# Patient Record
Sex: MALE | Race: WHITE | NOT HISPANIC OR LATINO | URBAN - METROPOLITAN AREA
[De-identification: names, ages, dates, MRNs, and addresses within clinical notes are randomized per-mention and may not be internally consistent; named-entity substitution may affect disease eponyms.]

---

## 2018-07-27 RX ORDER — GLIPIZIDE 5 MG/1
5 TABLET ORAL EVERY MORNING
COMMUNITY

## 2018-07-27 RX ORDER — ASPIRIN 81 MG/1
81 TABLET ORAL DAILY
COMMUNITY

## 2018-07-27 RX ORDER — TORSEMIDE 20 MG/1
20 TABLET ORAL 2 TIMES DAILY
COMMUNITY

## 2018-07-27 RX ORDER — DOXAZOSIN MESYLATE 1 MG/1
1 TABLET ORAL
COMMUNITY

## 2018-07-27 RX ORDER — EPLERENONE 50 MG/1
50 TABLET, FILM COATED ORAL 2 TIMES DAILY
COMMUNITY

## 2018-07-27 RX ORDER — LOSARTAN POTASSIUM 100 MG/1
50 TABLET ORAL DAILY
COMMUNITY

## 2018-07-27 RX ORDER — METOPROLOL TARTRATE 100 MG/1
50 TABLET ORAL 3 TIMES DAILY
COMMUNITY

## 2018-07-27 NOTE — PRE-PROCEDURE INSTRUCTIONS
My Surgical Experience    The following information was developed to assist you to prepare for your operation  What do I need to do before coming to the hospital?   Arrange for a responsible person to drive you to and from the hospital    Arrange care for your children at home  Children are not allowed in the recovery areas of the hospital   Plan to wear clothing that is easy to put on and take off  If you are having shoulder surgery, wear a shirt that buttons or zippers in the front  Bathing  o Shower the evening before and the morning of your surgery with an antibacterial soap  Please refer to the Pre Op Showering Instructions for Surgery Patients Sheet   o Remove nail polish and all body piercing jewelry  o Do not shave any body part for at least 24 hours before surgery-this includes face, arms, legs and upper body  Food  o Nothing to eat or drink after midnight the night before your surgery  This includes candy and chewing gum  o Exception: If your surgery is after 12:00pm (noon), you may have clear liquids such as 7-Up®, ginger ale, apple or cranberry juice, Jell-O®, water, or clear broth until 8:00 am  o Do not drink milk or juice with pulp on the morning before surgery  o Do not drink alcohol 24 hours before surgery  Medicine  o Follow instructions you received from your surgeon about which medicines you may take on the day of surgery  o If instructed to take medicine on the morning of surgery, take pills with just a small sip of water  Call your prescribing doctor for specific infroamtion on what to do if you take insulin    What should I bring to the hospital?    Bring:  Amy Martinze or a walker, if you have them, for foot or knee surgery   A list of the daily medicines, vitamins, minerals, herbals and nutritional supplements you take   Include the dosages of medicines and the time you take them each day   Glasses, dentures or hearing aids   Minimal clothing; you will be wearing hospital sleepwear   Photo ID; required to verify your identity   If you have a Living Will or Power of , bring a copy of the documents   If you have an ostomy, bring an extra pouch and any supplies you use    Do not bring   Medicines or inhalers   Money, valuables or jewelry    What other information should I know about the day of surgery?  Notify your surgeons if you develop a cold, sore throat, cough, fever, rash or any other illness   Report to the Ambulatory Surgical/Same Day Surgery Unit   You will be instructed to stop at Registration only if you have not been pre-registered   Inform your  fi they do not stay that they will be asked by the staff to leave a phone number where they can be reached   Be available to be reached before surgery  In the event the operating room schedule changes, you may be asked to come in earlier or later than expected    *It is important to tell your doctor and others involved in your health care if you are taking or have been taking any non-prescription drugs, vitamins, minerals, herbals or other nutritional supplements  Any of these may interact with some food or medicines and cause a reaction      Pre-Surgery Instructions:   Medication Instructions    aspirin (ECOTRIN LOW STRENGTH) 81 mg EC tablet Instructed patient per Anesthesia Guidelines   doxazosin (CARDURA) 1 mg tablet Instructed patient per Anesthesia Guidelines   eplerenone (INSPRA) 50 MG tablet Instructed patient per Anesthesia Guidelines   glipiZIDE (GLUCOTROL) 5 mg tablet Instructed patient per Anesthesia Guidelines   insulin lispro protamine-insulin lispro (HumaLOG 75/25) 100 units/mL Instructed patient per Anesthesia Guidelines   losartan (COZAAR) 100 MG tablet Instructed patient per Anesthesia Guidelines   metoprolol tartrate (LOPRESSOR) 100 mg tablet Instructed patient per Anesthesia Guidelines   Potassium Chloride (KCL-20 PO) Instructed patient per Anesthesia Guidelines      torsemide (DEMADEX) 20 mg tablet Instructed patient per Anesthesia Guidelines  To take inspra, losartan metoprolol a m   Of surgery

## 2018-07-30 ENCOUNTER — HOSPITAL ENCOUNTER (OUTPATIENT)
Facility: HOSPITAL | Age: 73
Setting detail: OUTPATIENT SURGERY
Discharge: HOME | End: 2018-07-30
Attending: PODIATRIST | Admitting: PODIATRIST
Payer: COMMERCIAL

## 2018-07-30 VITALS
HEART RATE: 54 BPM | BODY MASS INDEX: 35.79 KG/M2 | HEIGHT: 70 IN | RESPIRATION RATE: 18 BRPM | OXYGEN SATURATION: 96 % | TEMPERATURE: 97.8 F | WEIGHT: 250 LBS | SYSTOLIC BLOOD PRESSURE: 134 MMHG | DIASTOLIC BLOOD PRESSURE: 68 MMHG

## 2018-07-30 DIAGNOSIS — D23.72 OTHER BENIGN NEOPLASM OF SKIN OF LEFT LOWER LIMB, INCLUDING HIP: ICD-10-CM

## 2018-07-30 LAB — GLUCOSE SERPL-MCNC: 195 MG/DL (ref 65–140)

## 2018-07-30 PROCEDURE — 88305 TISSUE EXAM BY PATHOLOGIST: CPT | Performed by: PATHOLOGY

## 2018-07-30 PROCEDURE — 82948 REAGENT STRIP/BLOOD GLUCOSE: CPT

## 2018-07-30 NOTE — OP NOTE
OPERATIVE REPORT  PATIENT NAME: Desiree Flowers    :  1945  MRN: [id number]  Pt Location: [facility] OR ROOM 03    SURGERY DATE: 2018    Surgeon(s) and Role:     * Fabiano Feldman DPM - Primary    Preop Diagnosis:  Other benign neoplasm of skin of left lower limb, including hip [D23 72]    Post-Op Diagnosis Codes:     * Other benign neoplasm of skin of left lower limb, including hip [D23 72]    Procedure(s) (LRB):  EXCISIONAL BIOPSY BENIGN NEOPLASM OF SKIN EXTREMITY (Left)    Specimen(s):  ID Type Source Tests Collected by Time Destination   1 : SKIN BIOPSY, LEFT LEG Tissue Soft Tissue, Other TISSUE EXAM Fabiano Feldman DPM 2018 1416        Estimated Blood Loss:   Minimal    Drains:       Anesthesia Type:   Local    Operative Indications: Other benign neoplasm of skin of left lower limb, including hip [D23 72]      Operative Findings:  Patient was brought to the operating room and placed on the operating table in the normal supine position  Laterality and time-out were performed confirming the proper operative site and patient identity  The limb was then scrubbed prepped and draped in usual aseptic manner  No tourniquet was utilized for this procedure  The area was anesthetized utilizing 5 mL of a one-to-one mixture of 1% lidocaine plain and 0 5% Marcaine plain  An elliptical incision was created encompassing the lesion on the left anterior lower leg  The skin lesion was fully excised and passed from the operative field, placed in formalin and sent for pathologic analysis  Hemostasis was achieved utilizing direct compression  The surgical wound was then flushed with copious amounts of sterile saline  Skin edges were reapproximated utilizing 4 0 nylon  The patient tolerated the procedure and anesthesia well and without complication  Sterile dressing was applied to the surgical incision  Patient was transferred to the PACU alert and stable    Complications:   None    Procedure and Technique:     I was present for the entire procedure    Patient Disposition:  PACU     SIGNATURE: Coco Morris DPM  DATE: July 30, 2018  TIME: 2:43 PM

## 2020-12-21 ENCOUNTER — NURSE TRIAGE (OUTPATIENT)
Dept: OTHER | Facility: OTHER | Age: 75
End: 2020-12-21

## 2021-02-19 ENCOUNTER — IMMUNIZATIONS (OUTPATIENT)
Dept: FAMILY MEDICINE CLINIC | Facility: HOSPITAL | Age: 76
End: 2021-02-19

## 2021-02-19 DIAGNOSIS — Z23 ENCOUNTER FOR IMMUNIZATION: Primary | ICD-10-CM

## 2021-02-19 PROCEDURE — 0011A SARS-COV-2 / COVID-19 MRNA VACCINE (MODERNA) 100 MCG: CPT

## 2021-02-19 PROCEDURE — 91301 SARS-COV-2 / COVID-19 MRNA VACCINE (MODERNA) 100 MCG: CPT

## 2021-03-18 ENCOUNTER — IMMUNIZATIONS (OUTPATIENT)
Dept: FAMILY MEDICINE CLINIC | Facility: HOSPITAL | Age: 76
End: 2021-03-18

## 2021-03-18 DIAGNOSIS — Z23 ENCOUNTER FOR IMMUNIZATION: Primary | ICD-10-CM

## 2021-03-18 PROCEDURE — 91301 SARS-COV-2 / COVID-19 MRNA VACCINE (MODERNA) 100 MCG: CPT

## 2021-03-18 PROCEDURE — 0012A SARS-COV-2 / COVID-19 MRNA VACCINE (MODERNA) 100 MCG: CPT

## 2023-06-20 ENCOUNTER — HOSPITAL ENCOUNTER (EMERGENCY)
Facility: HOSPITAL | Age: 78
Discharge: HOME | End: 2023-06-20
Attending: EMERGENCY MEDICINE
Payer: COMMERCIAL

## 2023-06-20 ENCOUNTER — APPOINTMENT (EMERGENCY)
Dept: RADIOLOGY | Facility: HOSPITAL | Age: 78
End: 2023-06-20
Payer: COMMERCIAL

## 2023-06-20 VITALS
DIASTOLIC BLOOD PRESSURE: 86 MMHG | HEIGHT: 70 IN | TEMPERATURE: 97.4 F | OXYGEN SATURATION: 97 % | HEART RATE: 58 BPM | WEIGHT: 250 LBS | SYSTOLIC BLOOD PRESSURE: 179 MMHG | BODY MASS INDEX: 35.79 KG/M2 | RESPIRATION RATE: 21 BRPM

## 2023-06-20 DIAGNOSIS — M17.11 ARTHRITIS OF RIGHT KNEE: Primary | ICD-10-CM

## 2023-06-20 DIAGNOSIS — E87.6 HYPOKALEMIA: ICD-10-CM

## 2023-06-20 LAB
ALBUMIN SERPL BCP-MCNC: 3.8 G/DL (ref 3.5–5)
ALP SERPL-CCNC: 44 U/L (ref 34–104)
ALT SERPL W P-5'-P-CCNC: 19 U/L (ref 7–52)
ANION GAP SERPL CALCULATED.3IONS-SCNC: 11 MMOL/L
AST SERPL W P-5'-P-CCNC: 20 U/L (ref 13–39)
BASOPHILS # BLD AUTO: 0.05 THOUSANDS/ÂΜL (ref 0–0.1)
BASOPHILS NFR BLD AUTO: 1 % (ref 0–1)
BILIRUB SERPL-MCNC: 0.5 MG/DL (ref 0.2–1)
BILIRUB UR QL STRIP: NEGATIVE
BUN SERPL-MCNC: 19 MG/DL (ref 5–25)
CALCIUM SERPL-MCNC: 9.6 MG/DL (ref 8.4–10.2)
CHLORIDE SERPL-SCNC: 102 MMOL/L (ref 96–108)
CLARITY UR: CLEAR
CO2 SERPL-SCNC: 26 MMOL/L (ref 21–32)
COLOR UR: YELLOW
CREAT SERPL-MCNC: 1.51 MG/DL (ref 0.6–1.3)
EOSINOPHIL # BLD AUTO: 0.22 THOUSAND/ÂΜL (ref 0–0.61)
EOSINOPHIL NFR BLD AUTO: 2 % (ref 0–6)
ERYTHROCYTE [DISTWIDTH] IN BLOOD BY AUTOMATED COUNT: 12.7 % (ref 11.6–15.1)
GFR SERPL CREATININE-BSD FRML MDRD: 44 ML/MIN/1.73SQ M
GLUCOSE SERPL-MCNC: 224 MG/DL (ref 65–140)
GLUCOSE SERPL-MCNC: 246 MG/DL (ref 65–140)
GLUCOSE UR STRIP-MCNC: NEGATIVE MG/DL
HCT VFR BLD AUTO: 38.4 % (ref 36.5–49.3)
HGB BLD-MCNC: 12.9 G/DL (ref 12–17)
HGB UR QL STRIP.AUTO: NEGATIVE
IMM GRANULOCYTES # BLD AUTO: 0.02 THOUSAND/UL (ref 0–0.2)
IMM GRANULOCYTES NFR BLD AUTO: 0 % (ref 0–2)
KETONES UR STRIP-MCNC: NEGATIVE MG/DL
LEUKOCYTE ESTERASE UR QL STRIP: NEGATIVE
LYMPHOCYTES # BLD AUTO: 1.91 THOUSANDS/ÂΜL (ref 0.6–4.47)
LYMPHOCYTES NFR BLD AUTO: 19 % (ref 14–44)
MCH RBC QN AUTO: 31.2 PG (ref 26.8–34.3)
MCHC RBC AUTO-ENTMCNC: 33.6 G/DL (ref 31.4–37.4)
MCV RBC AUTO: 93 FL (ref 82–98)
MONOCYTES # BLD AUTO: 1.06 THOUSAND/ÂΜL (ref 0.17–1.22)
MONOCYTES NFR BLD AUTO: 11 % (ref 4–12)
NEUTROPHILS # BLD AUTO: 6.71 THOUSANDS/ÂΜL (ref 1.85–7.62)
NEUTS SEG NFR BLD AUTO: 67 % (ref 43–75)
NITRITE UR QL STRIP: NEGATIVE
NRBC BLD AUTO-RTO: 0 /100 WBCS
PH UR STRIP.AUTO: 6 [PH]
PLATELET # BLD AUTO: 232 THOUSANDS/UL (ref 149–390)
PMV BLD AUTO: 10.2 FL (ref 8.9–12.7)
POTASSIUM SERPL-SCNC: 3.3 MMOL/L (ref 3.5–5.3)
PROT SERPL-MCNC: 6.8 G/DL (ref 6.4–8.4)
PROT UR STRIP-MCNC: NEGATIVE MG/DL
RBC # BLD AUTO: 4.14 MILLION/UL (ref 3.88–5.62)
SODIUM SERPL-SCNC: 139 MMOL/L (ref 135–147)
SP GR UR STRIP.AUTO: 1.01 (ref 1–1.03)
UROBILINOGEN UR QL STRIP.AUTO: 0.2 E.U./DL
WBC # BLD AUTO: 9.97 THOUSAND/UL (ref 4.31–10.16)

## 2023-06-20 PROCEDURE — 81003 URINALYSIS AUTO W/O SCOPE: CPT | Performed by: PHYSICIAN ASSISTANT

## 2023-06-20 PROCEDURE — 93005 ELECTROCARDIOGRAM TRACING: CPT

## 2023-06-20 PROCEDURE — 36415 COLL VENOUS BLD VENIPUNCTURE: CPT | Performed by: PHYSICIAN ASSISTANT

## 2023-06-20 PROCEDURE — 82948 REAGENT STRIP/BLOOD GLUCOSE: CPT

## 2023-06-20 PROCEDURE — 80053 COMPREHEN METABOLIC PANEL: CPT | Performed by: PHYSICIAN ASSISTANT

## 2023-06-20 PROCEDURE — 97167 OT EVAL HIGH COMPLEX 60 MIN: CPT

## 2023-06-20 PROCEDURE — 85025 COMPLETE CBC W/AUTO DIFF WBC: CPT | Performed by: PHYSICIAN ASSISTANT

## 2023-06-20 PROCEDURE — 73564 X-RAY EXAM KNEE 4 OR MORE: CPT

## 2023-06-20 PROCEDURE — 97163 PT EVAL HIGH COMPLEX 45 MIN: CPT

## 2023-06-20 RX ORDER — ACETAMINOPHEN 325 MG/1
975 TABLET ORAL ONCE
Status: COMPLETED | OUTPATIENT
Start: 2023-06-20 | End: 2023-06-20

## 2023-06-20 RX ORDER — POTASSIUM CHLORIDE 20 MEQ/1
20 TABLET, EXTENDED RELEASE ORAL ONCE
Status: COMPLETED | OUTPATIENT
Start: 2023-06-20 | End: 2023-06-20

## 2023-06-20 RX ADMIN — SODIUM CHLORIDE 500 ML: 0.9 INJECTION, SOLUTION INTRAVENOUS at 10:39

## 2023-06-20 RX ADMIN — POTASSIUM CHLORIDE 20 MEQ: 1500 TABLET, EXTENDED RELEASE ORAL at 13:40

## 2023-06-20 RX ADMIN — ACETAMINOPHEN 975 MG: 325 TABLET ORAL at 10:43

## 2023-06-20 NOTE — PHYSICAL THERAPY NOTE
PT EVALUATION  t   06/20/23 1430   PT Last Visit   PT Visit Date 06/20/23   Note Type   Note type Evaluation   Pain Assessment   Pain Assessment Tool 0-10   Pain Score 8   Pain Location/Orientation Orientation: Right;Location: Knee   Hospital Pain Intervention(s) Repositioned; Ambulation/increased activity   Restrictions/Precautions   Weight Bearing Precautions Per Order No   Other Precautions Chair Alarm; Bed Alarm; Fall Risk;Pain;Hard of hearing   Home Living   Type of [address] level;Stairs to enter with rails  (3 ARTURO)   Bathroom Shower/Tub Tub/shower unit   Bathroom Equipment Tub transfer bench   Home Equipment Walker;Cane   Additional Comments Pt was ambulating without AD prior to this week  Has been using a RW for 1 week due to pain in right knee   Prior Function   Level of [place] Independent with ADLs; Independent with functional mobility; Independent with IADLS   Lives With Spouse   Receives Help From Family   IADLs Independent with driving; Independent with meal prep; Independent with medication management   Vocational Retired   General   Additional Pertinent History Pt is a 69year old male seen in ED with wife at bedside    Pt having difficulty going from sit <> Stand due to knee pain   Family/Caregiver Present Yes  (wiife)   Cognition   Overall Cognitive Status WFL   Arousal/Participation Cooperative   Orientation Level Oriented X4   Following Commands Follows all commands and directions without difficulty   Subjective   Subjective Left knee hurts from leaning on it more since right knee hurts so much   RLE Assessment   RLE Assessment WFL  (strength WFLs)   LLE Assessment   LLE Assessment WFL  (strength WFLs,)   Bed Mobility   Supine to Sit 4  Minimal assistance   Additional items Assist x 1   Sit to Supine 5  Supervision   Additional items Verbal cues   Transfers   Sit to Stand 4  Minimal assistance   Additional items Assist x 1;Verbal cues  (from a high stretcher)   Stand to Sit 5 Supervision   Additional items Verbal cues; Bed elevated   Stand pivot 5  Supervision   Ambulation/Elevation   Gait pattern Short stride; Step to   Gait Assistance 5  Supervision   Additional items Assist x 1;Verbal cues   Assistive Device Rolling walker   Distance 15 feet with changes in direction   Stair Management Assistance Not tested   Balance   Static Sitting Good   Dynamic Sitting Good   Static Standing Fair +  (with RW)   Dynamic Standing Fair  (with RW)   Ambulatory Fair  (with RW)   Activity Tolerance   Activity Tolerance Patient limited by pain   Trisha Dickson yes: Krissy   Assessment   Prognosis Good   Problem List Decreased endurance; Impaired balance;Decreased mobility; Impaired hearing;Pain;Obesity   Assessment Patient seen for Physical Therapy evaluation  Patient admitted with <principal problem not specified>  Comorbidities affecting patient's physical performance include:arthritis, DM, CKD HTN  Personal factors affecting patient at time of initial evaluation include: lives in single story house, ambulating with assistive device, stairs to enter home, inability to navigate community distances, hearing impairments and inability to perform IADLS   Prior to admission, patient was independent with functional mobility with rolling walker, requiring assist for ADLS, requiring assist for IADLS, living with wife in a single level home with 3 steps to enter, ambulating household distance, retired and home with family assist   Please find objective findings from Physical Therapy assessment regarding body systems outlined above with impairments and limitations including impaired balance, decreased endurance, gait deviations, pain, decreased activity tolerance and decreased functional mobility tolerance    The Barthel Index was used as a functional outcome tool presenting with a score of Barthel Index Score: 60 today indicating moderate limitations of functional mobility and ADLS  Patient's clinical presentation is currently unstable/unpredictable as seen in patient's presentation of changing level of pain, increased fall risk, new onset of impairment of functional mobility and decreased endurance  Pt would benefit from continued Physical Therapy treatment to address deficits as defined above and maximize level of functional mobility  As demonstrated by objective findings, the assigned level of complexity for this evaluation is high  The patient's AM-PAC Basic Mobility Inpatient Short Form Raw Score is 19  A Raw score of greater than 16 suggests the patient may benefit from discharge to home  Please also refer to the recommendation of the Physical Therapist for safe discharge planning  Goals   Patient Goals to have less pain and walk better   STG Expiration Date 06/27/23   Short Term Goal #1 Indep tranfsers sit <> stand from high surfaces with use of RW   Short Term Goal #2 Indep amb  with RW for functional household distances with fair + balance   LTG Expiration Date 07/04/23   Long Term Goal #1 Indep transfers sit <> stand from all surface heights   Long Term Goal #2 Indep amb  with RW for community distances with no pain in right knee  Indep with HEP   Plan   Treatment/Interventions Functional transfer training; Therapeutic exercise; Endurance training;Patient/family training;Equipment eval/education; Bed mobility;Gait training;Spoke to MD;Spoke to nursing;OT;Family   PT Frequency Other (Comment)  (5/wk)   Recommendation   PT Discharge Recommendation Home with outpatient rehabilitation   Additional Comments   Pt is having difficulty rising from sitting , especially from lower surfaces  Pt was Indep with this until 1 week ago when pain in right knee became too much  Wife is assisting with standing at home and pt is using a RW  Pt does well once on his feet, but is in pain    Pt does not require inpatient rehab , but would benefit from orthopedic consult to address acute pain  Pt would then benefit from OP PT to improve strength and transfers  Pt is able to return home with wife's assistance as needed   Discussed recommendation with DR Kaur who was in agreement  AM-PAC Basic Mobility Inpatient   Turning in Flat Bed Without Bedrails 4   Lying on Back to Sitting on Edge of Flat Bed Without Bedrails 3   Moving Bed to Chair 4   Standing Up From Chair Using Arms 2   Walk in Room 4   Climb 3-5 Stairs With Railing 2   Basic Mobility Inpatient Raw Score 19   Basic Mobility Standardized Score 42 48   Highest Level Of Mobility   -HLM Goal 6: Walk 10 steps or more   -HLM Achieved 6: Walk 10 steps or more   Barthel Index   Feeding 10   Bathing 0   Grooming Score 5   Dressing Score 5   Bladder Score 10   Bowels Score 10   Toilet Use Score 5   Transfers (Bed/Chair) Score 10   Mobility (Level Surface) Score 0   Stairs Score 5   Barthel Index Score 60   End of Consult   Patient Position at End of Consult Supine; All needs within reach   End of Consult Comments wife at [address] [facility] License Number  Timoteo Munoz   [id number]

## 2023-06-20 NOTE — OCCUPATIONAL THERAPY NOTE
"OT EVALUATION       06/20/23 1515   Note Type   Note type Evaluation   Pain Assessment   Pain Assessment Tool 0-10   Pain Score 8   Pain Location/Orientation Orientation: Right;Location: Knee  (with activity)   Restrictions/Precautions   Other Precautions Fall Risk;Pain;Hard of hearing   Home Living   Type of [address] One level  (3 ARTURO)   Bathroom Shower/Tub Tub/shower unit   [employer] Raised   Bathroom Equipment Tub transfer bench;Grab bars in shower; Toilet raiser;Commode   Home Equipment Cane;Walker   Additional Comments Was independent without assistive device prior to 1 week ago  Has been using the walker and assist to stand from spouse for last week   Prior Function   Level of [place] Independent with ADLs; Independent with functional mobility; Needs assistance with IADLS   Lives With Spouse   Receives Help From Family   IADLs Independent with driving   Vocational Retired   General   Family/Caregiver Present Yes  (wife)   Subjective   Subjective \"I have 2 bad shoulders\"   ADL   Eating Assistance 7  Independent   Grooming Assistance 7  Independent   UB Bathing Assistance 5  Supervision/Setup   LB [address] 4  Minimal Assistance   [address] 5  Supervision/Setup    [address] 4  Minimal Assistance   LB Dressing Deficit   (supervision to doff/malik L sock and slip on shoe seated on edge of bed)   Toileting Assistance  4  Minimal Assistance   Bed Mobility   Sit to Supine 5  Supervision   Transfers   Sit to Stand 4  Minimal assistance   Stand to Sit 4  Minimal assistance   Functional Mobility   Functional Mobility 5  Supervision   Additional Comments RW short household distance in room   Balance   Static Sitting Good   Dynamic Sitting Fair +   Static Standing Fair   Dynamic Standing Fair   Activity Tolerance   Activity Tolerance Patient limited by pain   Medical Staff Made Aware yes   RUE Assessment   RUE Assessment WFL  (pt reports 2 bad shoulders with 1 " needing an injection, ROM WFL)   LUE Assessment   LUE Assessment WFL  (pt reports 2 bad shoulders with 1 needing an injection, ROM WFL)   Cognition   Overall Cognitive Status WFL   Arousal/Participation Cooperative   Attention Within functional limits   Orientation Level Oriented X4   Following Commands Follows all commands and directions without difficulty   Assessment   Limitation Decreased ADL status; Decreased Safe judgement during ADL;Decreased endurance;Decreased UE strength;Decreased high-level ADLs; Decreased self-care trans  (decreased balance and mobility)   Prognosis Good   Assessment Patient evaluated by Occupational Therapy  Patient admitted with R knee pain  The patients occupational profile, medical and therapy history includes a extensive additional review of physical, cognitive, or psychosocial history related to current functional performance  Comorbidities affecting functional mobility and ADLS include: arthritis, CKD, diabetes, gout and TKA  Prior to admission, patient was independent with functional mobility without assistive device, independent with ADLS and requiring assist for IADLS  The evaluation identifies the following performance deficits: weakness, impaired balance, decreased endurance, increased fall risk, new onset of impairment of functional mobility, decreased ADLS, decreased IADLS, pain, decreased activity tolerance, decreased safety awareness and decreased strength, that result in activity limitations and/or participation restrictions  This evaluation requires clinical decision making of high complexity, because the patient presents with comorbidites that affect occupational performance and required significant modification of tasks or assistance with consideration of multiple treatment options  The Barthel Index was used as a functional outcome tool presenting with a score of Barthel Index Score: 60, indicating marked limitations of functional mobility and ADLS    The patient's raw score on the AM-PAC Daily Activity Inpatient Short Form is 21  A raw score of greater than or equal to 19 suggests the patient may benefit from discharge to home  Please refer to the recommendation of the Occupational Therapist for safe discharge planning  Patient will benefit from skilled Occupational Therapy services to address above deficits and facilitate a safe return to prior level of function  Goals   Patient Goals less pain and be more mobile   STG Time Frame   (1-7 days)   Short Term Goal  Goals established to promote Patient Goals: less pain and be more mobile:   Grooming: supervision standing at sink; Bathing: supervision; Lower Body Dressing: supervision; Toileting: supervision; Patient will increase ambulatory standard toilet transfer to supervision with rolling walker to increase performance and safety with ADLS and functional mobility; Patient will increase standing tolerance to 4 minutes during ADL task to decrease assistance level and decrease fall risk; Patient will increase bed mobility to supervision in preparation for ADLS and transfers; Patient will increase functional mobility to and from bathroom with rolling walker with supervision to increase performance with ADLS and to use a toilet; Patient will tolerate 5 minutes of UE ROM/strengthening to increase general activity tolerance and performance in ADLS/IADLS; Patient will improve functional activity tolerance to 10 minutes of sustained functional tasks to increase participation in basic self-care and decrease assistance level;  Patient will increase dynamic standing balance to fair+ to improve postural stability and decrease fall risk during standing ADLS and transfers  LTG Time Frame   (8-14 days)   Long Term Goal Grooming: independent standing at sink; Bathing: independent; Lower Body Dressing: independent;  Toileting: independent; Patient will increase ambulatory standard toilet transfer to independent with rolling walker to increase performance and safety with ADLS and functional mobility; Patient will increase standing tolerance to 8 minutes during ADL task to decrease assistance level and decrease fall risk; Patient will increase bed mobility to independent in preparation for ADLS and transfers; Patient will increase functional mobility to and from bathroom with rolling walker with independent to increase performance with ADLS and to use a toilet; Patient will tolerate 10 minutes of UE ROM/strengthening to increase general activity tolerance and performance in ADLS/IADLS; Patient will improve functional activity tolerance to 20 minutes of sustained functional tasks to increase participation in basic self-care and decrease assistance level;  Patient will increase dynamic standing balance to good to improve postural stability and decrease fall risk during standing ADLS and transfers  Pt will score >/= 24/24 on AM-PAC Daily Activity Inpatient scale to promote safe independence with ADLs and functional mobility; Pt will score >/= 90/100 on Barthel Index in order to decrease caregiver assistance needed and increase ability to perform ADLs and functional mobility  Plan   Treatment Interventions ADL retraining;Functional transfer training;UE strengthening/ROM; Endurance training;Patient/family training;Equipment evaluation/education; Activityengagement; Compensatory technique education   Goal Expiration Date 07/04/23   OT Frequency 3-5x/wk   Recommendation   OT Discharge Recommendation Home with outpatient rehabilitation  (outpatient PT)   AM-[facility] Daily Activity Inpatient   Lower Body Dressing 3   Bathing 3   Toileting 3   Upper Body Dressing 4   Grooming 4   Eating 4   Daily Activity Raw Score 21   Daily Activity Standardized Score (Calc for Raw Score >=11) 44 27   AM-[facility] Applied Cognition Inpatient   Following a Speech/Presentation 4   Understanding Ordinary Conversation 4   Taking Medications 4   Remembering Where Things Are Placed or Put Away 4   Remembering List of 4-5 Errands 4   Taking Care of Complicated Tasks 4   Applied Cognition Raw Score 24   Applied Cognition Standardized Score 62 21   Barthel Index   Feeding 10   Bathing 0   Grooming Score 5   Dressing Score 5   Bladder Score 10   Bowels Score 10   Toilet Use Score 5   Transfers (Bed/Chair) Score 10   Mobility (Level Surface) Score 0   Stairs Score 5   Barthel Index Score 61   Licensure   [place] License Number  [facility] [address] 88 OTR/L [id number]

## 2023-06-20 NOTE — ED PROVIDER NOTES
History  Chief Complaint   Patient presents with   • Weakness - Generalized     Patient comes via EMS due to generalized weakness, especially to left leg, for few days, today feels tired and not himself, BS at home higher than normal     Patient is a 55-year-old black male with history of arthritis, chronic kidney disease, diabetes, hypertension who reports bilateral knee pain (R>L) for the past week  He has a history of a left total knee replacement  He reports no fall or trauma  He also reports he has been sleeping a lot for the past week and generally feels weaker  No fever or shaking chills  No chest pain, shortness of, abdominal pain  No urinary symptoms  States he has been using a walker to help himself get up because of the pain in his knees, right greater than left  Prior to Admission Medications   Prescriptions Last Dose Informant Patient Reported? Taking? Potassium Chloride (KCL-20 PO)   Yes No   Sig: Take 10 mEq by mouth daily at bedtime   aspirin (ECOTRIN LOW STRENGTH) 81 mg EC tablet   Yes No   Sig: Take 81 mg by mouth daily   doxazosin (CARDURA) 1 mg tablet   Yes No   Sig: Take 1 mg by mouth daily at bedtime   eplerenone (INSPRA) 50 MG tablet   Yes No   Sig: Take 50 mg by mouth 2 (two) times a day Takes 2 tabs bid   glipiZIDE (GLUCOTROL) 5 mg tablet   Yes No   Sig: Take 5 mg by mouth every morning   insulin lispro protamine-insulin lispro (HumaLOG 75/25) 100 units/mL   Yes No   Sig: Inject under the skin 2 (two) times a day before meals 44 units a m   And 48 units hs   losartan (COZAAR) 100 MG tablet   Yes No   Sig: Take 50 mg by mouth daily   metoprolol tartrate (LOPRESSOR) 100 mg tablet   Yes No   Sig: Take 50 mg by mouth 3 (three) times a day   torsemide (DEMADEX) 20 mg tablet   Yes No   Sig: Take 20 mg by mouth 2 (two) times a day Takes 2 tabs bid      Facility-Administered Medications: None       Past Medical History:   Diagnosis Date   • Arthritis    • Chronic kidney disease    • Diabetes mellitus ([facility] [address] )    • History of wound infection ? 2013    RIGHT LOWER LEG  WAS + FOR STAPH INFECTION AT THAT TIME   • Hypertension    • Shoulder abrasion     right side after a fall in Feb 2018       Past Surgical History:   Procedure Laterality Date   • CHOLECYSTECTOMY     • COLONOSCOPY     • JOINT REPLACEMENT     • KNEE ARTHROPLASTY Left 2014   • TN EXC B9 LESION MRGN XCP SK TG S/N/H/F/G 1 1-2 0CM Left 7/30/2018    Procedure: EXCISIONAL BIOPSY BENIGN NEOPLASM OF SKIN EXTREMITY;  Surgeon: Karel Degroot DPM;  Location: [address] [place] [address];  Service: Podiatry   • STEROID INJECTION SHOULDER Right     8 CERIVAL SPINE INJECTIONS   • TONSILLECTOMY         History reviewed  No pertinent family history  I have reviewed and agree with the history as documented  E-Cigarette/Vaping     E-Cigarette/Vaping Substances     Social History     Tobacco Use   • Smoking status: Every Day     Types: Cigars   • Smokeless tobacco: Never   • Tobacco comments:     3-5 cigars a day   Substance Use Topics   • Alcohol use: Yes     Alcohol/week: 5 0 standard drinks of alcohol     Types: 5 Cans of beer per week     Comment: DAY,SOMETIMES MORE PER PATIENT   • Drug use: No       Review of Systems   Constitutional: Positive for fatigue  Negative for chills and fever  HENT: Negative for ear pain and sore throat  Respiratory: Negative for cough and shortness of breath  Cardiovascular: Negative for chest pain and palpitations  Gastrointestinal: Negative for abdominal pain and vomiting  Genitourinary: Negative for dysuria and hematuria  Musculoskeletal: Positive for arthralgias, back pain and joint swelling  Negative for gait problem and neck pain  Skin: Negative for color change and rash  Neurological: Negative for syncope  All other systems reviewed and are negative  Physical Exam  Physical Exam  Vitals and nursing note reviewed  Constitutional:       General: He is not in acute distress       Appearance: Normal appearance  He is not ill-appearing, toxic-appearing or diaphoretic  HENT:      Head: Normocephalic and atraumatic  Right Ear: Tympanic membrane normal       Left Ear: Tympanic membrane normal       Nose: Nose normal       Mouth/Throat:      Mouth: Mucous membranes are dry  Pharynx: Oropharynx is clear  Eyes:      Extraocular Movements: Extraocular movements intact  Conjunctiva/sclera: Conjunctivae normal       Pupils: Pupils are equal, round, and reactive to light  Cardiovascular:      Rate and Rhythm: Normal rate and regular rhythm  Pulses: Normal pulses  Heart sounds: Normal heart sounds  Pulmonary:      Effort: Pulmonary effort is normal       Breath sounds: Normal breath sounds  Abdominal:      General: Abdomen is flat  Bowel sounds are normal       Palpations: Abdomen is soft  Musculoskeletal:         General: No deformity or signs of injury  Cervical back: Normal range of motion and neck supple  Comments: Left knee surgical scar  Pain in both knees with range of motion  Neither knee is erythematous or warm   Skin:     General: Skin is warm and dry  Capillary Refill: Capillary refill takes less than 2 seconds  Neurological:      General: No focal deficit present  Mental Status: He is alert and oriented to person, place, and time  Mental status is at baseline           Vital Signs  ED Triage Vitals   Temperature Pulse Respirations Blood Pressure SpO2   06/20/23 1011 06/20/23 1011 06/20/23 1011 06/20/23 1011 06/20/23 1011   (!) 97 4 °F (36 3 °C) 63 20 (!) 183/82 96 %      Temp Source Heart Rate Source Patient Position - Orthostatic VS BP Location FiO2 (%)   06/20/23 1011 06/20/23 1011 06/20/23 1011 06/20/23 1011 --   Tympanic Monitor Lying Left arm       Pain Score       06/20/23 1043       6           Vitals:    06/20/23 1237 06/20/23 1315 06/20/23 1334 06/20/23 1345   BP: (!) 172/77 (S) (!) 187/107 (!) 179/86 (!) 179/86   Pulse: 56 60  58   Patient Position - Orthostatic VS: Lying            Visual Acuity      ED Medications  Medications   sodium chloride 0 9 % bolus 500 mL (0 mL Intravenous Stopped 6/20/23 1236)   acetaminophen (TYLENOL) tablet 975 mg (975 mg Oral Given 6/20/23 1043)   potassium chloride (K-DUR,KLOR-CON) CR tablet 20 mEq (20 mEq Oral Given 6/20/23 1340)       Diagnostic Studies  Results Reviewed     Procedure Component Value Units Date/Time    UA w Reflex to Microscopic w Reflex to Culture [[id number]] Collected: 06/20/23 1236    Lab Status: Final result Specimen: Urine, Clean Catch Updated: 06/20/23 1243     Color, UA Yellow     Clarity, UA Clear     Specific Gravity, UA 1 015     pH, UA 6 0     Leukocytes, UA Negative     Nitrite, UA Negative     Protein, UA Negative mg/dl      Glucose, UA Negative mg/dl      Ketones, UA Negative mg/dl      Urobilinogen, UA 0 2 E U /dl      Bilirubin, UA Negative     Occult Blood, UA Negative    Comprehensive metabolic panel [[id number]]  (Abnormal) Collected: 06/20/23 1039    Lab Status: Final result Specimen: Blood from Arm, Right Updated: 06/20/23 1107     Sodium 139 mmol/L      Potassium 3 3 mmol/L      Chloride 102 mmol/L      CO2 26 mmol/L      ANION GAP 11 mmol/L      BUN 19 mg/dL      Creatinine 1 51 mg/dL      Glucose 224 mg/dL      Calcium 9 6 mg/dL      AST 20 U/L      ALT 19 U/L      Alkaline Phosphatase 44 U/L      Total Protein 6 8 g/dL      Albumin 3 8 g/dL      Total Bilirubin 0 50 mg/dL      eGFR 44 ml/min/1 73sq m     Narrative:      [place] guidelines for Chronic Kidney Disease (CKD):   •  Stage 1 with normal or high GFR (GFR > 90 mL/min/1 73 square meters)  •  Stage 2 Mild CKD (GFR = 60-89 mL/min/1 73 square meters)  •  Stage 3A Moderate CKD (GFR = 45-59 mL/min/1 73 square meters)  •  Stage 3B Moderate CKD (GFR = 30-44 mL/min/1 73 square meters)  •  Stage 4 Severe CKD (GFR = 15-29 mL/min/1 73 square meters)  •  Stage 5 End Stage CKD (GFR <15 mL/min/1 73 square meters)  Note: GFR calculation is accurate only with a steady state creatinine    CBC and differential [[id number]] Collected: 06/20/23 1039    Lab Status: Final result Specimen: Blood from Arm, Right Updated: 06/20/23 1049     WBC 9 97 Thousand/uL      RBC 4 14 Million/uL      Hemoglobin 12 9 g/dL      Hematocrit 38 4 %      MCV 93 fL      MCH 31 2 pg      MCHC 33 6 g/dL      RDW 12 7 %      MPV 10 2 fL      Platelets 982 Thousands/uL      nRBC 0 /100 WBCs      Neutrophils Relative 67 %      Immat GRANS % 0 %      Lymphocytes Relative 19 %      Monocytes Relative 11 %      Eosinophils Relative 2 %      Basophils Relative 1 %      Neutrophils Absolute 6 71 Thousands/µL      Immature Grans Absolute 0 02 Thousand/uL      Lymphocytes Absolute 1 91 Thousands/µL      Monocytes Absolute 1 06 Thousand/µL      Eosinophils Absolute 0 22 Thousand/µL      Basophils Absolute 0 05 Thousands/µL     Fingerstick Glucose (POCT) [[id number]]  (Abnormal) Collected: 06/20/23 1012    Lab Status: Final result Updated: 06/20/23 1017     POC Glucose 246 mg/dl                  XR knee 4+ views left injury   Final Result by Angelo Gayle MD (06/20 1401)      Unremarkable appearance of total knee arthroplasty  Workstation performed: [id number]         XR knee 4+ views Right injury   Final Result by Angelo Gayle MD (06/20 1401)      No acute osseous abnormality  Degenerative changes as described              Workstation performed: [id number]                    Procedures  ECG 12 Lead Documentation Only    Date/Time: 6/20/2023 11:55 AM    Performed by: Taylor Vazquez PA-C  Authorized by: Taylor Vazquez PA-C    ECG reviewed by me, the ED Provider: yes    Patient location:  ED  Rate:     ECG rate:  61    ECG rate assessment: normal    Rhythm:     Rhythm: sinus rhythm    QRS:     QRS axis:  Left  Conduction:     Conduction: abnormal      Abnormal conduction: complete RBBB               ED Course SBIRT 22yo+    Flowsheet Row Most Recent Value   Initial Alcohol Screen: US AUDIT-C     1  How often do you have a drink containing alcohol? 0 Filed at: 06/20/2023 1013   2  How many drinks containing alcohol do you have on a typical day you are drinking? 0 Filed at: 06/20/2023 1013   3a  Male UNDER 65: How often do you have five or more drinks on one occasion? 0 Filed at: 06/20/2023 1013   3b  FEMALE Any Age, or MALE 65+: How often do you have 4 or more drinks on one occassion? 0 Filed at: 06/20/2023 1013   Audit-C Score 0 Filed at: 06/20/2023 1013   MOISES: How many times in the past year have you    Used an illegal drug or used a prescription medication for non-medical reasons? Never Filed at: 06/20/2023 1013                    Medical Decision Making  Labs were reviewed  I ordered x-rays of both knees  Right knee x-ray interpreted as advanced DJD  Left knee prosthetic appears intact  Patient was given Tylenol for pain  Patient was evaluated by PT and OT in the ED  Patient was taught maneuvers to assist in getting up and was able to ambulate with a walker in the exam room  Patient was advised follow-up with orthopedist this week regarding his knee pain  He has walker at home  Amount and/or Complexity of Data Reviewed  Labs: ordered  Radiology: ordered  Risk  OTC drugs  Prescription drug management  Disposition  Final diagnoses:   Arthritis of right knee   Hypokalemia     Time reflects when diagnosis was documented in both MDM as applicable and the Disposition within this note     Time User Action Codes Description Comment    6/20/2023  2:52 PM Ozella Fragmin Add [G51 42] Arthritis of right knee     6/20/2023  2:52 PM Ozella Fragmin Add [E87 6] Hypokalemia       ED Disposition     ED Disposition   Discharge    Condition   Stable    Date/Time   Tue Jun 20, 2023  2:52 PM    [address] discharge to home/self care  Follow-up Information     Follow up With Specialties Details Why Contact Info Additional [address] [facility]   [address] [address] [phone] [address],  [address] [facility], [place], [place], [address] [phone]          Patient's Medications   Discharge Prescriptions    No medications on file       No discharge procedures on file      PDMP Review     None          ED Provider  Electronically Signed by           Valentina Oliva PA-C  06/20/23 [address] [place], [place] [address]LAYLA  06/20/23 6512

## 2023-06-20 NOTE — DISCHARGE INSTRUCTIONS
Intermittent cold packs and elevation    Tylenol may be taken 975 mg every 6 hours as needed for pain     Follow up with your orthopedist out of [place] campus or with [address] [place] [address] for further evaluation   Call for appointment    Use walker to assist ambulation    Return to ED for increased pain, worsening symptoms

## 2023-06-23 LAB
ATRIAL RATE: 61 BPM
P AXIS: 88 DEGREES
PR INTERVAL: 190 MS
QRS AXIS: -53 DEGREES
QRSD INTERVAL: 130 MS
QT INTERVAL: 448 MS
QTC INTERVAL: 450 MS
T WAVE AXIS: 14 DEGREES
VENTRICULAR RATE: 61 BPM

## 2023-06-23 PROCEDURE — 93010 ELECTROCARDIOGRAM REPORT: CPT | Performed by: INTERNAL MEDICINE

## 2023-06-27 ENCOUNTER — OFFICE VISIT (OUTPATIENT)
Dept: OBGYN CLINIC | Facility: CLINIC | Age: 78
End: 2023-06-27
Payer: COMMERCIAL

## 2023-06-27 VITALS
SYSTOLIC BLOOD PRESSURE: 147 MMHG | BODY MASS INDEX: 34.44 KG/M2 | WEIGHT: 240 LBS | HEART RATE: 75 BPM | TEMPERATURE: 98.2 F | DIASTOLIC BLOOD PRESSURE: 75 MMHG

## 2023-06-27 DIAGNOSIS — M17.11 PRIMARY OSTEOARTHRITIS OF RIGHT KNEE: Primary | ICD-10-CM

## 2023-06-27 PROCEDURE — 99203 OFFICE O/P NEW LOW 30 MIN: CPT | Performed by: PHYSICIAN ASSISTANT

## 2023-06-27 NOTE — PROGRESS NOTES
Assessment/Plan:  1  Primary osteoarthritis of right knee  Injection Procedure Prior Authorization        Patient does appear to have significant arthritis of the right knee and appears to have an arthritic flare at this point  He did receive a steroid injection by his primary care doctor last Thursday  I explained that these can take 2 weeks to fully take effect  It is not even been a week since his injection  We did discuss viscosupplementation injections should the steroid injection not help him over the next week and a half  I will order these today, however I do not want to start the series if he has improvement in his knee pain over the next week and a half  I did advise ice and over-the-counter medications as needed for discomfort  He will follow-up 2 Fridays from now if he fails to make progress to start viscosupplementation injections  Subjective:   Sirisha Fernandez is a 69 y o  male who presents today for evaluation of his right knee  Patient notes this started to bother him a couple weeks ago, with no inciting event prior to the onset of his symptoms  Patient does have a history of left total knee arthroplasty back in 2014  He notes he did have some issues with the right knee about 10 years ago  He did have viscosupplementation injections at that time and had responded well  He has not had many issues since  He notes diffuse pain about the knee, which is worse with activity and better with rest   He initially could not ambulate well, but over the last few days has been ambulating better with his walker  He did have an x-ray of the knee on 6/20/2023 which showed significant tricompartmental arthritis  I am able to view these images today  The patient did see his primary care doctor this past Thursday and received a steroid injection  He notes this has helped some  He still notes swelling and pain about the knee though  Review of Systems   Constitutional: Negative    Negative for chills and fever  HENT: Negative  Negative for ear pain and sore throat  Eyes: Negative  Negative for pain and redness  Respiratory: Negative  Negative for shortness of breath and wheezing  Cardiovascular: Negative for chest pain and palpitations  Gastrointestinal: Negative  Negative for abdominal pain and blood in stool  Endocrine: Negative  Negative for polydipsia and polyuria  Genitourinary: Negative  Negative for difficulty urinating and dysuria  Musculoskeletal:        As noted in HPI   Skin: Negative  Negative for pallor and rash  Neurological: Negative  Negative for dizziness and numbness  Hematological: Negative  Negative for adenopathy  Does not bruise/bleed easily  Psychiatric/Behavioral: Negative  Negative for confusion and suicidal ideas  Past Medical History:   Diagnosis Date   • Arthritis    • Chronic kidney disease    • Diabetes mellitus ([facility] [address] )    • History of wound infection ? 2013    RIGHT LOWER LEG  WAS + FOR STAPH INFECTION AT THAT TIME   • Hypertension    • Shoulder abrasion     right side after a fall in Feb 2018       Past Surgical History:   Procedure Laterality Date   • CHOLECYSTECTOMY     • COLONOSCOPY     • JOINT REPLACEMENT     • KNEE ARTHROPLASTY Left 2014   • NE EXC B9 LESION MRGN XCP SK TG S/N/H/F/G 1 1-2 0CM Left 7/30/2018    Procedure: EXCISIONAL BIOPSY BENIGN NEOPLASM OF SKIN EXTREMITY;  Surgeon: Noé Brown DPM;  Location: [address] [place] [address];  Service: Podiatry   • STEROID INJECTION SHOULDER Right     8 CERIVAL SPINE INJECTIONS   • TONSILLECTOMY         History reviewed  No pertinent family history  Social History     Occupational History   • Not on file   Tobacco Use   • Smoking status: Every Day     Types: Cigars   • Smokeless tobacco: Never   • Tobacco comments:     3-5 cigars a day   Substance and Sexual Activity   • Alcohol use:  Yes     Alcohol/week: 5 0 standard drinks of alcohol     Types: 5 Cans of beer per week     Comment: DAY,SOMETIMES MORE PER PATIENT   • Drug use: No   • Sexual activity: Not on file         Current Outpatient Medications:   •  aspirin (ECOTRIN LOW STRENGTH) 81 mg EC tablet, Take 81 mg by mouth daily, Disp: , Rfl:   •  doxazosin (CARDURA) 1 mg tablet, Take 1 mg by mouth daily at bedtime, Disp: , Rfl:   •  eplerenone (INSPRA) 50 MG tablet, Take 50 mg by mouth 2 (two) times a day Takes 2 tabs bid, Disp: , Rfl:   •  glipiZIDE (GLUCOTROL) 5 mg tablet, Take 5 mg by mouth every morning, Disp: , Rfl:   •  insulin lispro protamine-insulin lispro (HumaLOG 75/25) 100 units/mL, Inject under the skin 2 (two) times a day before meals 44 units a m  And 48 units hs, Disp: , Rfl:   •  losartan (COZAAR) 100 MG tablet, Take 50 mg by mouth daily, Disp: , Rfl:   •  metoprolol tartrate (LOPRESSOR) 100 mg tablet, Take 50 mg by mouth 3 (three) times a day, Disp: , Rfl:   •  torsemide (DEMADEX) 20 mg tablet, Take 20 mg by mouth 2 (two) times a day Takes 2 tabs bid, Disp: , Rfl:   •  Potassium Chloride (KCL-20 PO), Take 10 mEq by mouth daily at bedtime (Patient not taking: Reported on 6/27/2023), Disp: , Rfl:     Allergies   Allergen Reactions   • Ibuprofen      To avoid due to kidney problems       Objective:  Vitals:    06/27/23 0951   BP: 147/75   Pulse: 75   Temp: 98 2 °F (36 8 °C)     Pain Score:   9      Right Knee Exam     Tenderness   The patient is experiencing tenderness in the medial joint line and lateral joint line  Range of Motion   Extension: -5   Flexion: 100     Tests   Varus: negative Valgus: negative  Drawer:  Anterior - negative    Posterior - negative    Other   Erythema: absent  Sensation: normal  Pulse: present  Effusion: effusion (1+) present          Observations     Right Knee   Positive for effusion (1+)  Physical Exam  Musculoskeletal:      Right knee: Effusion (1+) present           I have personally reviewed pertinent films in PACS and my interpretation is as follows:  X-rays right knee from 6/20/2023: Significant tricompartmental arthritis  This document was created using speech voice recognition software  Grammatical errors, random word insertions, pronoun errors, and incomplete sentences are an occasional consequence of this system due to software limitations, ambient noise, and hardware issues  Any formal questions or concerns about content, text, or information contained within the body of this dictation should be directly addressed to the provider for clarification

## 2023-07-06 ENCOUNTER — TELEPHONE (OUTPATIENT)
Dept: OBGYN CLINIC | Facility: CLINIC | Age: 78
End: 2023-07-06

## 2023-07-06 NOTE — TELEPHONE ENCOUNTER
Patient is scheduled with you on 07/10/23, had CSI with PCP is looking for visco. Should I order the visco and cancel appointment, he will not be able to get it that day needs authorization. Would you like to document first and then order at appointment.

## 2023-07-10 ENCOUNTER — OFFICE VISIT (OUTPATIENT)
Dept: OBGYN CLINIC | Facility: CLINIC | Age: 78
End: 2023-07-10
Payer: COMMERCIAL

## 2023-07-10 VITALS
HEART RATE: 58 BPM | BODY MASS INDEX: 34.36 KG/M2 | DIASTOLIC BLOOD PRESSURE: 82 MMHG | WEIGHT: 240 LBS | HEIGHT: 70 IN | SYSTOLIC BLOOD PRESSURE: 164 MMHG

## 2023-07-10 DIAGNOSIS — M17.11 PRIMARY OSTEOARTHRITIS OF RIGHT KNEE: Primary | ICD-10-CM

## 2023-07-10 PROCEDURE — 99213 OFFICE O/P EST LOW 20 MIN: CPT | Performed by: PHYSICIAN ASSISTANT

## 2023-07-10 NOTE — PROGRESS NOTES
Assessment/Plan:  1. Primary osteoarthritis of right knee  Injection Procedure Prior Authorization          The patient does have significant arthritis about the right knee. As he has responded well to viscosupplementation injections in the past, I did order these again for him today. I advised ice and over-the-counter medications as needed for discomfort. We will start the series as soon as they are approved by insurance. Subjective:   Kalyn Christie is a 69 y.o. male who presents today for evaluation of his right knee. The patient does have a history of left total knee arthroplasty. He notes the right knee has bothered him for quite some time, but has been getting progressively worse. He did have a steroid injection by his primary care doctor a little over 2 weeks ago, which  did provide him some relief as he is now able to ambulate. He notes prior to this injection he was not able to ambulate well at all. He is using a walker to help with ambulation. He still notes ongoing pain diffusely about the knee though, which is worse with activity and better with rest.  He is here to discuss possible viscosupplementation injections. He notes he had these about 10 years ago and they did help him significantly. Review of Systems   Constitutional: Negative. Negative for chills and fever. HENT: Negative. Negative for ear pain and sore throat. Eyes: Negative. Negative for pain and redness. Respiratory: Negative. Negative for shortness of breath and wheezing. Cardiovascular: Negative for chest pain and palpitations. Gastrointestinal: Negative. Negative for abdominal pain and blood in stool. Endocrine: Negative. Negative for polydipsia and polyuria. Genitourinary: Negative. Negative for difficulty urinating and dysuria. Musculoskeletal:        As noted in HPI   Skin: Negative. Negative for pallor and rash. Neurological: Negative. Negative for dizziness and numbness.    Hematological: Negative. Negative for adenopathy. Does not bruise/bleed easily. Psychiatric/Behavioral: Negative. Negative for confusion and suicidal ideas. Past Medical History:   Diagnosis Date   • Arthritis    • Chronic kidney disease    • Diabetes mellitus ([address])    • History of wound infection ? 2013    RIGHT LOWER LEG. WAS + FOR STAPH INFECTION AT THAT TIME   • Hypertension    • Shoulder abrasion     right side after a fall in Feb 2018       Past Surgical History:   Procedure Laterality Date   • CHOLECYSTECTOMY     • COLONOSCOPY     • JOINT REPLACEMENT     • KNEE ARTHROPLASTY Left 2014   • [place] EXC B9 LESION MRGN XCP SK TG S/N/H/F/G 1.1-2.0CM Left 7/30/2018    Procedure: EXCISIONAL BIOPSY BENIGN NEOPLASM OF SKIN EXTREMITY;  Surgeon: Rajani Gracia DPM;  Location: [facility];  Service: Podiatry   • STEROID INJECTION SHOULDER Right     8 CERIVAL SPINE INJECTIONS   • TONSILLECTOMY         History reviewed. No pertinent family history. Social History     Occupational History   • Not on file   Tobacco Use   • Smoking status: Every Day     Types: Cigars   • Smokeless tobacco: Never   • Tobacco comments:     3-5 cigars a day   Substance and Sexual Activity   • Alcohol use: Yes     Alcohol/week: 5.0 standard drinks of alcohol     Types: 5 Cans of beer per week     Comment: DAY,SOMETIMES MORE PER PATIENT   • Drug use: No   • Sexual activity: Not on file         Current Outpatient Medications:   •  aspirin (ECOTRIN LOW STRENGTH) 81 mg EC tablet, Take 81 mg by mouth daily, Disp: , Rfl:   •  doxazosin (CARDURA) 1 mg tablet, Take 1 mg by mouth daily at bedtime, Disp: , Rfl:   •  eplerenone (INSPRA) 50 MG tablet, Take 50 mg by mouth 2 (two) times a day Takes 2 tabs bid, Disp: , Rfl:   •  glipiZIDE (GLUCOTROL) 5 mg tablet, Take 5 mg by mouth every morning, Disp: , Rfl:   •  insulin lispro protamine-insulin lispro (HumaLOG 75/25) 100 units/mL, Inject under the skin 2 (two) times a day before meals 44 units a.m.  And 48 units hs, Disp: , Rfl:   •  losartan (COZAAR) 100 MG tablet, Take 50 mg by mouth daily, Disp: , Rfl:   •  metoprolol tartrate (LOPRESSOR) 100 mg tablet, Take 50 mg by mouth 3 (three) times a day, Disp: , Rfl:   •  torsemide (DEMADEX) 20 mg tablet, Take 20 mg by mouth 2 (two) times a day Takes 2 tabs bid, Disp: , Rfl:   •  Potassium Chloride (KCL-20 PO), Take 10 mEq by mouth daily at bedtime (Patient not taking: Reported on 6/27/2023), Disp: , Rfl:     Allergies   Allergen Reactions   • Ibuprofen      To avoid due to kidney problems       Objective:  Vitals:    07/10/23 1126   BP: 164/82   Pulse: 58     Pain Score:   6      Right Knee Exam     Tenderness   The patient is experiencing tenderness in the medial joint line. Range of Motion   Extension: -10   Flexion: 90     Tests   Varus: negative Valgus: negative  Drawer:  Anterior - negative    Posterior - negative    Other   Erythema: absent  Sensation: normal  Pulse: present  Swelling: none  Effusion: no effusion present          Observations     Right Knee   Negative for effusion. Physical Exam  Constitutional:       General: He is not in acute distress. Appearance: He is well-developed. HENT:      Head: Normocephalic and atraumatic. Eyes:      General: No scleral icterus. Conjunctiva/sclera: Conjunctivae normal.   Neck:      Vascular: No JVD. Cardiovascular:      Rate and Rhythm: Normal rate. Pulmonary:      Effort: Pulmonary effort is normal. No respiratory distress. Musculoskeletal:      Right knee: No effusion. Skin:     General: Skin is warm. Neurological:      Mental Status: He is alert and oriented to person, place, and time. Coordination: Coordination normal.         I have personally reviewed pertinent films in PACS and my interpretation is as follows:  Xrays right knee from 6/20/23: Tricompartmental arthritis. This document was created using speech voice recognition software.    Grammatical errors, random word insertions, pronoun errors, and incomplete sentences are an occasional consequence of this system due to software limitations, ambient noise, and hardware issues. Any formal questions or concerns about content, text, or information contained within the body of this dictation should be directly addressed to the provider for clarification.

## 2023-07-17 ENCOUNTER — PROCEDURE VISIT (OUTPATIENT)
Dept: OBGYN CLINIC | Facility: CLINIC | Age: 78
End: 2023-07-17
Payer: COMMERCIAL

## 2023-07-17 VITALS
SYSTOLIC BLOOD PRESSURE: 168 MMHG | DIASTOLIC BLOOD PRESSURE: 77 MMHG | HEART RATE: 62 BPM | BODY MASS INDEX: 34.36 KG/M2 | HEIGHT: 70 IN | WEIGHT: 240 LBS

## 2023-07-17 DIAGNOSIS — M17.11 PRIMARY OSTEOARTHRITIS OF RIGHT KNEE: Primary | ICD-10-CM

## 2023-07-17 PROCEDURE — 20610 DRAIN/INJ JOINT/BURSA W/O US: CPT | Performed by: PHYSICIAN ASSISTANT

## 2023-07-17 RX ORDER — HYALURONATE SODIUM 10 MG/ML
20 SYRINGE (ML) INTRAARTICULAR
Status: COMPLETED | OUTPATIENT
Start: 2023-07-17 | End: 2023-07-17

## 2023-07-17 RX ADMIN — Medication 20 MG: at 15:00

## 2023-07-17 NOTE — PROGRESS NOTES
Assessment/Plan:  1. Primary osteoarthritis of right knee          Follow-up 1 week. Subjective:   Damon Szymanski is a 69 y.o. male who presents today for euflexxa #1 right knee. Review of Systems      Past Medical History:   Diagnosis Date   • Arthritis    • Chronic kidney disease    • Diabetes mellitus ([address])    • History of wound infection ? 2013    RIGHT LOWER LEG. WAS + FOR STAPH INFECTION AT THAT TIME   • Hypertension    • Shoulder abrasion     right side after a fall in Feb 2018       Past Surgical History:   Procedure Laterality Date   • CHOLECYSTECTOMY     • COLONOSCOPY     • JOINT REPLACEMENT     • KNEE ARTHROPLASTY Left 2014   • [place] EXC B9 LESION MRGN XCP SK TG S/N/H/F/G 1.1-2.0CM Left 7/30/2018    Procedure: EXCISIONAL BIOPSY BENIGN NEOPLASM OF SKIN EXTREMITY;  Surgeon: Melody Kelley DPM;  Location: [facility] (formerly [facility]);  Service: Podiatry   • STEROID INJECTION SHOULDER Right     8 CERIVAL SPINE INJECTIONS   • TONSILLECTOMY         History reviewed. No pertinent family history. Social History     Occupational History   • Not on file   Tobacco Use   • Smoking status: Every Day     Types: Cigars   • Smokeless tobacco: Never   • Tobacco comments:     3-5 cigars a day   Substance and Sexual Activity   • Alcohol use:  Yes     Alcohol/week: 5.0 standard drinks of alcohol     Types: 5 Cans of beer per week     Comment: DAY,SOMETIMES MORE PER PATIENT   • Drug use: No   • Sexual activity: Not on file         Current Outpatient Medications:   •  aspirin (ECOTRIN LOW STRENGTH) 81 mg EC tablet, Take 81 mg by mouth daily, Disp: , Rfl:   •  doxazosin (CARDURA) 1 mg tablet, Take 1 mg by mouth daily at bedtime, Disp: , Rfl:   •  eplerenone (INSPRA) 50 MG tablet, Take 50 mg by mouth 2 (two) times a day Takes 2 tabs bid, Disp: , Rfl:   •  glipiZIDE (GLUCOTROL) 5 mg tablet, Take 5 mg by mouth every morning, Disp: , Rfl:   •  insulin lispro protamine-insulin lispro (HumaLOG 75/25) 100 units/mL, Inject under the skin 2 (two) times a day before meals 44 units a.m. And 48 units hs, Disp: , Rfl:   •  losartan (COZAAR) 100 MG tablet, Take 50 mg by mouth daily, Disp: , Rfl:   •  metoprolol tartrate (LOPRESSOR) 100 mg tablet, Take 50 mg by mouth 3 (three) times a day, Disp: , Rfl:   •  Potassium Chloride (KCL-20 PO), Take 10 mEq by mouth daily at bedtime (Patient not taking: Reported on 6/27/2023), Disp: , Rfl:   •  torsemide (DEMADEX) 20 mg tablet, Take 20 mg by mouth 2 (two) times a day Takes 2 tabs bid, Disp: , Rfl:     Allergies   Allergen Reactions   • Ibuprofen      To avoid due to kidney problems       Objective:  Vitals:    07/17/23 1442   BP: 168/77   Pulse: 62            Ortho Exam    Physical Exam    Large joint arthrocentesis: R knee  Universal Protocol:  Risks and benefits: risks, benefits and alternatives were discussed  Consent given by: patient  Timeout called at: 7/17/2023 3:03 PM.  Site marked: the operative site was marked  Supporting Documentation  Indications: pain   Procedure Details  Location: knee - R knee  Preparation: Patient was prepped and draped in the usual sterile fashion (Alcohol prep)  Needle size: 22 G  Ultrasound guidance: no  Approach: anterolateral  Medications administered: 20 mg Sodium Hyaluronate (Viscosup) 20 MG/2ML    Patient tolerance: patient tolerated the procedure well with no immediate complications  Dressing:  Sterile dressing applied              This document was created using speech voice recognition software. Grammatical errors, random word insertions, pronoun errors, and incomplete sentences are an occasional consequence of this system due to software limitations, ambient noise, and hardware issues. Any formal questions or concerns about content, text, or information contained within the body of this dictation should be directly addressed to the provider for clarification.

## 2023-07-24 ENCOUNTER — PROCEDURE VISIT (OUTPATIENT)
Dept: OBGYN CLINIC | Facility: CLINIC | Age: 78
End: 2023-07-24
Payer: COMMERCIAL

## 2023-07-24 VITALS — HEART RATE: 59 BPM | DIASTOLIC BLOOD PRESSURE: 66 MMHG | SYSTOLIC BLOOD PRESSURE: 136 MMHG

## 2023-07-24 DIAGNOSIS — M17.11 PRIMARY OSTEOARTHRITIS OF RIGHT KNEE: Primary | ICD-10-CM

## 2023-07-24 PROCEDURE — 20610 DRAIN/INJ JOINT/BURSA W/O US: CPT | Performed by: PHYSICIAN ASSISTANT

## 2023-07-24 NOTE — PROGRESS NOTES
Assessment/Plan:  1. Primary osteoarthritis of right knee          Follow-up 1 week. Subjective:   America Zelaya is a 69 y.o. male who presents today for orthovisc  #2  right knee. Review of Systems      Past Medical History:   Diagnosis Date   • Arthritis    • Chronic kidney disease    • Diabetes mellitus ([address])    • History of wound infection ? 2013    RIGHT LOWER LEG. WAS + FOR STAPH INFECTION AT THAT TIME   • Hypertension    • Shoulder abrasion     right side after a fall in Feb 2018       Past Surgical History:   Procedure Laterality Date   • CHOLECYSTECTOMY     • COLONOSCOPY     • JOINT REPLACEMENT     • KNEE ARTHROPLASTY Left 2014   • [place] EXC B9 LESION MRGN XCP SK TG S/N/H/F/G 1.1-2.0CM Left 7/30/2018    Procedure: EXCISIONAL BIOPSY BENIGN NEOPLASM OF SKIN EXTREMITY;  Surgeon: Dave Valadez DPM;  Location: [facility];  Service: Podiatry   • STEROID INJECTION SHOULDER Right     8 CERIVAL SPINE INJECTIONS   • TONSILLECTOMY         No family history on file. Social History     Occupational History   • Not on file   Tobacco Use   • Smoking status: Every Day     Types: Cigars   • Smokeless tobacco: Never   • Tobacco comments:     3-5 cigars a day   Substance and Sexual Activity   • Alcohol use:  Yes     Alcohol/week: 5.0 standard drinks of alcohol     Types: 5 Cans of beer per week     Comment: DAY,SOMETIMES MORE PER PATIENT   • Drug use: No   • Sexual activity: Not on file         Current Outpatient Medications:   •  aspirin (ECOTRIN LOW STRENGTH) 81 mg EC tablet, Take 81 mg by mouth daily, Disp: , Rfl:   •  doxazosin (CARDURA) 1 mg tablet, Take 1 mg by mouth daily at bedtime, Disp: , Rfl:   •  eplerenone (INSPRA) 50 MG tablet, Take 50 mg by mouth 2 (two) times a day Takes 2 tabs bid, Disp: , Rfl:   •  glipiZIDE (GLUCOTROL) 5 mg tablet, Take 5 mg by mouth every morning, Disp: , Rfl:   •  insulin lispro protamine-insulin lispro (HumaLOG 75/25) 100 units/mL, Inject under the skin 2 (two) times a day before meals 44 units a.m. And 48 units hs, Disp: , Rfl:   •  losartan (COZAAR) 100 MG tablet, Take 50 mg by mouth daily, Disp: , Rfl:   •  metoprolol tartrate (LOPRESSOR) 100 mg tablet, Take 50 mg by mouth 3 (three) times a day, Disp: , Rfl:   •  Potassium Chloride (KCL-20 PO), Take 10 mEq by mouth daily at bedtime (Patient not taking: Reported on 6/27/2023), Disp: , Rfl:   •  torsemide (DEMADEX) 20 mg tablet, Take 20 mg by mouth 2 (two) times a day Takes 2 tabs bid, Disp: , Rfl:     Allergies   Allergen Reactions   • Ibuprofen      To avoid due to kidney problems       Objective:  Vitals:    07/24/23 1042   BP: 136/66   Pulse: 59            Ortho Exam    Physical Exam    Large joint arthrocentesis: R knee  Universal Protocol:  Risks and benefits: risks, benefits and alternatives were discussed  Consent given by: patient  Timeout called at: 7/24/2023 10:47 AM.  Site marked: the operative site was marked  Supporting Documentation  Indications: pain   Procedure Details  Location: knee - R knee  Preparation: Patient was prepped and draped in the usual sterile fashion (Alcohol prep)  Needle size: 22 G  Ultrasound guidance: no  Approach: anterolateral  Medications administered: 30 mg sodium hyaluronate 30 mg/2 mL    Patient tolerance: patient tolerated the procedure well with no immediate complications  Dressing:  Sterile dressing applied            This document was created using speech voice recognition software. Grammatical errors, random word insertions, pronoun errors, and incomplete sentences are an occasional consequence of this system due to software limitations, ambient noise, and hardware issues. Any formal questions or concerns about content, text, or information contained within the body of this dictation should be directly addressed to the provider for clarification.

## 2023-07-31 ENCOUNTER — PROCEDURE VISIT (OUTPATIENT)
Dept: OBGYN CLINIC | Facility: CLINIC | Age: 78
End: 2023-07-31
Payer: COMMERCIAL

## 2023-07-31 VITALS
DIASTOLIC BLOOD PRESSURE: 72 MMHG | WEIGHT: 240 LBS | HEIGHT: 70 IN | SYSTOLIC BLOOD PRESSURE: 110 MMHG | BODY MASS INDEX: 34.36 KG/M2 | HEART RATE: 62 BPM

## 2023-07-31 DIAGNOSIS — M17.11 PRIMARY OSTEOARTHRITIS OF RIGHT KNEE: Primary | ICD-10-CM

## 2023-07-31 PROCEDURE — 20610 DRAIN/INJ JOINT/BURSA W/O US: CPT | Performed by: PHYSICIAN ASSISTANT

## 2023-07-31 RX ORDER — METOPROLOL SUCCINATE 100 MG/1
TABLET, EXTENDED RELEASE ORAL
COMMUNITY
Start: 2023-06-11

## 2023-07-31 RX ORDER — OMEGA-3-ACID ETHYL ESTERS 1 G/1
CAPSULE, LIQUID FILLED ORAL
COMMUNITY
Start: 2023-07-06

## 2023-07-31 RX ORDER — CALCITRIOL 0.25 UG/1
CAPSULE, LIQUID FILLED ORAL
COMMUNITY
Start: 2023-07-14

## 2023-07-31 RX ORDER — POTASSIUM CHLORIDE 1500 MG/1
TABLET, EXTENDED RELEASE ORAL
COMMUNITY
Start: 2023-07-14

## 2023-07-31 RX ORDER — LOSARTAN POTASSIUM 50 MG/1
TABLET ORAL
COMMUNITY
Start: 2023-06-11

## 2023-07-31 RX ORDER — ROSUVASTATIN CALCIUM 5 MG/1
TABLET, COATED ORAL
COMMUNITY
Start: 2023-06-11

## 2023-07-31 NOTE — PROGRESS NOTES
Assessment/Plan:  1. Primary osteoarthritis of right knee          Follow-up prn. Subjective:   Sonja Myrick is a 69 y.o. male who presents today for orthovisc #3 right knee. Review of Systems      Past Medical History:   Diagnosis Date   • Arthritis    • Chronic kidney disease    • Diabetes mellitus ([address])    • History of wound infection ? 2013    RIGHT LOWER LEG. WAS + FOR STAPH INFECTION AT THAT TIME   • Hypertension    • Shoulder abrasion     right side after a fall in Feb 2018       Past Surgical History:   Procedure Laterality Date   • CHOLECYSTECTOMY     • COLONOSCOPY     • JOINT REPLACEMENT     • KNEE ARTHROPLASTY Left 2014   • [place] EXC B9 LESION MRGN XCP SK TG S/N/H/F/G 1.1-2.0CM Left 7/30/2018    Procedure: EXCISIONAL BIOPSY BENIGN NEOPLASM OF SKIN EXTREMITY;  Surgeon: Ezell Kehr., DPM;  Location: [facility] ([facility]];  Service: Podiatry   • STEROID INJECTION SHOULDER Right     8 CERIVAL SPINE INJECTIONS   • TONSILLECTOMY         History reviewed. No pertinent family history. Social History     Occupational History   • Not on file   Tobacco Use   • Smoking status: Every Day     Types: Cigars   • Smokeless tobacco: Never   • Tobacco comments:     3-5 cigars a day   Substance and Sexual Activity   • Alcohol use: Yes     Alcohol/week: 5.0 standard drinks of alcohol     Types: 5 Cans of beer per week     Comment: DAY,SOMETIMES MORE PER PATIENT   • Drug use: No   • Sexual activity: Not on file         Current Outpatient Medications:   •  aspirin (ECOTRIN LOW STRENGTH) 81 mg EC tablet, Take 81 mg by mouth daily, Disp: , Rfl:   •  eplerenone (INSPRA) 50 MG tablet, Take 50 mg by mouth 2 (two) times a day Takes 2 tabs bid, Disp: , Rfl:   •  glipiZIDE (GLUCOTROL) 5 mg tablet, Take 5 mg by mouth every morning, Disp: , Rfl:   •  insulin lispro protamine-insulin lispro (HumaLOG 75/25) 100 units/mL, Inject under the skin 2 (two) times a day before meals 44 units a.m.  And 48 units hs, Disp: , Rfl:   • Klor-Con M20 20 MEQ tablet, , Disp: , Rfl:   •  losartan (COZAAR) 50 mg tablet, , Disp: , Rfl:   •  metoprolol succinate (TOPROL-XL) 100 mg 24 hr tablet, , Disp: , Rfl:   •  omega-3-acid ethyl esters (LOVAZA) 1 g capsule, , Disp: , Rfl:   •  rosuvastatin (CRESTOR) 5 mg tablet, , Disp: , Rfl:   •  torsemide (DEMADEX) 20 mg tablet, Take 20 mg by mouth 2 (two) times a day Takes 2 tabs bid, Disp: , Rfl:   •  calcitriol (ROCALTROL) 0.25 mcg capsule, , Disp: , Rfl:   •  Potassium Chloride (KCL-20 PO), Take 10 mEq by mouth daily at bedtime (Patient not taking: Reported on 6/27/2023), Disp: , Rfl:     Allergies   Allergen Reactions   • Ibuprofen      To avoid due to kidney problems       Objective:  Vitals:    07/31/23 1001   BP: 110/72   Pulse: 62            Ortho Exam    Physical Exam    Large joint arthrocentesis: R knee  Universal Protocol:  Risks and benefits: risks, benefits and alternatives were discussed  Consent given by: patient  Timeout called at: 7/31/2023 10:31 AM.  Site marked: the operative site was marked  Supporting Documentation  Indications: pain   Procedure Details  Location: knee - R knee  Preparation: Patient was prepped and draped in the usual sterile fashion (Alcohol prep)  Needle size: 22 G  Ultrasound guidance: no  Approach: anterolateral  Medications administered: 30 mg sodium hyaluronate 30 mg/2 mL    Patient tolerance: patient tolerated the procedure well with no immediate complications  Dressing:  Sterile dressing applied              This document was created using speech voice recognition software. Grammatical errors, random word insertions, pronoun errors, and incomplete sentences are an occasional consequence of this system due to software limitations, ambient noise, and hardware issues. Any formal questions or concerns about content, text, or information contained within the body of this dictation should be directly addressed to the provider for clarification.

## 2024-09-25 ENCOUNTER — OFFICE VISIT (OUTPATIENT)
Dept: WOUND CARE | Facility: HOSPITAL | Age: 79
End: 2024-09-25
Payer: COMMERCIAL

## 2024-09-25 ENCOUNTER — HOSPITAL ENCOUNTER (OUTPATIENT)
Dept: RADIOLOGY | Facility: HOSPITAL | Age: 79
Discharge: HOME | End: 2024-09-25
Payer: COMMERCIAL

## 2024-09-25 VITALS — TEMPERATURE: 97.2 F | BODY MASS INDEX: 36.37 KG/M2 | RESPIRATION RATE: 15 BRPM | WEIGHT: 240 LBS | HEIGHT: 68 IN

## 2024-09-25 DIAGNOSIS — M14.679 CHARCOT ARTHROPATHY OF MIDFOOT: ICD-10-CM

## 2024-09-25 DIAGNOSIS — L97.529 ULCER OF LEFT FOOT, UNSPECIFIED ULCER STAGE (HCC): ICD-10-CM

## 2024-09-25 DIAGNOSIS — L97.422 DIABETIC ULCER OF LEFT MIDFOOT ASSOCIATED WITH TYPE 2 DIABETES MELLITUS, WITH FAT LAYER EXPOSED (HCC): Primary | ICD-10-CM

## 2024-09-25 DIAGNOSIS — E11.621 DIABETIC ULCER OF LEFT MIDFOOT ASSOCIATED WITH TYPE 2 DIABETES MELLITUS, WITH FAT LAYER EXPOSED (HCC): Primary | ICD-10-CM

## 2024-09-25 DIAGNOSIS — R09.89 WEAK ARTERIAL PULSE: ICD-10-CM

## 2024-09-25 PROCEDURE — 99204 OFFICE O/P NEW MOD 45 MIN: CPT | Performed by: STUDENT IN AN ORGANIZED HEALTH CARE EDUCATION/TRAINING PROGRAM

## 2024-09-25 PROCEDURE — 99213 OFFICE O/P EST LOW 20 MIN: CPT | Performed by: STUDENT IN AN ORGANIZED HEALTH CARE EDUCATION/TRAINING PROGRAM

## 2024-09-25 PROCEDURE — 73630 X-RAY EXAM OF FOOT: CPT

## 2024-09-25 PROCEDURE — 11042 DBRDMT SUBQ TIS 1ST 20SQCM/<: CPT | Performed by: STUDENT IN AN ORGANIZED HEALTH CARE EDUCATION/TRAINING PROGRAM

## 2024-09-25 RX ORDER — LIDOCAINE 40 MG/G
CREAM TOPICAL ONCE
Status: COMPLETED | OUTPATIENT
Start: 2024-09-25 | End: 2024-09-25

## 2024-09-25 RX ORDER — ALLOPURINOL 100 MG/1
100 TABLET ORAL 2 TIMES DAILY
COMMUNITY

## 2024-09-25 RX ORDER — DAPAGLIFLOZIN 5 MG/1
TABLET, FILM COATED ORAL DAILY
COMMUNITY

## 2024-09-25 RX ADMIN — LIDOCAINE: 40 CREAM TOPICAL at 13:02

## 2024-09-25 NOTE — PATIENT INSTRUCTIONS
Orders Placed This Encounter   Procedures    Wound cleansing and dressings Diabetic Ulcer Left Plantar     LEFT FOOT WOUND:    Wash your hands with soap and water.  Remove old dressing, discard into plastic bag and place in trash.  Cleanse the wound with soap and water prior to applying a clean dressing. Do not use tissue or cotton balls. Do not scrub the wound. Pat dry using gauze.  Shower no . We do not want the wound getting wet in the shower.  Apply moisturizer to skin surrounding wound  Apply Dermagran to the foot wound.  Cover with gauze  Secure with rolled gauze and tape.  Change dressing three times per day.    Off-loading Instructions:    Keep weight and pressure off wound at all times.  Wear off-loading device (surgical shoe that has been padded for offloading for you) as directed by your physician. Put on immediately when rising in the morning and remove when going to bed.    Protein: Eat protein with each meal to promote healing.  Examples of protein are fish, meat, chicken, nuts, peanut butter, eggs, lentils, edamame or a protein shake.    Wound infection:  If you have signs of infection please call the wound center.  If the wound center is closed- please go to the Emergency department.  Some signs of infection:  fever, chills, increased redness, red streaks, increase in pain, increased drainage.  Drainage with an odor, Change in drainage color: white/milky/green/tan/yellow,  an increase in swelling, chest pain and/or shortness of breath.    Please schedule vascular studies as discussed at your appointment. (The studies done by [facility] do not include all the pressure measurements we need)  Have xray of left foot done as ordered.     Standing Status:   Future     Standing Expiration Date:   10/2/2024    XR foot 3+ vw left     Weightbearing please     Standing Status:   Future     Standing Expiration Date:   9/25/2028     Scheduling Instructions:      Bring along any outside films relating to this  procedure.

## 2024-09-25 NOTE — PROGRESS NOTES
Wound Procedure Treatment Diabetic Ulcer Left Plantar    Performed by: Nohemy Gonzalez RN  Authorized by: Ollie White DPM    Associated wounds:   Wound 09/25/24 Diabetic Ulcer Plantar Left  Wound cleansed with:  NSS  Applied to periwound:  Moisture lotion  Applied primary dressing:  Dermagran  Applied secondary dressing:  Gauze  Dressing secured with:  Amandeep and Tape  Offloading device appllied:  Surgical shoe

## 2024-09-26 NOTE — PROGRESS NOTES
Patient ID: Stewart Flores is a 79 y.o. male Date of Birth 1945     My role is Foot, Ankle and Wound Specialist    PLAN:    -Educated patient on their condition.   -The patient's wound is not currently infected. We discussed the importance of recognizing systemic and local signs of infection and going directly to the emergency department should any of these occur  -Debridement was performed as below in order to decrease the risk of infection and promote healing:   -Dressings: dermagran, dry sterile dressing, 3x weekly dressing changes  -Discussed proper care of dressings, patient is not to get them wet at all. If the dressings do get wet, they must be removed and redressed.  -return to wound care in 1-week for possible total contact casting  -Patient and wife verbalize understanding of plan    Patient optimization:  -Vascular:   Arterial -follow-up new lower extremity arterial duplex studies   Venous -no current evidence of lower extremity venous disease   Lymphatic -no evidence of lower extremity lymphatic disease    -MSK:   Pressure reduction -padded surgical shoe for now, will consider total contact casting at the patient's next visit   Deformity and possible correction -noted Charcot neuroarthropathy rocker-bottom foot deformity with prominence to the plantar medial aspect.  Will follow-up x-ray for formal classification   Inserts/DME -patient will require custom molded diabetic sneakers once wound healing is achieved    -Neuro:   Neuropathy - We discussed checking feet daily for additional cuts, bruises, or wounds. We also discussed refraining from walking barefoot and wearing white socks in order to notice drainage    -Nutritional:   Dietary supplementation - Recommend high protein diet with Ricardo supplementation until wound is fully healed   Smoking cessation -patient does use nicotine products   Glycemic control -most recent hemoglobin A1c from 9/16/2024 8.4%    -Imaging/diagnositics:   X-ray  "-follow-up left foot x-ray   MRI -we will consider MRI in the future should the patient's wound worsen   Biopsy -no indication for biopsy at this time, showed no healing be achieved within the next 3 to 4 months we will consider biopsy    -Infection management:   Wound culture -unable to obtain meaningful deep wound culture at this time   Antibiotic -no indication for antibiotic therapy at this time    Debridement   Wound 09/25/24 Diabetic Ulcer Plantar Left    Universal Protocol:  procedure performed by consultantConsent: Verbal consent obtained.  Risks and benefits: risks, benefits and alternatives were discussed  Consent given by: patient  Time out: Immediately prior to procedure a \"time out\" was called to verify the correct patient, procedure, equipment, support staff and site/side marked as required.  Patient understanding: patient states understanding of the procedure being performed  Patient identity confirmed: verbally with patient    Debridement Details  Performed by: physician  Debridement type: surgical  Level of debridement: subcutaneous tissue  Pain control: lidocaine 4%      Post-debridement measurements  Length (cm): 1  Width (cm): 1.6  Depth (cm): 0.1  Percent debrided: 90%  Surface Area (cm^2): 1.6  Area Debrided (cm^2): 1.44  Volume (cm^3): 0.16    Tissue and other material debrided: dermis, epidermis and subcutaneous tissue  Devitalized tissue debrided: biofilm, exudate and fibrin  Instrument(s) utilized: blade  Bleeding: small  Hemostasis obtained with: pressure  Procedural pain (0-10): insensate  Post-procedural pain: insensate   Response to treatment: procedure was tolerated well         Wound Instructions    Orders Placed This Encounter   Procedures    Wound cleansing and dressings Diabetic Ulcer Left Plantar     LEFT FOOT WOUND:    Wash your hands with soap and water.  Remove old dressing, discard into plastic bag and place in trash.  Cleanse the wound with soap and water prior to applying a " clean dressing. Do not use tissue or cotton balls. Do not scrub the wound. Pat dry using gauze.  Shower no . We do not want the wound getting wet in the shower.  Apply moisturizer to skin surrounding wound  Apply Dermagran to the foot wound.  Cover with gauze  Secure with rolled gauze and tape.  Change dressing three times per day.    Off-loading Instructions:    Keep weight and pressure off wound at all times.  Wear off-loading device (surgical shoe that has been padded for offloading for you) as directed by your physician. Put on immediately when rising in the morning and remove when going to bed.    Protein: Eat protein with each meal to promote healing.  Examples of protein are fish, meat, chicken, nuts, peanut butter, eggs, lentils, edamame or a protein shake.    Wound infection:  If you have signs of infection please call the wound center.  If the wound center is closed- please go to the Emergency department.  Some signs of infection:  fever, chills, increased redness, red streaks, increase in pain, increased drainage.  Drainage with an odor, Change in drainage color: white/milky/green/tan/yellow,  an increase in swelling, chest pain and/or shortness of breath.    Please schedule vascular studies as discussed at your appointment. (The studies done by [facility] do not include all the pressure measurements we need)  Have xray of left foot done as ordered.     Standing Status:   Future     Standing Expiration Date:   10/2/2024    Wound Procedure Treatment Diabetic Ulcer Left Plantar     This order was created via procedure documentation    Debridement     This order was created via procedure documentation    XR foot 3+ vw left     Weightbearing please     Standing Status:   Future     Number of Occurrences:   1     Standing Expiration Date:   9/25/2028     Scheduling Instructions:      Bring along any outside films relating to this procedure.               SUBJECTIVE:    Chief complaint  Left foot  ulcer  9/25/2024  : Dao is a pleasant 79-year-old male with a past medical history significant for type 2 diabetes, peripheral arterial disease, Charcot neuroarthropathy.  He presents today with a wound to the bottom of his left foot.  He does not recall how this wound started and states that it is not overall painful.  He states that he first noticed it about 1 month ago after a long day of walking.  In regard to his foot deformity, he states that he first noticed his foot becoming misshapen approximately 1 year ago.  He states that at the time the foot was red, hot, and swollen.  He denies any systemic signs of infection over the past week.        The following portions of the patient's history were reviewed and updated as appropriate:   There is no problem list on file for this patient.    Past Medical History:   Diagnosis Date    Arthritis     Chronic kidney disease     Diabetes mellitus (HCC)     History of wound infection ?2013    RIGHT LOWER LEG. WAS + FOR STAPH INFECTION AT THAT TIME    Hypertension     Shoulder abrasion     right side after a fall in Feb 2018     Past Surgical History:   Procedure Laterality Date    CHOLECYSTECTOMY      COLONOSCOPY      JOINT REPLACEMENT      KNEE ARTHROPLASTY Left 2014    MT EXC B9 LESION MRGN XCP SK TG S/N/H/F/G 1.1-2.0CM Left 7/30/2018    Procedure: EXCISIONAL BIOPSY BENIGN NEOPLASM OF SKIN EXTREMITY;  Surgeon: Julio Cesar Trejo Jr., DPM;  Location: [facility] OR;  Service: Podiatry    STEROID INJECTION SHOULDER Right     8 CERIVAL SPINE INJECTIONS    TONSILLECTOMY       Social History     Socioeconomic History    Marital status: /Civil Union     Spouse name: None    Number of children: None    Years of education: None    Highest education level: None   Occupational History    None   Tobacco Use    Smoking status: Every Day     Types: Cigars    Smokeless tobacco: Never    Tobacco comments:     3-5 cigars a day   Substance and Sexual Activity    Alcohol use:  Yes     Alcohol/week: 5.0 standard drinks of alcohol     Types: 5 Cans of beer per week     Comment: DAY,SOMETIMES MORE PER PATIENT    Drug use: No    Sexual activity: None   Other Topics Concern    None   Social History Narrative    None     Social Determinants of Health     Financial Resource Strain: Not on file   Food Insecurity: Not on file   Transportation Needs: Not on file   Physical Activity: Not on file   Stress: Not on file   Social Connections: Not on file   Intimate Partner Violence: Not on file   Housing Stability: Not on file        Current Outpatient Medications:     allopurinol (ZYLOPRIM) 100 mg tablet, Take 100 mg by mouth 2 (two) times a day, Disp: , Rfl:     aspirin (ECOTRIN LOW STRENGTH) 81 mg EC tablet, Take 81 mg by mouth daily, Disp: , Rfl:     dapagliflozin (Farxiga) 5 MG TABS, Take by mouth daily, Disp: , Rfl:     eplerenone (INSPRA) 50 MG tablet, Take 50 mg by mouth 2 (two) times a day Takes 2 tabs bid, Disp: , Rfl:     glipiZIDE (GLUCOTROL) 5 mg tablet, Take 5 mg by mouth every morning, Disp: , Rfl:     insulin lispro protamine-insulin lispro (HumaLOG 75/25) 100 units/mL, Inject under the skin 2 (two) times a day before meals 52units a.m. And 50 units hs, Disp: , Rfl:     Klor-Con M20 20 MEQ tablet, , Disp: , Rfl:     losartan (COZAAR) 50 mg tablet, , Disp: , Rfl:     metoprolol succinate (TOPROL-XL) 100 mg 24 hr tablet, , Disp: , Rfl:     omega-3-acid ethyl esters (LOVAZA) 1 g capsule, , Disp: , Rfl:     rosuvastatin (CRESTOR) 5 mg tablet, , Disp: , Rfl:     torsemide (DEMADEX) 20 mg tablet, Take 20 mg by mouth 2 (two) times a day 1 tablet in AM and 1/2 tablet pm, Disp: , Rfl:     calcitriol (ROCALTROL) 0.25 mcg capsule, , Disp: , Rfl:     Potassium Chloride (KCL-20 PO), Take 10 mEq by mouth daily at bedtime (Patient not taking: Reported on 6/27/2023), Disp: , Rfl:   No current facility-administered medications for this visit.  No family history on file.   Review of Systems  "  Constitutional:  Negative for appetite change, chills, diaphoresis, fatigue and fever.   HENT: Negative.     Gastrointestinal: Negative.    Skin:  Positive for color change and wound.       Allergies  Ibuprofen    OBJECTIVE:  Temp (!) 97.2 °F (36.2 °C)   Resp 15   Ht 5' 8\" (1.727 m)   Wt 109 kg (240 lb)   BMI 36.49 kg/m²     Physical Exam  Constitutional:       Appearance: Normal appearance. He is not ill-appearing or diaphoretic.   HENT:      Head: Normocephalic and atraumatic.   Eyes:      General:         Right eye: No discharge.         Left eye: No discharge.   Pulmonary:      Effort: Pulmonary effort is normal. No respiratory distress.   Musculoskeletal:      Comments: Left foot deformity noted with hallux abductovalgus, pes planus/slight rocker-bottom deformity noted with plantar medial prominence at the level of the navicular tuberosity.   Skin:     Capillary Refill: Capillary refill takes less than 2 seconds.      Comments: See wound assessment below   Neurological:      Mental Status: He is alert.      Sensory: Sensory deficit (7/10 locations felt with monofilament of the left foot) present.   Psychiatric:         Mood and Affect: Mood normal.           Wound 09/25/24 Diabetic Ulcer Plantar Left (Active)   Enter Spence score: Spence Grade 2: Deep ulcer extended to ligament, tendon, joint capsule, bone, or deep fascia without abscess or osteomyelitis (OM) 09/25/24 1258   Wound Image   09/25/24 1258   Wound Description Pink;White 09/25/24 1258   Cindy-wound Assessment Callus;Dry 09/25/24 1258   Wound Length (cm) 1 cm 09/25/24 1258   Wound Width (cm) 1.6 cm 09/25/24 1258   Wound Depth (cm) 0.1 cm 09/25/24 1258   Wound Surface Area (cm^2) 1.6 cm^2 09/25/24 1258   Wound Volume (cm^3) 0.16 cm^3 09/25/24 1258   Calculated Wound Volume (cm^3) 0.16 cm^3 09/25/24 1258   Drainage Amount Small 09/25/24 1258   Drainage Description Serosanguineous 09/25/24 1258   Non-staged Wound Description Full thickness 09/25/24 " 1258   Dressing Status Intact 09/25/24 1258           Wound 09/25/24 Diabetic Ulcer Plantar Left (Active)   Enter Spence score: Spence Grade 2: Deep ulcer extended to ligament, tendon, joint capsule, bone, or deep fascia without abscess or osteomyelitis (OM) 09/25/24 1258   Wound Image   09/25/24 1258   Wound Description Pink;White 09/25/24 1258   Cindy-wound Assessment Callus;Dry 09/25/24 1258   Wound Length (cm) 1 cm 09/25/24 1258   Wound Width (cm) 1.6 cm 09/25/24 1258   Wound Depth (cm) 0.1 cm 09/25/24 1258   Wound Surface Area (cm^2) 1.6 cm^2 09/25/24 1258   Wound Volume (cm^3) 0.16 cm^3 09/25/24 1258   Calculated Wound Volume (cm^3) 0.16 cm^3 09/25/24 1258   Drainage Amount Small 09/25/24 1258   Drainage Description Serosanguineous 09/25/24 1258   Non-staged Wound Description Full thickness 09/25/24 1258   Dressing Status Intact 09/25/24 1258                         Diagnosis:  1. Diabetic ulcer of left midfoot associated with type 2 diabetes mellitus, with fat layer exposed (HCC)  -     lidocaine (LMX) 4 % cream  -     XR foot 3+ vw left; Future; Expected date: 09/25/2024  -     VAS ARTERIAL DUPLEX- LOWER LIMB BILATERAL; Future; Expected date: 09/25/2024  -     Wound cleansing and dressings Diabetic Ulcer Left Plantar; Future  -     Wound Procedure Treatment Diabetic Ulcer Left Plantar  2. Charcot arthropathy of midfoot  -     XR foot 3+ vw left; Future; Expected date: 09/25/2024  -     Wound cleansing and dressings Diabetic Ulcer Left Plantar; Future  -     Wound Procedure Treatment Diabetic Ulcer Left Plantar  3. Weak arterial pulse  -     VAS ARTERIAL DUPLEX- LOWER LIMB BILATERAL; Future; Expected date: 09/25/2024  -     Wound Procedure Treatment Diabetic Ulcer Left Plantar      Diagnosis ICD-10-CM Associated Orders   1. Diabetic ulcer of left midfoot associated with type 2 diabetes mellitus, with fat layer exposed (HCC)  E11.621 lidocaine (LMX) 4 % cream    L97.422 XR foot 3+ vw left     VAS ARTERIAL  DUPLEX- LOWER LIMB BILATERAL     Wound cleansing and dressings Diabetic Ulcer Left Plantar     Wound Procedure Treatment Diabetic Ulcer Left Plantar      2. Charcot arthropathy of midfoot  M14.679 XR foot 3+ vw left     Wound cleansing and dressings Diabetic Ulcer Left Plantar     Wound Procedure Treatment Diabetic Ulcer Left Plantar      3. Weak arterial pulse  R09.89 VAS ARTERIAL DUPLEX- LOWER LIMB BILATERAL     Wound Procedure Treatment Diabetic Ulcer Left Plantar           ASSESSMENT:    1) left plantar medial foot diabetic ulceration to the level of subcutaneous tissue, Spence 2  2) type 2 diabetes with peripheral neuropathy  3) Charcot neuroarthropathy

## 2024-10-02 ENCOUNTER — OFFICE VISIT (OUTPATIENT)
Dept: WOUND CARE | Facility: HOSPITAL | Age: 79
End: 2024-10-02
Payer: COMMERCIAL

## 2024-10-02 VITALS
SYSTOLIC BLOOD PRESSURE: 165 MMHG | HEART RATE: 62 BPM | DIASTOLIC BLOOD PRESSURE: 90 MMHG | RESPIRATION RATE: 18 BRPM | TEMPERATURE: 96.5 F

## 2024-10-02 DIAGNOSIS — L97.509 TYPE 2 DIABETES MELLITUS WITH FOOT ULCER, WITHOUT LONG-TERM CURRENT USE OF INSULIN (HCC): ICD-10-CM

## 2024-10-02 DIAGNOSIS — M14.679 CHARCOT ARTHROPATHY OF MIDFOOT: ICD-10-CM

## 2024-10-02 DIAGNOSIS — E11.621 TYPE 2 DIABETES MELLITUS WITH FOOT ULCER, WITHOUT LONG-TERM CURRENT USE OF INSULIN (HCC): ICD-10-CM

## 2024-10-02 DIAGNOSIS — L97.422 DIABETIC ULCER OF LEFT MIDFOOT ASSOCIATED WITH TYPE 2 DIABETES MELLITUS, WITH FAT LAYER EXPOSED (HCC): Primary | ICD-10-CM

## 2024-10-02 DIAGNOSIS — E11.621 DIABETIC ULCER OF LEFT MIDFOOT ASSOCIATED WITH TYPE 2 DIABETES MELLITUS, WITH FAT LAYER EXPOSED (HCC): Primary | ICD-10-CM

## 2024-10-02 PROCEDURE — 29445 APPL RIGID TOT CNTC LEG CAST: CPT | Performed by: STUDENT IN AN ORGANIZED HEALTH CARE EDUCATION/TRAINING PROGRAM

## 2024-10-02 RX ORDER — LIDOCAINE 40 MG/G
CREAM TOPICAL ONCE
Status: COMPLETED | OUTPATIENT
Start: 2024-10-02 | End: 2024-10-02

## 2024-10-02 RX ADMIN — LIDOCAINE 1 APPLICATION: 40 CREAM TOPICAL at 14:53

## 2024-10-02 NOTE — PATIENT INSTRUCTIONS
Orders Placed This Encounter   Procedures    Wound cleansing and dressings Diabetic Ulcer Left Plantar     Off-loading Instructions:    Total Contact Cast Instructions: Do not get cast wet. Contact wound center if there is a foul odor or becomes uncomfortable due to feeling tight or swelling. Do not use objects down inside of cast to scratch. Do not walk on cast without walking boot.    Limit walking to only necessity.     Please see instructions provided.     Standing Status:   Future     Standing Expiration Date:   10/9/2024    Wound Procedure Treatment Diabetic Ulcer Left Plantar     This order was created via procedure documentation

## 2024-10-02 NOTE — PROGRESS NOTES
Wound Procedure Treatment Diabetic Ulcer Left Plantar    Performed by: Evy Garcia RN  Authorized by: Ollie White DPM    Associated wounds:   Wound 09/25/24 Diabetic Ulcer Plantar Left  Wound cleansed with:  NSS  Applied primary dressing:  Acticoat  Applied secondary dressing:  ABD  Dressing secured with:  Tape  Offloading device appllied:  TCC  Comments:  Acticoat 7 to wound bed, c-shaped felt x2 to periwound, offloading foam to top of ankle, covered with abd.

## 2024-10-04 ENCOUNTER — OFFICE VISIT (OUTPATIENT)
Dept: WOUND CARE | Facility: HOSPITAL | Age: 79
End: 2024-10-04
Payer: COMMERCIAL

## 2024-10-04 VITALS
TEMPERATURE: 96.8 F | SYSTOLIC BLOOD PRESSURE: 144 MMHG | RESPIRATION RATE: 18 BRPM | HEART RATE: 64 BPM | DIASTOLIC BLOOD PRESSURE: 80 MMHG

## 2024-10-04 DIAGNOSIS — E11.621 DIABETIC ULCER OF LEFT MIDFOOT ASSOCIATED WITH TYPE 2 DIABETES MELLITUS, WITH FAT LAYER EXPOSED (HCC): Primary | ICD-10-CM

## 2024-10-04 DIAGNOSIS — M14.679 CHARCOT ARTHROPATHY OF MIDFOOT: ICD-10-CM

## 2024-10-04 DIAGNOSIS — L97.422 DIABETIC ULCER OF LEFT MIDFOOT ASSOCIATED WITH TYPE 2 DIABETES MELLITUS, WITH FAT LAYER EXPOSED (HCC): Primary | ICD-10-CM

## 2024-10-04 DIAGNOSIS — R09.89 WEAK ARTERIAL PULSE: ICD-10-CM

## 2024-10-04 PROCEDURE — 29445 APPL RIGID TOT CNTC LEG CAST: CPT | Performed by: STUDENT IN AN ORGANIZED HEALTH CARE EDUCATION/TRAINING PROGRAM

## 2024-10-04 RX ORDER — LIDOCAINE 40 MG/G
CREAM TOPICAL ONCE
Status: COMPLETED | OUTPATIENT
Start: 2024-10-04 | End: 2024-10-04

## 2024-10-04 RX ADMIN — LIDOCAINE: 40 CREAM TOPICAL at 13:13

## 2024-10-04 NOTE — PROGRESS NOTES
Wound Procedure Treatment Diabetic Ulcer Left Plantar    Performed by: Charlotte Shoemaker LPN  Authorized by: Ollie White DPM    Associated wounds:   Wound 09/25/24 Diabetic Ulcer Plantar Left  Wound cleansed with:  NSS  Applied to periwound:  Skin prep  Applied primary dressing:  Acticoat  Applied secondary dressing:  ABD  Offloading device appllied:  Foam padding and TCC  Comments:  Foam padding over top of the ankle, double felt padded around wound  Acticoat 7 and ABD

## 2024-10-04 NOTE — PATIENT INSTRUCTIONS
Orders Placed This Encounter   Procedures    Wound cleansing and dressings     Total Contact Cast Procedure:    Before application, EVERETT and/or TBI determined to be adequate for healing and application of a Total Contact cast.  A Total Contact Cast procedure was performed for to the Left foot.  The procedure was tolerated well with pain level of 2 throughout and a pain level of 2 following the procedure.     Standing Status:   Future     Standing Expiration Date:   10/11/2024    Wound Procedure Treatment Diabetic Ulcer Left Plantar     This order was created via procedure documentation

## 2024-10-05 NOTE — PROGRESS NOTES
Patient ID: Stewart Flores is a 79 y.o. male Date of Birth 1945     My role is Foot, Ankle and Wound Specialist    PLAN:    -Educated patient on their condition.   -The patient's wound is not currently infected. We discussed the importance of recognizing systemic and local signs of infection and going directly to the emergency department should any of these occur  -Debridement was performed as below in order to decrease the risk of infection and promote healing:   -Dressings: Acticoat 7, foam padding, total contact cast  -Discussed proper care of dressings, patient is not to get them wet at all. If the dressings do get wet, they must be removed and redressed.  -return to wound care in 1 week for cast change  -Patient and wife verbalize understanding of plan     Patient optimization:  -Vascular:              Arterial -follow-up new lower extremity arterial duplex studies              Venous -no current evidence of lower extremity venous disease              Lymphatic -no evidence of lower extremity lymphatic disease     -MSK:              Pressure reduction -padded surgical shoe for now, will consider total contact casting at the patient's next visit              Deformity and possible correction -noted Charcot neuroarthropathy rocker-bottom foot deformity with prominence to the plantar medial aspect.                Inserts/DME -patient will require custom molded diabetic sneakers once wound healing is achieved     -Neuro:              Neuropathy - We discussed checking feet daily for additional cuts, bruises, or wounds. We also discussed refraining from walking barefoot and wearing white socks in order to notice drainage     -Nutritional:              Dietary supplementation - Recommend high protein diet with Ricardo supplementation until wound is fully healed              Smoking cessation -patient does use nicotine products              Glycemic control -most recent hemoglobin A1c from 9/16/2024 8.4%    "  -Imaging/diagnositics:              X-ray - X-ray of left foot from 9/25/24 reviewed: Increased soft-tissue density and volume noted at the midfoot with joint effusion noted at the tarsometatarsal joint. Demineralization/sclerosis noted at the tarsometatarsal joint. Cindy-articular erosions noted at the tarsometatarsal joint. I note the foot in a general rocker-bottom position with collapse at the naviculocuneiform and tarsometatarsal joint. Talar-first metatarsal angle on lateral view -27. Calcaneus-cuboid angle on lateral view 1.2mm.               MRI - we will consider MRI in the future should the patient's wound worsen              Biopsy -no indication for biopsy at this time, should no healing be achieved within the next 3 to 4 months we will consider biopsy     -Infection management:              Wound culture -unable to obtain meaningful deep wound culture at this time              Antibiotic -no indication for antibiotic therapy at this time    Cast Application    Date/Time: 10/4/2024 12:45 PM    Performed by: Ollie White DPM  Authorized by: Ollie White DPM  Universal Protocol:  procedure performed by consultantConsent: Verbal consent obtained.  Risks and benefits: risks, benefits and alternatives were discussed  Consent given by: patient  Time out: Immediately prior to procedure a \"time out\" was called to verify the correct patient, procedure, equipment, support staff and site/side marked as required.  Patient understanding: patient states understanding of the procedure being performed  Site marked: the operative site was marked  Required items: required blood products, implants, devices, and special equipment available  Patient identity confirmed: verbally with patient    Pre-procedure details:     Sensation:  Numbness  Procedure details:     Laterality:  Left    Location:  Leg    Leg:  R lower legCast type:  Total contact      Post-procedure details:     Pain:  Unchanged    Sensation:  Numbness    " "Patient tolerance of procedure:  Tolerated well, no immediate complications  Comments:      I recommended application of the TCC EZ to facilitate healing.     After application of the primary dressing, a TCC EZ cast was applied with the patient in the supine sitting position and a rigid board utilized on the plantar foot to maintain the ankle at 90 degrees. The kit components were applied as follows: stockinette applied in a smooth non-wrinkled fashion, protective felt over the malleoli, tibial crest, toes and plantar foot to 2\" behind heel and secured in place with tape. Care was taken to have a finger's width excess beyond the longest toe to prevent toe impingement. Following this, the protective white sleeve was applied with an excess distally of 3\" and leaving 2\" of excess stockinette proximally. Finally, the cast sock immersed in warm water for 10 seconds and was applied distal to proximal with the foot at a 90 degree angle to the leg.  Care was taken to leave 3\" excess beyond the toes that was folded over and smoothed on the dorsal forefoot.  The cast was smoothed and held on the board in at 90 degrees to leg for 5 minutes. The patient was than sat upright and told to gently rest cast on floor with ankle at 90 degrees for an additional 15 minutes. After complete hardening, the walker boot was applied.      Strict instructions to keep the cast clean,dry and intact were given and also instructions not to introduce powders or foreign objects into the cast was given. Instructions were given to call if any cast breakdown, or any irritation, odor, drainage or pain was noted within the cast.      I explained that good wound care and compliance are necessary to allow healing and to prevent toe loss or limb loss.      No guarantees of wound healing were given and I explained clearly that there is some risk with the use of TCC such as cast \"rub\", cast ulcers, or hidden infection but that the benefits of the TCC far " outweight the risks if patient compliance is in place.            Wound Instructions    Orders Placed This Encounter   Procedures    Wound cleansing and dressings     Total Contact Cast Procedure:    Before application, EVERETT and/or TBI determined to be adequate for healing and application of a Total Contact cast.  A Total Contact Cast procedure was performed for to the Left foot.  The procedure was tolerated well with pain level of 2 throughout and a pain level of 2 following the procedure.     Standing Status:   Future     Standing Expiration Date:   10/11/2024    Wound Procedure Treatment Diabetic Ulcer Left Plantar     This order was created via procedure documentation         SUBJECTIVE:    Chief complaint  Left foot wound    Consult-Stewart is a pleasant 79-year-old male with a past medical history significant for type 2 diabetes, peripheral arterial disease, Charcot neuroarthropathy.  He presents today with a wound to the bottom of his left foot.  He does not recall how this wound started and states that it is not overall painful.  He states that he first noticed it about 1 month ago after a long day of walking.  In regard to his foot deformity, he states that he first noticed his foot becoming misshapen approximately 1 year ago.  He states that at the time the foot was red, hot, and swollen.  He denies any systemic signs of infection over the past week.    10/4/2024: Stewart is doing very well today, he denies any complications since his previous visit.  He denies any systemic signs of infection over the past week.  He states that he tolerated his initial total contact cast well.        The following portions of the patient's history were reviewed and updated as appropriate:   There is no problem list on file for this patient.    Past Medical History:   Diagnosis Date    Arthritis     Chronic kidney disease     Diabetes mellitus (HCC)     History of wound infection ?2013    RIGHT LOWER LEG. WAS + FOR JASPREET  INFECTION AT THAT TIME    Hypertension     Shoulder abrasion     right side after a fall in Feb 2018     Past Surgical History:   Procedure Laterality Date    CHOLECYSTECTOMY      COLONOSCOPY      JOINT REPLACEMENT      KNEE ARTHROPLASTY Left 2014    VT EXC B9 LESION MRGN XCP SK TG S/N/H/F/G 1.1-2.0CM Left 7/30/2018    Procedure: EXCISIONAL BIOPSY BENIGN NEOPLASM OF SKIN EXTREMITY;  Surgeon: Julio Cesar Trejo Jr., DPM;  Location: [facility];  Service: Podiatry    STEROID INJECTION SHOULDER Right     8 CERIVAL SPINE INJECTIONS    TONSILLECTOMY       Social History     Socioeconomic History    Marital status: /Civil Union     Spouse name: Not on file    Number of children: Not on file    Years of education: Not on file    Highest education level: Not on file   Occupational History    Not on file   Tobacco Use    Smoking status: Every Day     Types: Cigars    Smokeless tobacco: Never    Tobacco comments:     3-5 cigars a day   Substance and Sexual Activity    Alcohol use: Yes     Alcohol/week: 5.0 standard drinks of alcohol     Types: 5 Cans of beer per week     Comment: DAY,SOMETIMES MORE PER PATIENT    Drug use: No    Sexual activity: Not on file   Other Topics Concern    Not on file   Social History Narrative    Not on file     Social Determinants of Health     Financial Resource Strain: Not on file   Food Insecurity: Not on file   Transportation Needs: Not on file   Physical Activity: Not on file   Stress: Not on file   Social Connections: Not on file   Intimate Partner Violence: Not on file   Housing Stability: Not on file        Current Outpatient Medications:     allopurinol (ZYLOPRIM) 100 mg tablet, Take 100 mg by mouth 2 (two) times a day, Disp: , Rfl:     aspirin (ECOTRIN LOW STRENGTH) 81 mg EC tablet, Take 81 mg by mouth daily, Disp: , Rfl:     calcitriol (ROCALTROL) 0.25 mcg capsule, , Disp: , Rfl:     dapagliflozin (Farxiga) 5 MG TABS, Take by mouth daily, Disp: , Rfl:     eplerenone (INSPRA) 50 MG  tablet, Take 50 mg by mouth 2 (two) times a day Takes 2 tabs bid, Disp: , Rfl:     glipiZIDE (GLUCOTROL) 5 mg tablet, Take 5 mg by mouth every morning, Disp: , Rfl:     insulin lispro protamine-insulin lispro (HumaLOG 75/25) 100 units/mL, Inject under the skin 2 (two) times a day before meals 52units a.m. And 50 units hs, Disp: , Rfl:     Klor-Con M20 20 MEQ tablet, , Disp: , Rfl:     losartan (COZAAR) 50 mg tablet, , Disp: , Rfl:     metoprolol succinate (TOPROL-XL) 100 mg 24 hr tablet, , Disp: , Rfl:     omega-3-acid ethyl esters (LOVAZA) 1 g capsule, , Disp: , Rfl:     Potassium Chloride (KCL-20 PO), Take 10 mEq by mouth daily at bedtime (Patient not taking: Reported on 6/27/2023), Disp: , Rfl:     rosuvastatin (CRESTOR) 5 mg tablet, , Disp: , Rfl:     torsemide (DEMADEX) 20 mg tablet, Take 20 mg by mouth 2 (two) times a day 1 tablet in AM and 1/2 tablet pm, Disp: , Rfl:   No current facility-administered medications for this visit.  No family history on file.   Review of Systems   Constitutional:  Negative for appetite change, chills, diaphoresis, fatigue and fever.   HENT: Negative.     Gastrointestinal: Negative.    Skin:  Positive for color change and wound.       Allergies  Ibuprofen    OBJECTIVE:  /80   Pulse 64   Temp (!) 96.8 °F (36 °C)   Resp 18     Physical Exam  Constitutional:       Appearance: Normal appearance. He is not ill-appearing or diaphoretic.   HENT:      Head: Normocephalic and atraumatic.   Eyes:      General:         Right eye: No discharge.         Left eye: No discharge.   Pulmonary:      Effort: Pulmonary effort is normal. No respiratory distress.   Musculoskeletal:      Comments: Left foot deformity noted with hallux abductovalgus, pes planus/slight rocker-bottom deformity noted with plantar medial prominence at the level of the midfoot   Skin:     Capillary Refill: Capillary refill takes less than 2 seconds.      Comments: See wound assessment below   Neurological:       Mental Status: He is alert.      Sensory: Sensory deficit (7/10 locations felt with monofilament of the left foot) present.   Psychiatric:         Mood and Affect: Mood normal.           Wound 09/25/24 Diabetic Ulcer Plantar Left (Active)   Enter Spence score: Spence Grade 2: Deep ulcer extended to ligament, tendon, joint capsule, bone, or deep fascia without abscess or osteomyelitis (OM) 10/02/24 1437   Wound Image   10/04/24 1246   Wound Description Pink 10/04/24 1246   Cindy-wound Assessment Callus;Dry;Erythema 10/04/24 1246   Wound Length (cm) 0.5 cm 10/04/24 1246   Wound Width (cm) 1.1 cm 10/04/24 1246   Wound Depth (cm) 0.2 cm 10/04/24 1246   Wound Surface Area (cm^2) 0.55 cm^2 10/04/24 1246   Wound Volume (cm^3) 0.11 cm^3 10/04/24 1246   Calculated Wound Volume (cm^3) 0.11 cm^3 10/04/24 1246   Change in Wound Size % 31.25 10/04/24 1246   Drainage Amount Small 10/04/24 1246   Drainage Description Serosanguineous 10/04/24 1246   Non-staged Wound Description Full thickness 10/04/24 1246   Dressing Status Intact 10/02/24 1437           Wound 09/25/24 Diabetic Ulcer Plantar Left (Active)   Enter Spence score: Spence Grade 2: Deep ulcer extended to ligament, tendon, joint capsule, bone, or deep fascia without abscess or osteomyelitis (OM) 10/02/24 1437   Wound Image   10/04/24 1246   Wound Description Pink 10/04/24 1246   Cindy-wound Assessment Callus;Dry;Erythema 10/04/24 1246   Wound Length (cm) 0.5 cm 10/04/24 1246   Wound Width (cm) 1.1 cm 10/04/24 1246   Wound Depth (cm) 0.2 cm 10/04/24 1246   Wound Surface Area (cm^2) 0.55 cm^2 10/04/24 1246   Wound Volume (cm^3) 0.11 cm^3 10/04/24 1246   Calculated Wound Volume (cm^3) 0.11 cm^3 10/04/24 1246   Change in Wound Size % 31.25 10/04/24 1246   Drainage Amount Small 10/04/24 1246   Drainage Description Serosanguineous 10/04/24 1246   Non-staged Wound Description Full thickness 10/04/24 1246   Dressing Status Intact 10/02/24 1437                         Diagnosis:  1.  Diabetic ulcer of left midfoot associated with type 2 diabetes mellitus, with fat layer exposed (HCC)  -     Wound cleansing and dressings; Future  -     lidocaine (LMX) 4 % cream  -     Wound Procedure Treatment Diabetic Ulcer Left Plantar  2. Weak arterial pulse  -     Wound cleansing and dressings; Future  -     lidocaine (LMX) 4 % cream  -     Wound Procedure Treatment Diabetic Ulcer Left Plantar  3. Charcot arthropathy of midfoot  -     Wound cleansing and dressings; Future  -     lidocaine (LMX) 4 % cream  -     Wound Procedure Treatment Diabetic Ulcer Left Plantar      Diagnosis ICD-10-CM Associated Orders   1. Diabetic ulcer of left midfoot associated with type 2 diabetes mellitus, with fat layer exposed (HCC)  E11.621 Wound cleansing and dressings    L97.422 lidocaine (LMX) 4 % cream     Wound Procedure Treatment Diabetic Ulcer Left Plantar      2. Weak arterial pulse  R09.89 Wound cleansing and dressings     lidocaine (LMX) 4 % cream     Wound Procedure Treatment Diabetic Ulcer Left Plantar      3. Charcot arthropathy of midfoot  M14.679 Wound cleansing and dressings     lidocaine (LMX) 4 % cream     Wound Procedure Treatment Diabetic Ulcer Left Plantar           ASSESSMENT:    1) left plantar medial foot diabetic ulceration to the level of subcutaneous tissue, Spence 2, 66% smaller over the past 10-days  2) type 2 diabetes with peripheral neuropathy  3) Charcot neuroarthropathy

## 2024-10-05 NOTE — PROGRESS NOTES
Patient ID: Stewart Flores is a 79 y.o. male Date of Birth 1945     My role is Foot, Ankle and Wound Specialist    PLAN:    -Educated patient on their condition.   -The patient's wound is not currently infected. We discussed the importance of recognizing systemic and local signs of infection and going directly to the emergency department should any of these occur  -Debridement was performed as below in order to decrease the risk of infection and promote healing:   -Dressings: Acticoat 7, foam padding, total contact cast  -Discussed proper care of dressings, patient is not to get them wet at all. If the dressings do get wet, they must be removed and redressed.  -return to wound care in 48 hours for initial cast change  -Patient and wife verbalize understanding of plan     Patient optimization:  -Vascular:              Arterial -follow-up new lower extremity arterial duplex studies              Venous -no current evidence of lower extremity venous disease              Lymphatic -no evidence of lower extremity lymphatic disease     -MSK:              Pressure reduction -padded surgical shoe for now, will consider total contact casting at the patient's next visit              Deformity and possible correction -noted Charcot neuroarthropathy rocker-bottom foot deformity with prominence to the plantar medial aspect.                Inserts/DME -patient will require custom molded diabetic sneakers once wound healing is achieved     -Neuro:              Neuropathy - We discussed checking feet daily for additional cuts, bruises, or wounds. We also discussed refraining from walking barefoot and wearing white socks in order to notice drainage     -Nutritional:              Dietary supplementation - Recommend high protein diet with Ricardo supplementation until wound is fully healed              Smoking cessation -patient does use nicotine products              Glycemic control -most recent hemoglobin A1c from 9/16/2024  "8.4%     -Imaging/diagnositics:              X-ray - X-ray of left foot from 9/25/24 reviewed: Increased soft-tissue density and volume noted at the midfoot with joint effusion noted at the tarsometatarsal joint. Demineralization/sclerosis noted at the tarsometatarsal joint. Cindy-articular erosions noted at the tarsometatarsal joint. I note the foot in a general rocker-bottom position with collapse at the naviculocuneiform and tarsometatarsal joint. Talar-first metatarsal angle on lateral view -27. Calcaneus-cuboid angle on lateral view 1.2mm.               MRI - we will consider MRI in the future should the patient's wound worsen              Biopsy -no indication for biopsy at this time, should no healing be achieved within the next 3 to 4 months we will consider biopsy     -Infection management:              Wound culture -unable to obtain meaningful deep wound culture at this time              Antibiotic -no indication for antibiotic therapy at this time    Cast Application    Date/Time: 10/2/2024 2:15 PM    Performed by: Ollie White DPM  Authorized by: Ollie White DPM  Universal Protocol:  procedure performed by consultantConsent: Verbal consent obtained.  Risks and benefits: risks, benefits and alternatives were discussed  Consent given by: patient  Time out: Immediately prior to procedure a \"time out\" was called to verify the correct patient, procedure, equipment, support staff and site/side marked as required.  Patient understanding: patient states understanding of the procedure being performed  Site marked: the operative site was marked  Required items: required blood products, implants, devices, and special equipment available  Patient identity confirmed: verbally with patient    Pre-procedure details:     Sensation:  Numbness  Procedure details:     Laterality:  Left    Location:  Leg    Leg:  R lower legCast type:  Total contact      Post-procedure details:     Pain:  Unchanged    Sensation:  " "Numbness    Patient tolerance of procedure:  Tolerated well, no immediate complications  Comments:      I recommended application of the TCC EZ to facilitate healing.     After application of the primary dressing, a TCC EZ cast was applied with the patient in the supine sitting position and a rigid board utilized on the plantar foot to maintain the ankle at 90 degrees. The kit components were applied as follows: stockinette applied in a smooth non-wrinkled fashion, protective felt over the malleoli, tibial crest, toes and plantar foot to 2\" behind heel and secured in place with tape. Care was taken to have a finger's width excess beyond the longest toe to prevent toe impingement. Following this, the protective white sleeve was applied with an excess distally of 3\" and leaving 2\" of excess stockinette proximally. Finally, the cast sock immersed in warm water for 10 seconds and was applied distal to proximal with the foot at a 90 degree angle to the leg.  Care was taken to leave 3\" excess beyond the toes that was folded over and smoothed on the dorsal forefoot.  The cast was smoothed and held on the board in at 90 degrees to leg for 5 minutes. The patient was than sat upright and told to gently rest cast on floor with ankle at 90 degrees for an additional 15 minutes. After complete hardening, the walker boot was applied.      Strict instructions to keep the cast clean,dry and intact were given and also instructions not to introduce powders or foreign objects into the cast was given. Instructions were given to call if any cast breakdown, or any irritation, odor, drainage or pain was noted within the cast.      I explained that good wound care and compliance are necessary to allow healing and to prevent toe loss or limb loss.      No guarantees of wound healing were given and I explained clearly that there is some risk with the use of TCC such as cast \"rub\", cast ulcers, or hidden infection but that the benefits of the " TCC far outweight the risks if patient compliance is in place.            Wound Instructions    Orders Placed This Encounter   Procedures    Wound Procedure Treatment Diabetic Ulcer Left Plantar     This order was created via procedure documentation    Cast Application     This order was created via procedure documentation     SUBJECTIVE:    Chief complaint  Left foot wound    Consult-Stewart is a pleasant 79-year-old male with a past medical history significant for type 2 diabetes, peripheral arterial disease, Charcot neuroarthropathy.  He presents today with a wound to the bottom of his left foot.  He does not recall how this wound started and states that it is not overall painful.  He states that he first noticed it about 1 month ago after a long day of walking.  In regard to his foot deformity, he states that he first noticed his foot becoming misshapen approximately 1 year ago.  He states that at the time the foot was red, hot, and swollen.  He denies any systemic signs of infection over the past week.    10/2/2024: Stewart is doing very well today, he denies any complications since his previous visit.  He denies any systemic signs of infection over the past week.  He states that he did get his x-ray done and would like to review the results.        The following portions of the patient's history were reviewed and updated as appropriate:   There is no problem list on file for this patient.    Past Medical History:   Diagnosis Date    Arthritis     Chronic kidney disease     Diabetes mellitus (HCC)     History of wound infection ?2013    RIGHT LOWER LEG. WAS + FOR STAPH INFECTION AT THAT TIME    Hypertension     Shoulder abrasion     right side after a fall in Feb 2018     Past Surgical History:   Procedure Laterality Date    CHOLECYSTECTOMY      COLONOSCOPY      JOINT REPLACEMENT      KNEE ARTHROPLASTY Left 2014    GA EXC B9 LESION MRGN XCP SK TG S/N/H/F/G 1.1-2.0CM Left 7/30/2018    Procedure: EXCISIONAL BIOPSY  BENIGN NEOPLASM OF SKIN EXTREMITY;  Surgeon: Julio Cesar Trejo Jr., DPM;  Location: [facility];  Service: Podiatry    STEROID INJECTION SHOULDER Right     8 CERIVAL SPINE INJECTIONS    TONSILLECTOMY       Social History     Socioeconomic History    Marital status: /Civil Union     Spouse name: None    Number of children: None    Years of education: None    Highest education level: None   Occupational History    None   Tobacco Use    Smoking status: Every Day     Types: Cigars    Smokeless tobacco: Never    Tobacco comments:     3-5 cigars a day   Substance and Sexual Activity    Alcohol use: Yes     Alcohol/week: 5.0 standard drinks of alcohol     Types: 5 Cans of beer per week     Comment: DAY,SOMETIMES MORE PER PATIENT    Drug use: No    Sexual activity: None   Other Topics Concern    None   Social History Narrative    None     Social Determinants of Health     Financial Resource Strain: Not on file   Food Insecurity: Not on file   Transportation Needs: Not on file   Physical Activity: Not on file   Stress: Not on file   Social Connections: Not on file   Intimate Partner Violence: Not on file   Housing Stability: Not on file        Current Outpatient Medications:     allopurinol (ZYLOPRIM) 100 mg tablet, Take 100 mg by mouth 2 (two) times a day, Disp: , Rfl:     aspirin (ECOTRIN LOW STRENGTH) 81 mg EC tablet, Take 81 mg by mouth daily, Disp: , Rfl:     calcitriol (ROCALTROL) 0.25 mcg capsule, , Disp: , Rfl:     dapagliflozin (Farxiga) 5 MG TABS, Take by mouth daily, Disp: , Rfl:     eplerenone (INSPRA) 50 MG tablet, Take 50 mg by mouth 2 (two) times a day Takes 2 tabs bid, Disp: , Rfl:     glipiZIDE (GLUCOTROL) 5 mg tablet, Take 5 mg by mouth every morning, Disp: , Rfl:     insulin lispro protamine-insulin lispro (HumaLOG 75/25) 100 units/mL, Inject under the skin 2 (two) times a day before meals 52units a.m. And 50 units hs, Disp: , Rfl:     Klor-Con M20 20 MEQ tablet, , Disp: , Rfl:     losartan (COZAAR)  50 mg tablet, , Disp: , Rfl:     metoprolol succinate (TOPROL-XL) 100 mg 24 hr tablet, , Disp: , Rfl:     omega-3-acid ethyl esters (LOVAZA) 1 g capsule, , Disp: , Rfl:     Potassium Chloride (KCL-20 PO), Take 10 mEq by mouth daily at bedtime (Patient not taking: Reported on 6/27/2023), Disp: , Rfl:     rosuvastatin (CRESTOR) 5 mg tablet, , Disp: , Rfl:     torsemide (DEMADEX) 20 mg tablet, Take 20 mg by mouth 2 (two) times a day 1 tablet in AM and 1/2 tablet pm, Disp: , Rfl:   No current facility-administered medications for this visit.  No family history on file.   Review of Systems   Constitutional:  Negative for appetite change, chills, diaphoresis, fatigue and fever.   HENT: Negative.     Gastrointestinal: Negative.    Skin:  Positive for color change and wound.       Allergies  Ibuprofen    OBJECTIVE:  /90   Pulse 62   Temp (!) 96.5 °F (35.8 °C)   Resp 18     Physical Exam  Constitutional:       Appearance: Normal appearance. He is not ill-appearing or diaphoretic.   HENT:      Head: Normocephalic and atraumatic.   Eyes:      General:         Right eye: No discharge.         Left eye: No discharge.   Pulmonary:      Effort: Pulmonary effort is normal. No respiratory distress.   Musculoskeletal:      Comments: Left foot deformity noted with hallux abductovalgus, pes planus/slight rocker-bottom deformity noted with plantar medial prominence at the level of the midfoot   Skin:     Capillary Refill: Capillary refill takes less than 2 seconds.      Comments: See wound assessment below   Neurological:      Mental Status: He is alert.      Sensory: Sensory deficit (7/10 locations felt with monofilament of the left foot) present.   Psychiatric:         Mood and Affect: Mood normal.           Wound 09/25/24 Diabetic Ulcer Plantar Left (Active)   Enter Spence score: Spence Grade 2: Deep ulcer extended to ligament, tendon, joint capsule, bone, or deep fascia without abscess or osteomyelitis (OM) 10/02/24 9613    Wound Image   10/04/24 1246   Wound Description Pink 10/04/24 1246   Cindy-wound Assessment Callus;Dry;Erythema 10/04/24 1246   Wound Length (cm) 0.5 cm 10/04/24 1246   Wound Width (cm) 1.1 cm 10/04/24 1246   Wound Depth (cm) 0.2 cm 10/04/24 1246   Wound Surface Area (cm^2) 0.55 cm^2 10/04/24 1246   Wound Volume (cm^3) 0.11 cm^3 10/04/24 1246   Calculated Wound Volume (cm^3) 0.11 cm^3 10/04/24 1246   Change in Wound Size % 31.25 10/04/24 1246   Drainage Amount Small 10/04/24 1246   Drainage Description Serosanguineous 10/04/24 1246   Non-staged Wound Description Full thickness 10/04/24 1246   Dressing Status Intact 10/02/24 1437           Wound 09/25/24 Diabetic Ulcer Plantar Left (Active)   Enter Spence score: Spence Grade 2: Deep ulcer extended to ligament, tendon, joint capsule, bone, or deep fascia without abscess or osteomyelitis (OM) 10/02/24 1437   Wound Image   10/04/24 1246   Wound Description Pink 10/04/24 1246   Cindy-wound Assessment Callus;Dry;Erythema 10/04/24 1246   Wound Length (cm) 0.5 cm 10/04/24 1246   Wound Width (cm) 1.1 cm 10/04/24 1246   Wound Depth (cm) 0.2 cm 10/04/24 1246   Wound Surface Area (cm^2) 0.55 cm^2 10/04/24 1246   Wound Volume (cm^3) 0.11 cm^3 10/04/24 1246   Calculated Wound Volume (cm^3) 0.11 cm^3 10/04/24 1246   Change in Wound Size % 31.25 10/04/24 1246   Drainage Amount Small 10/04/24 1246   Drainage Description Serosanguineous 10/04/24 1246   Non-staged Wound Description Full thickness 10/04/24 1246   Dressing Status Intact 10/02/24 1437     Diagnosis:  1. Diabetic ulcer of left midfoot associated with type 2 diabetes mellitus, with fat layer exposed (HCC)  -     lidocaine (LMX) 4 % cream  -     Wound Procedure Treatment Diabetic Ulcer Left Plantar  2. Charcot arthropathy of midfoot  3. Type 2 diabetes mellitus with foot ulcer, without long-term current use of insulin (HCC)      Diagnosis ICD-10-CM Associated Orders   1. Diabetic ulcer of left midfoot associated with type 2  diabetes mellitus, with fat layer exposed ([facility])  E11.621 lidocaine (LMX) 4 % cream    L97.422 Wound Procedure Treatment Diabetic Ulcer Left Plantar      2. Charcot arthropathy of midfoot  M14.679       3. Type 2 diabetes mellitus with foot ulcer, without long-term current use of insulin ([facility])  E11.621     L97.509            ASSESSMENT:    1) left plantar medial foot diabetic ulceration to the level of subcutaneous tissue, Spence 2, 48% smaller over the past week  2) type 2 diabetes with peripheral neuropathy  3) Charcot neuroarthropathy

## 2024-10-11 ENCOUNTER — OFFICE VISIT (OUTPATIENT)
Dept: WOUND CARE | Facility: HOSPITAL | Age: 79
End: 2024-10-11
Payer: COMMERCIAL

## 2024-10-11 VITALS
TEMPERATURE: 97.3 F | SYSTOLIC BLOOD PRESSURE: 139 MMHG | HEART RATE: 73 BPM | DIASTOLIC BLOOD PRESSURE: 87 MMHG | RESPIRATION RATE: 16 BRPM

## 2024-10-11 DIAGNOSIS — L97.422 DIABETIC ULCER OF LEFT MIDFOOT ASSOCIATED WITH TYPE 2 DIABETES MELLITUS, WITH FAT LAYER EXPOSED (HCC): Primary | ICD-10-CM

## 2024-10-11 DIAGNOSIS — L97.509 TYPE 2 DIABETES MELLITUS WITH FOOT ULCER, WITHOUT LONG-TERM CURRENT USE OF INSULIN (HCC): ICD-10-CM

## 2024-10-11 DIAGNOSIS — E11.621 TYPE 2 DIABETES MELLITUS WITH FOOT ULCER, WITHOUT LONG-TERM CURRENT USE OF INSULIN (HCC): ICD-10-CM

## 2024-10-11 DIAGNOSIS — M14.679 CHARCOT ARTHROPATHY OF MIDFOOT: ICD-10-CM

## 2024-10-11 DIAGNOSIS — E11.621 DIABETIC ULCER OF LEFT MIDFOOT ASSOCIATED WITH TYPE 2 DIABETES MELLITUS, WITH FAT LAYER EXPOSED (HCC): Primary | ICD-10-CM

## 2024-10-11 PROCEDURE — 29445 APPL RIGID TOT CNTC LEG CAST: CPT | Performed by: STUDENT IN AN ORGANIZED HEALTH CARE EDUCATION/TRAINING PROGRAM

## 2024-10-11 RX ORDER — LIDOCAINE 40 MG/G
CREAM TOPICAL ONCE
Status: COMPLETED | OUTPATIENT
Start: 2024-10-11 | End: 2024-10-11

## 2024-10-11 RX ADMIN — LIDOCAINE: 40 CREAM TOPICAL at 13:48

## 2024-10-11 NOTE — PROGRESS NOTES
Wound Procedure Treatment Diabetic Ulcer Left Plantar    Performed by: Nohemy Gonzalez RN  Authorized by: Ollie White DPM    Associated wounds:   Wound 09/25/24 Diabetic Ulcer Plantar Left  Wound cleansed with:  NSS and Soap and water  Applied to periwound:  Moisture lotion and Skin prep  Applied primary dressing:  Non adherent contact layer  Offloading device appllied:  Felt padding 1/4 inch and TCC  Comments:  Felt offloading padding around wound. Bony prominences padded with mepilex. Xeroform applied to wound.

## 2024-10-11 NOTE — PATIENT INSTRUCTIONS
Orders Placed This Encounter   Procedures    Wound cleansing and dressings Diabetic Ulcer Left Plantar     LEFT FOOT WOUND:    Total Contact Cast Instructions: Do not get cast wet. Contact wound center if there is a foul odor or becomes uncomfortable due to feeling tight or swelling. Do not use objects down inside of cast to scratch. Do not walk on cast without walking boot.     Standing Status:   Future     Standing Expiration Date:   10/18/2024

## 2024-10-13 NOTE — PROGRESS NOTES
Patient ID: Stewart Flores is a 79 y.o. male Date of Birth 1945     My role is Foot, Ankle and Wound Specialist    PLAN:    -Educated patient on their condition.   -The patient's wound is not currently infected. We discussed the importance of recognizing systemic and local signs of infection and going directly to the emergency department should any of these occur  -Debridement was performed as below in order to decrease the risk of infection and promote healing:   -Dressings: total contact cast  -Discussed proper care of dressings, patient is not to get them wet at all. If the dressings do get wet, they must be removed and redressed.  -return to wound care in 1 week for cast change  -Patient and wife verbalize understanding of plan     Patient optimization:  -Vascular:              Arterial -follow-up new lower extremity arterial duplex studies              Venous -no current evidence of lower extremity venous disease              Lymphatic -no evidence of lower extremity lymphatic disease     -MSK:              Pressure reduction -total contact casting              Deformity and possible correction -noted Charcot neuroarthropathy rocker-bottom foot deformity with prominence to the plantar medial aspect.                Inserts/DME -patient will require custom molded diabetic sneakers once wound healing is achieved     -Neuro:              Neuropathy - We discussed checking feet daily for additional cuts, bruises, or wounds. We also discussed refraining from walking barefoot and wearing white socks in order to notice drainage     -Nutritional:              Dietary supplementation - Recommend high protein diet with Ricardo supplementation until wound is fully healed              Smoking cessation -patient does use nicotine products              Glycemic control -most recent hemoglobin A1c from 9/16/2024 8.4%     -Imaging/diagnositics:              X-ray - X-ray of left foot from 9/25/24 reviewed: Increased  "soft-tissue density and volume noted at the midfoot with joint effusion noted at the tarsometatarsal joint. Demineralization/sclerosis noted at the tarsometatarsal joint. Cindy-articular erosions noted at the tarsometatarsal joint. I note the foot in a general rocker-bottom position with collapse at the naviculocuneiform and tarsometatarsal joint. Talar-first metatarsal angle on lateral view -27. Calcaneus-cuboid angle on lateral view 1.2mm.               MRI - we will consider MRI in the future should the patient's wound worsen              Biopsy -no indication for biopsy at this time, should no healing be achieved within the next 3 to 4 months we will consider biopsy     -Infection management:              Wound culture -unable to obtain meaningful deep wound culture at this time              Antibiotic -no indication for antibiotic therapy at this time    Cast Application    Date/Time: 10/11/2024 1:45 PM    Performed by: Ollie White DPM  Authorized by: Ollie White DPM  Universal Protocol:  procedure performed by consultantConsent: Verbal consent obtained.  Risks and benefits: risks, benefits and alternatives were discussed  Consent given by: patient  Time out: Immediately prior to procedure a \"time out\" was called to verify the correct patient, procedure, equipment, support staff and site/side marked as required.  Patient understanding: patient states understanding of the procedure being performed  Site marked: the operative site was marked  Required items: required blood products, implants, devices, and special equipment available  Patient identity confirmed: verbally with patient    Pre-procedure details:     Sensation:  Numbness  Procedure details:     Laterality:  Left    Location:  Leg    Leg:  R lower legCast type:  Total contact      Post-procedure details:     Pain:  Unchanged    Sensation:  Numbness    Patient tolerance of procedure:  Tolerated well, no immediate complications  Comments:      I " "recommended application of the TCC EZ to facilitate healing.     After application of the primary dressing, a TCC EZ cast was applied with the patient in the supine sitting position and a rigid board utilized on the plantar foot to maintain the ankle at 90 degrees. The kit components were applied as follows: stockinette applied in a smooth non-wrinkled fashion, protective felt over the malleoli, tibial crest, toes and plantar foot to 2\" behind heel and secured in place with tape. Care was taken to have a finger's width excess beyond the longest toe to prevent toe impingement. Following this, the protective white sleeve was applied with an excess distally of 3\" and leaving 2\" of excess stockinette proximally. Finally, the cast sock immersed in warm water for 10 seconds and was applied distal to proximal with the foot at a 90 degree angle to the leg.  Care was taken to leave 3\" excess beyond the toes that was folded over and smoothed on the dorsal forefoot.  The cast was smoothed and held on the board in at 90 degrees to leg for 5 minutes. The patient was than sat upright and told to gently rest cast on floor with ankle at 90 degrees for an additional 15 minutes. After complete hardening, the walker boot was applied.      Strict instructions to keep the cast clean,dry and intact were given and also instructions not to introduce powders or foreign objects into the cast was given. Instructions were given to call if any cast breakdown, or any irritation, odor, drainage or pain was noted within the cast.      I explained that good wound care and compliance are necessary to allow healing and to prevent toe loss or limb loss.      No guarantees of wound healing were given and I explained clearly that there is some risk with the use of TCC such as cast \"rub\", cast ulcers, or hidden infection but that the benefits of the TCC far outweight the risks if patient compliance is in place.            Wound Instructions    Orders " Placed This Encounter   Procedures    Wound cleansing and dressings Diabetic Ulcer Left Plantar     LEFT FOOT WOUND:    Total Contact Cast Instructions: Do not get cast wet. Contact wound center if there is a foul odor or becomes uncomfortable due to feeling tight or swelling. Do not use objects down inside of cast to scratch. Do not walk on cast without walking boot.     Standing Status:   Future     Standing Expiration Date:   10/18/2024    Wound Procedure Treatment Diabetic Ulcer Left Plantar     This order was created via procedure documentation    Cast Application     This order was created via procedure documentation         SUBJECTIVE:    Chief complaint  Left foot wound    Consult-Stewart is a pleasant 79-year-old male with a past medical history significant for type 2 diabetes, peripheral arterial disease, Charcot neuroarthropathy.  He presents today with a wound to the bottom of his left foot.  He does not recall how this wound started and states that it is not overall painful.  He states that he first noticed it about 1 month ago after a long day of walking.  In regard to his foot deformity, he states that he first noticed his foot becoming misshapen approximately 1 year ago.  He states that at the time the foot was red, hot, and swollen.  He denies any systemic signs of infection over the past week.    10/11/2024: Stewart is doing very well today, he denies any complications since his previous visit.  He denies any systemic signs of infection over the past week.  He states that he tolerated his initial total contact cast well.        The following portions of the patient's history were reviewed and updated as appropriate:   There is no problem list on file for this patient.    Past Medical History:   Diagnosis Date    Arthritis     Chronic kidney disease     Diabetes mellitus (HCC)     History of wound infection ?2013    RIGHT LOWER LEG. WAS + FOR STAPH INFECTION AT THAT TIME    Hypertension     Shoulder  abrasion     right side after a fall in Feb 2018     Past Surgical History:   Procedure Laterality Date    CHOLECYSTECTOMY      COLONOSCOPY      JOINT REPLACEMENT      KNEE ARTHROPLASTY Left 2014    GA EXC B9 LESION MRGN XCP SK TG S/N/H/F/G 1.1-2.0CM Left 7/30/2018    Procedure: EXCISIONAL BIOPSY BENIGN NEOPLASM OF SKIN EXTREMITY;  Surgeon: Julio Cesar Trejo Jr., DPM;  Location: [facility];  Service: Podiatry    STEROID INJECTION SHOULDER Right     8 CERIVAL SPINE INJECTIONS    TONSILLECTOMY       Social History     Socioeconomic History    Marital status: /Civil Union     Spouse name: None    Number of children: None    Years of education: None    Highest education level: None   Occupational History    None   Tobacco Use    Smoking status: Every Day     Types: Cigars    Smokeless tobacco: Never    Tobacco comments:     3-5 cigars a day   Substance and Sexual Activity    Alcohol use: Yes     Alcohol/week: 5.0 standard drinks of alcohol     Types: 5 Cans of beer per week     Comment: DAY,SOMETIMES MORE PER PATIENT    Drug use: No    Sexual activity: None   Other Topics Concern    None   Social History Narrative    None     Social Determinants of Health     Financial Resource Strain: Not on file   Food Insecurity: Not on file   Transportation Needs: Not on file   Physical Activity: Not on file   Stress: Not on file   Social Connections: Not on file   Intimate Partner Violence: Not on file   Housing Stability: Not on file        Current Outpatient Medications:     allopurinol (ZYLOPRIM) 100 mg tablet, Take 100 mg by mouth 2 (two) times a day, Disp: , Rfl:     aspirin (ECOTRIN LOW STRENGTH) 81 mg EC tablet, Take 81 mg by mouth daily, Disp: , Rfl:     calcitriol (ROCALTROL) 0.25 mcg capsule, , Disp: , Rfl:     dapagliflozin (Farxiga) 5 MG TABS, Take by mouth daily, Disp: , Rfl:     eplerenone (INSPRA) 50 MG tablet, Take 50 mg by mouth 2 (two) times a day Takes 2 tabs bid, Disp: , Rfl:     glipiZIDE (GLUCOTROL) 5 mg  tablet, Take 5 mg by mouth every morning, Disp: , Rfl:     insulin lispro protamine-insulin lispro (HumaLOG 75/25) 100 units/mL, Inject under the skin 2 (two) times a day before meals 52units a.m. And 50 units hs, Disp: , Rfl:     Klor-Con M20 20 MEQ tablet, , Disp: , Rfl:     losartan (COZAAR) 50 mg tablet, , Disp: , Rfl:     metoprolol succinate (TOPROL-XL) 100 mg 24 hr tablet, , Disp: , Rfl:     omega-3-acid ethyl esters (LOVAZA) 1 g capsule, , Disp: , Rfl:     Potassium Chloride (KCL-20 PO), Take 10 mEq by mouth daily at bedtime (Patient not taking: Reported on 6/27/2023), Disp: , Rfl:     rosuvastatin (CRESTOR) 5 mg tablet, , Disp: , Rfl:     torsemide (DEMADEX) 20 mg tablet, Take 20 mg by mouth 2 (two) times a day 1 tablet in AM and 1/2 tablet pm, Disp: , Rfl:   No family history on file.   Review of Systems   Constitutional:  Negative for appetite change, chills, diaphoresis, fatigue and fever.   HENT: Negative.     Gastrointestinal: Negative.    Skin:  Positive for color change and wound.       Allergies  Ibuprofen    OBJECTIVE:  /87   Pulse 73   Temp (!) 97.3 °F (36.3 °C)   Resp 16     Physical Exam  Constitutional:       Appearance: Normal appearance. He is not ill-appearing or diaphoretic.   HENT:      Head: Normocephalic and atraumatic.   Eyes:      General:         Right eye: No discharge.         Left eye: No discharge.   Pulmonary:      Effort: Pulmonary effort is normal. No respiratory distress.   Musculoskeletal:      Comments: Left foot deformity noted with hallux abductovalgus, pes planus/slight rocker-bottom deformity noted with plantar medial prominence at the level of the midfoot secondary to Charcot neuroarthropathy   Skin:     Capillary Refill: Capillary refill takes less than 2 seconds.      Comments: See wound assessment below   Neurological:      Mental Status: He is alert.      Sensory: Sensory deficit (7/10 locations felt with monofilament of the left foot) present.    Psychiatric:         Mood and Affect: Mood normal.           Wound 09/25/24 Diabetic Ulcer Plantar Left (Active)   Enter Spence score: Spence Grade 2: Deep ulcer extended to ligament, tendon, joint capsule, bone, or deep fascia without abscess or osteomyelitis (OM) 10/11/24 1344   Wound Image   10/11/24 1406   Wound Description Pink;White 10/11/24 1344   Cindy-wound Assessment Callus 10/11/24 1344   Wound Length (cm) 0.4 cm 10/11/24 1344   Wound Width (cm) 1 cm 10/11/24 1344   Wound Depth (cm) 0.2 cm 10/11/24 1344   Wound Surface Area (cm^2) 0.4 cm^2 10/11/24 1344   Wound Volume (cm^3) 0.08 cm^3 10/11/24 1344   Calculated Wound Volume (cm^3) 0.08 cm^3 10/11/24 1344   Change in Wound Size % 50 10/11/24 1344   Drainage Amount Small 10/11/24 1344   Drainage Description Serosanguineous 10/11/24 1344   Non-staged Wound Description Full thickness 10/11/24 1344   Dressing Status Intact 10/11/24 1344           Wound 09/25/24 Diabetic Ulcer Plantar Left (Active)   Enter Spence score: Spence Grade 2: Deep ulcer extended to ligament, tendon, joint capsule, bone, or deep fascia without abscess or osteomyelitis (OM) 10/11/24 1344   Wound Image   10/11/24 1406   Wound Description Pink;White 10/11/24 1344   Cindy-wound Assessment Callus 10/11/24 1344   Wound Length (cm) 0.4 cm 10/11/24 1344   Wound Width (cm) 1 cm 10/11/24 1344   Wound Depth (cm) 0.2 cm 10/11/24 1344   Wound Surface Area (cm^2) 0.4 cm^2 10/11/24 1344   Wound Volume (cm^3) 0.08 cm^3 10/11/24 1344   Calculated Wound Volume (cm^3) 0.08 cm^3 10/11/24 1344   Change in Wound Size % 50 10/11/24 1344   Drainage Amount Small 10/11/24 1344   Drainage Description Serosanguineous 10/11/24 1344   Non-staged Wound Description Full thickness 10/11/24 1344   Dressing Status Intact 10/11/24 1344                         Diagnosis:  1. Diabetic ulcer of left midfoot associated with type 2 diabetes mellitus, with fat layer exposed (HCC)  -     lidocaine (LMX) 4 % cream  -     Wound  cleansing and dressings Diabetic Ulcer Left Plantar; Future  2. Charcot arthropathy of midfoot  3. Type 2 diabetes mellitus with foot ulcer, without long-term current use of insulin ([facility])      Diagnosis ICD-10-CM Associated Orders   1. Diabetic ulcer of left midfoot associated with type 2 diabetes mellitus, with fat layer exposed ([facility])  E11.621 lidocaine (LMX) 4 % cream    L97.422 Wound cleansing and dressings Diabetic Ulcer Left Plantar      2. Charcot arthropathy of midfoot  M14.679       3. Type 2 diabetes mellitus with foot ulcer, without long-term current use of insulin ([facility])  E11.621     L97.509            ASSESSMENT:    1) left plantar medial foot diabetic ulceration to the level of subcutaneous tissue, Spence 2, 75% smaller over the past 3-weeks  2) type 2 diabetes with peripheral neuropathy  3) Charcot neuroarthropathy

## 2024-10-18 ENCOUNTER — OFFICE VISIT (OUTPATIENT)
Dept: WOUND CARE | Facility: HOSPITAL | Age: 79
End: 2024-10-18
Payer: COMMERCIAL

## 2024-10-18 VITALS
SYSTOLIC BLOOD PRESSURE: 156 MMHG | HEART RATE: 75 BPM | DIASTOLIC BLOOD PRESSURE: 76 MMHG | TEMPERATURE: 97.2 F | RESPIRATION RATE: 15 BRPM

## 2024-10-18 DIAGNOSIS — M14.679 CHARCOT ARTHROPATHY OF MIDFOOT: ICD-10-CM

## 2024-10-18 DIAGNOSIS — E11.621 TYPE 2 DIABETES MELLITUS WITH FOOT ULCER, WITHOUT LONG-TERM CURRENT USE OF INSULIN (HCC): ICD-10-CM

## 2024-10-18 DIAGNOSIS — L97.509 TYPE 2 DIABETES MELLITUS WITH FOOT ULCER, WITHOUT LONG-TERM CURRENT USE OF INSULIN (HCC): ICD-10-CM

## 2024-10-18 DIAGNOSIS — E11.621 DIABETIC ULCER OF LEFT MIDFOOT ASSOCIATED WITH TYPE 2 DIABETES MELLITUS, WITH FAT LAYER EXPOSED (HCC): Primary | ICD-10-CM

## 2024-10-18 DIAGNOSIS — R09.89 WEAK ARTERIAL PULSE: ICD-10-CM

## 2024-10-18 DIAGNOSIS — L97.422 DIABETIC ULCER OF LEFT MIDFOOT ASSOCIATED WITH TYPE 2 DIABETES MELLITUS, WITH FAT LAYER EXPOSED (HCC): Primary | ICD-10-CM

## 2024-10-18 PROCEDURE — 29445 APPL RIGID TOT CNTC LEG CAST: CPT | Performed by: STUDENT IN AN ORGANIZED HEALTH CARE EDUCATION/TRAINING PROGRAM

## 2024-10-18 RX ORDER — LIDOCAINE 40 MG/G
CREAM TOPICAL ONCE
Status: COMPLETED | OUTPATIENT
Start: 2024-10-18 | End: 2024-10-18

## 2024-10-18 RX ADMIN — LIDOCAINE: 40 CREAM TOPICAL at 15:10

## 2024-10-18 NOTE — PROGRESS NOTES
Wound Procedure Treatment Diabetic Ulcer Left Plantar    Performed by: Nohemy Gonzalez RN  Authorized by: Ollie White DPM    Associated wounds:   Wound 09/25/24 Diabetic Ulcer Plantar Left  Wound cleansed with:  NSS and Soap and water  Applied to periwound:  Moisture lotion and Skin prep  Applied primary dressing:  Non adherent contact layer  Applied secondary dressing:  ABD  Offloading device appllied:  Felt padding 1/4 inch  Comments:  Xeroform applied to wound. Mepilex up to pad bony areas.

## 2024-10-18 NOTE — PATIENT INSTRUCTIONS
Orders Placed This Encounter   Procedures    Wound cleansing and dressings Diabetic Ulcer Left Plantar     LEFT FOOT WOUND:     Total Contact Cast Instructions: Do not get cast wet. Contact wound center if there is a foul odor or becomes uncomfortable due to feeling tight or swelling. Do not use objects down inside of cast to scratch. Do not walk on cast without walking boot.     Standing Status:   Future     Standing Expiration Date:   10/25/2024

## 2024-10-19 NOTE — PROGRESS NOTES
Patient ID: Stewart Flores is a 79 y.o. male Date of Birth 1945     My role is Foot, Ankle and Wound Specialist    PLAN:    -Educated patient on their condition.   -The patient's wound is not currently infected. We discussed the importance of recognizing systemic and local signs of infection and going directly to the emergency department should any of these occur  -Debridement was performed as below in order to decrease the risk of infection and promote healing:   -Dressings: total contact cast  -Discussed proper care of dressings, patient is not to get them wet at all. If the dressings do get wet, they must be removed and redressed.  -return to wound care in 1 week for cast change  -Patient and wife verbalize understanding of plan     Patient optimization:  -Vascular:              Arterial -follow-up new lower extremity arterial duplex studies              Venous -no current evidence of lower extremity venous disease              Lymphatic -no evidence of lower extremity lymphatic disease     -MSK:              Pressure reduction -total contact casting              Deformity and possible correction -noted Charcot neuroarthropathy rocker-bottom foot deformity with prominence to the plantar medial aspect.                Inserts/DME -patient will require custom molded diabetic sneakers once wound healing is achieved     -Neuro:              Neuropathy - We discussed checking feet daily for additional cuts, bruises, or wounds. We also discussed refraining from walking barefoot and wearing white socks in order to notice drainage     -Nutritional:              Dietary supplementation - Recommend high protein diet with Ricardo supplementation until wound is fully healed              Smoking cessation -patient does use nicotine products              Glycemic control -most recent hemoglobin A1c from 9/16/2024 8.4%     -Imaging/diagnositics:              X-ray - X-ray of left foot from 9/25/24 reviewed: Increased  "soft-tissue density and volume noted at the midfoot with joint effusion noted at the tarsometatarsal joint. Demineralization/sclerosis noted at the tarsometatarsal joint. Cindy-articular erosions noted at the tarsometatarsal joint. I note the foot in a general rocker-bottom position with collapse at the naviculocuneiform and tarsometatarsal joint. Talar-first metatarsal angle on lateral view -27. Calcaneus-cuboid angle on lateral view 1.2mm.               MRI - we will consider MRI in the future should the patient's wound worsen              Biopsy -no indication for biopsy at this time, should no healing be achieved within the next 3 to 4 months we will consider biopsy     -Infection management:              Wound culture -unable to obtain meaningful deep wound culture at this time              Antibiotic -no indication for antibiotic therapy at this time    Cast Application    Date/Time: 10/18/2024 2:45 PM    Performed by: Ollie White DPM  Authorized by: Ollie White DPM  Universal Protocol:  procedure performed by consultantConsent: Verbal consent obtained.  Risks and benefits: risks, benefits and alternatives were discussed  Consent given by: patient  Time out: Immediately prior to procedure a \"time out\" was called to verify the correct patient, procedure, equipment, support staff and site/side marked as required.  Patient understanding: patient states understanding of the procedure being performed  Site marked: the operative site was marked  Required items: required blood products, implants, devices, and special equipment available  Patient identity confirmed: verbally with patient    Pre-procedure details:     Sensation:  Numbness  Procedure details:     Laterality:  Left    Location:  Leg    Leg:  R lower legCast type:  Total contact      Post-procedure details:     Pain:  Unchanged    Sensation:  Numbness    Patient tolerance of procedure:  Tolerated well, no immediate complications  Comments:      I " "recommended application of the TCC EZ to facilitate healing.     After application of the primary dressing, a TCC EZ cast was applied with the patient in the supine sitting position and a rigid board utilized on the plantar foot to maintain the ankle at 90 degrees. The kit components were applied as follows: stockinette applied in a smooth non-wrinkled fashion, protective felt over the malleoli, tibial crest, toes and plantar foot to 2\" behind heel and secured in place with tape. Care was taken to have a finger's width excess beyond the longest toe to prevent toe impingement. Following this, the protective white sleeve was applied with an excess distally of 3\" and leaving 2\" of excess stockinette proximally. Finally, the cast sock immersed in warm water for 10 seconds and was applied distal to proximal with the foot at a 90 degree angle to the leg.  Care was taken to leave 3\" excess beyond the toes that was folded over and smoothed on the dorsal forefoot.  The cast was smoothed and held on the board in at 90 degrees to leg for 5 minutes. The patient was than sat upright and told to gently rest cast on floor with ankle at 90 degrees for an additional 15 minutes. After complete hardening, the walker boot was applied.      Strict instructions to keep the cast clean,dry and intact were given and also instructions not to introduce powders or foreign objects into the cast was given. Instructions were given to call if any cast breakdown, or any irritation, odor, drainage or pain was noted within the cast.      I explained that good wound care and compliance are necessary to allow healing and to prevent toe loss or limb loss.      No guarantees of wound healing were given and I explained clearly that there is some risk with the use of TCC such as cast \"rub\", cast ulcers, or hidden infection but that the benefits of the TCC far outweight the risks if patient compliance is in place.            Wound Instructions    Orders " Placed This Encounter   Procedures    Wound cleansing and dressings Diabetic Ulcer Left Plantar     LEFT FOOT WOUND:     Total Contact Cast Instructions: Do not get cast wet. Contact wound center if there is a foul odor or becomes uncomfortable due to feeling tight or swelling. Do not use objects down inside of cast to scratch. Do not walk on cast without walking boot.     Standing Status:   Future     Standing Expiration Date:   10/25/2024    Wound Procedure Treatment Diabetic Ulcer Left Plantar     This order was created via procedure documentation         SUBJECTIVE:    Chief complaint  Left foot wound    Consult-Stewart is a pleasant 79-year-old male with a past medical history significant for type 2 diabetes, peripheral arterial disease, Charcot neuroarthropathy.  He presents today with a wound to the bottom of his left foot.  He does not recall how this wound started and states that it is not overall painful.  He states that he first noticed it about 1 month ago after a long day of walking.  In regard to his foot deformity, he states that he first noticed his foot becoming misshapen approximately 1 year ago.  He states that at the time the foot was red, hot, and swollen.  He denies any systemic signs of infection over the past week.    10/18/2024: Stewart is doing very well today, he denies any complications since his previous visit.  He denies any systemic signs of infection over the past week.  He states that he tolerated his initial total contact cast well.        The following portions of the patient's history were reviewed and updated as appropriate:   There is no problem list on file for this patient.    Past Medical History:   Diagnosis Date    Arthritis     Chronic kidney disease     Diabetes mellitus (HCC)     History of wound infection ?2013    RIGHT LOWER LEG. WAS + FOR STAPH INFECTION AT THAT TIME    Hypertension     Shoulder abrasion     right side after a fall in Feb 2018     Past Surgical History:    Procedure Laterality Date    CHOLECYSTECTOMY      COLONOSCOPY      JOINT REPLACEMENT      KNEE ARTHROPLASTY Left 2014    CT EXC B9 LESION MRGN XCP SK TG S/N/H/F/G 1.1-2.0CM Left 7/30/2018    Procedure: EXCISIONAL BIOPSY BENIGN NEOPLASM OF SKIN EXTREMITY;  Surgeon: Julio Cesar Trejo Jr., DPM;  Location: [facility];  Service: Podiatry    STEROID INJECTION SHOULDER Right     8 CERIVAL SPINE INJECTIONS    TONSILLECTOMY       Social History     Socioeconomic History    Marital status: /Civil Union     Spouse name: None    Number of children: None    Years of education: None    Highest education level: None   Occupational History    None   Tobacco Use    Smoking status: Every Day     Types: Cigars    Smokeless tobacco: Never    Tobacco comments:     3-5 cigars a day   Substance and Sexual Activity    Alcohol use: Yes     Alcohol/week: 5.0 standard drinks of alcohol     Types: 5 Cans of beer per week     Comment: DAY,SOMETIMES MORE PER PATIENT    Drug use: No    Sexual activity: None   Other Topics Concern    None   Social History Narrative    None     Social Determinants of Health     Financial Resource Strain: Not on file   Food Insecurity: Not on file   Transportation Needs: Not on file   Physical Activity: Not on file   Stress: Not on file   Social Connections: Not on file   Intimate Partner Violence: Not on file   Housing Stability: Not on file        Current Outpatient Medications:     allopurinol (ZYLOPRIM) 100 mg tablet, Take 100 mg by mouth 2 (two) times a day, Disp: , Rfl:     aspirin (ECOTRIN LOW STRENGTH) 81 mg EC tablet, Take 81 mg by mouth daily, Disp: , Rfl:     calcitriol (ROCALTROL) 0.25 mcg capsule, , Disp: , Rfl:     dapagliflozin (Farxiga) 5 MG TABS, Take by mouth daily, Disp: , Rfl:     eplerenone (INSPRA) 50 MG tablet, Take 50 mg by mouth 2 (two) times a day Takes 2 tabs bid, Disp: , Rfl:     glipiZIDE (GLUCOTROL) 5 mg tablet, Take 5 mg by mouth every morning, Disp: , Rfl:     insulin lispro  protamine-insulin lispro (HumaLOG 75/25) 100 units/mL, Inject under the skin 2 (two) times a day before meals 56units a.m. And 54 units hs, Disp: , Rfl:     Klor-Con M20 20 MEQ tablet, , Disp: , Rfl:     losartan (COZAAR) 50 mg tablet, , Disp: , Rfl:     metoprolol succinate (TOPROL-XL) 100 mg 24 hr tablet, , Disp: , Rfl:     omega-3-acid ethyl esters (LOVAZA) 1 g capsule, , Disp: , Rfl:     Potassium Chloride (KCL-20 PO), Take 10 mEq by mouth daily at bedtime (Patient not taking: Reported on 6/27/2023), Disp: , Rfl:     rosuvastatin (CRESTOR) 5 mg tablet, , Disp: , Rfl:     torsemide (DEMADEX) 20 mg tablet, Take 20 mg by mouth 2 (two) times a day 1 tablet in AM and 1/2 tablet pm, Disp: , Rfl:   No current facility-administered medications for this visit.  No family history on file.   Review of Systems   Constitutional:  Negative for appetite change, chills, diaphoresis, fatigue and fever.   HENT: Negative.     Gastrointestinal: Negative.    Skin:  Positive for color change and wound.       Allergies  Ibuprofen    OBJECTIVE:  /76   Pulse 75   Temp (!) 97.2 °F (36.2 °C)   Resp 15     Physical Exam  Constitutional:       Appearance: Normal appearance. He is not ill-appearing or diaphoretic.   HENT:      Head: Normocephalic and atraumatic.   Eyes:      General:         Right eye: No discharge.         Left eye: No discharge.   Pulmonary:      Effort: Pulmonary effort is normal. No respiratory distress.   Musculoskeletal:      Comments: Left foot deformity noted with hallux abductovalgus, pes planus/slight rocker-bottom deformity noted with plantar medial prominence at the level of the midfoot secondary to Charcot neuroarthropathy   Skin:     Capillary Refill: Capillary refill takes less than 2 seconds.      Comments: See wound assessment below   Neurological:      Mental Status: He is alert.      Sensory: Sensory deficit (7/10 locations felt with monofilament of the left foot) present.   Psychiatric:          Mood and Affect: Mood normal.           Wound 09/25/24 Diabetic Ulcer Plantar Left (Active)   Enter Spence score: Spence Grade 2: Deep ulcer extended to ligament, tendon, joint capsule, bone, or deep fascia without abscess or osteomyelitis (OM) 10/18/24 1507   Wound Image   10/18/24 1506   Wound Description Pink;White 10/18/24 1507   Cindy-wound Assessment Callus;[place] 10/18/24 1507   Wound Length (cm) 0.5 cm 10/18/24 1507   Wound Width (cm) 1 cm 10/18/24 1507   Wound Depth (cm) 0.3 cm 10/18/24 1507   Wound Surface Area (cm^2) 0.5 cm^2 10/18/24 1507   Wound Volume (cm^3) 0.15 cm^3 10/18/24 1507   Calculated Wound Volume (cm^3) 0.15 cm^3 10/18/24 1507   Change in Wound Size % 6.25 10/18/24 1507   Drainage Amount Small 10/18/24 1507   Drainage Description Serosanguineous 10/18/24 1507   Non-staged Wound Description Full thickness 10/18/24 1507   Dressing Status Intact 10/18/24 1507           Wound 09/25/24 Diabetic Ulcer Plantar Left (Active)   Enter Spence score: Spence Grade 2: Deep ulcer extended to ligament, tendon, joint capsule, bone, or deep fascia without abscess or osteomyelitis (OM) 10/18/24 1507   Wound Image   10/18/24 1506   Wound Description Pink;White 10/18/24 1507   Cindy-wound Assessment Callus;[place] 10/18/24 1507   Wound Length (cm) 0.5 cm 10/18/24 1507   Wound Width (cm) 1 cm 10/18/24 1507   Wound Depth (cm) 0.3 cm 10/18/24 1507   Wound Surface Area (cm^2) 0.5 cm^2 10/18/24 1507   Wound Volume (cm^3) 0.15 cm^3 10/18/24 1507   Calculated Wound Volume (cm^3) 0.15 cm^3 10/18/24 1507   Change in Wound Size % 6.25 10/18/24 1507   Drainage Amount Small 10/18/24 1507   Drainage Description Serosanguineous 10/18/24 1507   Non-staged Wound Description Full thickness 10/18/24 1507   Dressing Status Intact 10/18/24 1507       Diagnosis:  1. Diabetic ulcer of left midfoot associated with type 2 diabetes mellitus, with fat layer exposed (HCC)  -     lidocaine (LMX) 4 % cream  -     Wound cleansing and dressings  Diabetic Ulcer Left Plantar; Future  2. Charcot arthropathy of midfoot  -     lidocaine (LMX) 4 % cream  -     Wound cleansing and dressings Diabetic Ulcer Left Plantar; Future  3. Type 2 diabetes mellitus with foot ulcer, without long-term current use of insulin ([facility])  -     lidocaine (LMX) 4 % cream  -     Wound cleansing and dressings Diabetic Ulcer Left Plantar; Future  4. Weak arterial pulse  -     lidocaine (LMX) 4 % cream  -     Wound cleansing and dressings Diabetic Ulcer Left Plantar; Future      Diagnosis ICD-10-CM Associated Orders   1. Diabetic ulcer of left midfoot associated with type 2 diabetes mellitus, with fat layer exposed ([facility])  E11.621 lidocaine (LMX) 4 % cream    L97.422 Wound cleansing and dressings Diabetic Ulcer Left Plantar      2. Charcot arthropathy of midfoot  M14.679 lidocaine (LMX) 4 % cream     Wound cleansing and dressings Diabetic Ulcer Left Plantar      3. Type 2 diabetes mellitus with foot ulcer, without long-term current use of insulin ([facility])  E11.621 lidocaine (LMX) 4 % cream    L97.509 Wound cleansing and dressings Diabetic Ulcer Left Plantar      4. Weak arterial pulse  R09.89 lidocaine (LMX) 4 % cream     Wound cleansing and dressings Diabetic Ulcer Left Plantar           ASSESSMENT:    1) left plantar medial foot diabetic ulceration to the level of subcutaneous tissue, Spence 2, 69% smaller over the past 3-weeks  2) type 2 diabetes with peripheral neuropathy  3) Charcot neuroarthropathy

## 2024-10-25 ENCOUNTER — OFFICE VISIT (OUTPATIENT)
Dept: WOUND CARE | Facility: HOSPITAL | Age: 79
End: 2024-10-25
Payer: COMMERCIAL

## 2024-10-25 VITALS
SYSTOLIC BLOOD PRESSURE: 138 MMHG | TEMPERATURE: 97.9 F | HEART RATE: 70 BPM | DIASTOLIC BLOOD PRESSURE: 83 MMHG | RESPIRATION RATE: 16 BRPM

## 2024-10-25 DIAGNOSIS — L97.422 DIABETIC ULCER OF LEFT MIDFOOT ASSOCIATED WITH TYPE 2 DIABETES MELLITUS, WITH FAT LAYER EXPOSED (HCC): Primary | ICD-10-CM

## 2024-10-25 DIAGNOSIS — E11.621 TYPE 2 DIABETES MELLITUS WITH FOOT ULCER, WITHOUT LONG-TERM CURRENT USE OF INSULIN (HCC): ICD-10-CM

## 2024-10-25 DIAGNOSIS — L97.509 TYPE 2 DIABETES MELLITUS WITH FOOT ULCER, WITHOUT LONG-TERM CURRENT USE OF INSULIN (HCC): ICD-10-CM

## 2024-10-25 DIAGNOSIS — M14.679 CHARCOT ARTHROPATHY OF MIDFOOT: ICD-10-CM

## 2024-10-25 DIAGNOSIS — E11.621 DIABETIC ULCER OF LEFT MIDFOOT ASSOCIATED WITH TYPE 2 DIABETES MELLITUS, WITH FAT LAYER EXPOSED (HCC): Primary | ICD-10-CM

## 2024-10-25 PROCEDURE — 29445 APPL RIGID TOT CNTC LEG CAST: CPT | Performed by: STUDENT IN AN ORGANIZED HEALTH CARE EDUCATION/TRAINING PROGRAM

## 2024-10-25 RX ORDER — LIDOCAINE 40 MG/G
CREAM TOPICAL ONCE
Status: COMPLETED | OUTPATIENT
Start: 2024-10-25 | End: 2024-10-25

## 2024-10-25 RX ADMIN — LIDOCAINE: 40 CREAM TOPICAL at 15:20

## 2024-10-25 NOTE — PATIENT INSTRUCTIONS
Orders Placed This Encounter   Procedures    Wound cleansing and dressings Diabetic Ulcer Left Plantar     LEFT FOOT WOUND:     Total Contact Cast Instructions: Do not get cast wet. Contact wound center if there is a foul odor or becomes uncomfortable due to feeling tight or swelling. Do not use objects down inside of cast to scratch. Do not walk on cast without walking boot.     Standing Status:   Future     Standing Expiration Date:   11/1/2024    Wound Procedure Treatment Diabetic Ulcer Left Plantar     This order was created via procedure documentation

## 2024-10-25 NOTE — PROGRESS NOTES
Wound Procedure Treatment Diabetic Ulcer Left Plantar    Performed by: Nohemy Gonzalez RN  Authorized by: Ollie White DPM    Associated wounds:   Wound 09/25/24 Diabetic Ulcer Plantar Left  Wound cleansed with:  NSS and Soap and water  Applied to periwound:  Moisture lotion and Skin prep  Applied primary dressing:  Non adherent contact layer  Applied secondary dressing:  ABD  Dressing secured with:  Tape  Offloading device appllied:  Felt padding 1/4 inch and TCC  Comments:  Xeroform to wound.  Double layer felt padding, L shaped, periwound

## 2024-10-25 NOTE — PROGRESS NOTES
Patient ID: Stewart Flores is a 79 y.o. male Date of Birth 1945     My role is Foot, Ankle and Wound Specialist    PLAN:    -Educated patient on their condition.   -The patient's wound is not currently infected. We discussed the importance of recognizing systemic and local signs of infection and going directly to the emergency department should any of these occur  -Debridement was performed as below in order to decrease the risk of infection and promote healing:   -Dressings: total contact cast  -Discussed proper care of dressings, patient is not to get them wet at all. If the dressings do get wet, they must be removed and redressed.  -return to wound care in 1 week for cast change  -Patient and wife verbalize understanding of plan     Patient optimization:  -Vascular:              Arterial -follow-up new lower extremity arterial duplex studies              Venous -no current evidence of lower extremity venous disease              Lymphatic -no evidence of lower extremity lymphatic disease     -MSK:              Pressure reduction -total contact casting              Deformity and possible correction -noted Charcot neuroarthropathy rocker-bottom foot deformity with prominence to the plantar medial aspect.                Inserts/DME -patient will require custom molded diabetic sneakers once wound healing is achieved     -Neuro:              Neuropathy - We discussed checking feet daily for additional cuts, bruises, or wounds. We also discussed refraining from walking barefoot and wearing white socks in order to notice drainage     -Nutritional:              Dietary supplementation - Recommend high protein diet with Ricardo supplementation until wound is fully healed              Smoking cessation -patient does use nicotine products              Glycemic control -most recent hemoglobin A1c from 9/16/2024 8.4%     -Imaging/diagnositics:              X-ray - X-ray of left foot from 9/25/24 reviewed: Increased  "soft-tissue density and volume noted at the midfoot with joint effusion noted at the tarsometatarsal joint. Demineralization/sclerosis noted at the tarsometatarsal joint. Cindy-articular erosions noted at the tarsometatarsal joint. I note the foot in a general rocker-bottom position with collapse at the naviculocuneiform and tarsometatarsal joint. Talar-first metatarsal angle on lateral view -27. Calcaneus-cuboid angle on lateral view 1.2mm.               MRI - we will consider MRI in the future should the patient's wound worsen              Biopsy -no indication for biopsy at this time, should no healing be achieved within the next 3 to 4 months we will consider biopsy     -Infection management:              Wound culture -unable to obtain meaningful deep wound culture at this time              Antibiotic -no indication for antibiotic therapy at this time    Cast Application    Date/Time: 10/25/2024 3:00 PM    Performed by: Ollie White DPM  Authorized by: Ollie White DPM  Universal Protocol:  procedure performed by consultantConsent: Verbal consent obtained.  Risks and benefits: risks, benefits and alternatives were discussed  Consent given by: patient  Time out: Immediately prior to procedure a \"time out\" was called to verify the correct patient, procedure, equipment, support staff and site/side marked as required.  Patient understanding: patient states understanding of the procedure being performed  Site marked: the operative site was marked  Required items: required blood products, implants, devices, and special equipment available  Patient identity confirmed: verbally with patient    Pre-procedure details:     Sensation:  Numbness  Procedure details:     Laterality:  Right    Location:  Leg    Leg:  L lower legCast type:  Total contact      Post-procedure details:     Pain:  Unchanged    Sensation:  Numbness    Patient tolerance of procedure:  Tolerated well, no immediate complications  Comments:      I " "recommended application of the TCC EZ to facilitate healing.     After application of the primary dressing, a TCC EZ cast was applied with the patient in the supine sitting position and a rigid board utilized on the plantar foot to maintain the ankle at 90 degrees. The kit components were applied as follows: stockinette applied in a smooth non-wrinkled fashion, protective felt over the malleoli, tibial crest, toes and plantar foot to 2\" behind heel and secured in place with tape. Care was taken to have a finger's width excess beyond the longest toe to prevent toe impingement. Following this, the protective white sleeve was applied with an excess distally of 3\" and leaving 2\" of excess stockinette proximally. Finally, the cast sock immersed in warm water for 10 seconds and was applied distal to proximal with the foot at a 90 degree angle to the leg.  Care was taken to leave 3\" excess beyond the toes that was folded over and smoothed on the dorsal forefoot.  The cast was smoothed and held on the board in at 90 degrees to leg for 5 minutes. The patient was than sat upright and told to gently rest cast on floor with ankle at 90 degrees for an additional 15 minutes. After complete hardening, the walker boot was applied.      Strict instructions to keep the cast clean,dry and intact were given and also instructions not to introduce powders or foreign objects into the cast was given. Instructions were given to call if any cast breakdown, or any irritation, odor, drainage or pain was noted within the cast.      I explained that good wound care and compliance are necessary to allow healing and to prevent toe loss or limb loss.      No guarantees of wound healing were given and I explained clearly that there is some risk with the use of TCC such as cast \"rub\", cast ulcers, or hidden infection but that the benefits of the TCC far outweight the risks if patient compliance is in place.            Wound Instructions    Orders " Placed This Encounter   Procedures    Wound cleansing and dressings Diabetic Ulcer Left Plantar     LEFT FOOT WOUND:     Total Contact Cast Instructions: Do not get cast wet. Contact wound center if there is a foul odor or becomes uncomfortable due to feeling tight or swelling. Do not use objects down inside of cast to scratch. Do not walk on cast without walking boot.     Standing Status:   Future     Standing Expiration Date:   11/1/2024    Wound Procedure Treatment Diabetic Ulcer Left Plantar     This order was created via procedure documentation    Cast Application     This order was created via procedure documentation         SUBJECTIVE:    Chief complaint  Left foot ulcer    Consult-Stewart is a pleasant 79-year-old male with a past medical history significant for type 2 diabetes, peripheral arterial disease, Charcot neuroarthropathy.  He presents today with a wound to the bottom of his left foot.  He does not recall how this wound started and states that it is not overall painful.  He states that he first noticed it about 1 month ago after a long day of walking.  In regard to his foot deformity, he states that he first noticed his foot becoming misshapen approximately 1 year ago.  He states that at the time the foot was red, hot, and swollen.  He denies any systemic signs of infection over the past week.    10/25/2024: Stewart is doing very well today, he denies any complications since his previous visit.  He denies any systemic signs of infection over the past week.  He states that he tolerated his initial total contact cast well.        The following portions of the patient's history were reviewed and updated as appropriate:   There is no problem list on file for this patient.    Past Medical History:   Diagnosis Date    Arthritis     Chronic kidney disease     Diabetes mellitus (HCC)     History of wound infection ?2013    RIGHT LOWER LEG. WAS + FOR STAPH INFECTION AT THAT TIME    Hypertension     Shoulder  abrasion     right side after a fall in Feb 2018     Past Surgical History:   Procedure Laterality Date    CHOLECYSTECTOMY      COLONOSCOPY      JOINT REPLACEMENT      KNEE ARTHROPLASTY Left 2014    AR EXC B9 LESION MRGN XCP SK TG S/N/H/F/G 1.1-2.0CM Left 7/30/2018    Procedure: EXCISIONAL BIOPSY BENIGN NEOPLASM OF SKIN EXTREMITY;  Surgeon: Julio Cesar Trejo Jr., DPM;  Location: [facility];  Service: Podiatry    STEROID INJECTION SHOULDER Right     8 CERIVAL SPINE INJECTIONS    TONSILLECTOMY       Social History     Socioeconomic History    Marital status: /Civil Union     Spouse name: None    Number of children: None    Years of education: None    Highest education level: None   Occupational History    None   Tobacco Use    Smoking status: Every Day     Types: Cigars    Smokeless tobacco: Never    Tobacco comments:     3-5 cigars a day   Substance and Sexual Activity    Alcohol use: Yes     Alcohol/week: 5.0 standard drinks of alcohol     Types: 5 Cans of beer per week     Comment: DAY,SOMETIMES MORE PER PATIENT    Drug use: No    Sexual activity: None   Other Topics Concern    None   Social History Narrative    None     Social Determinants of Health     Financial Resource Strain: Not on file   Food Insecurity: Not on file   Transportation Needs: Not on file   Physical Activity: Not on file   Stress: Not on file   Social Connections: Not on file   Intimate Partner Violence: Not on file   Housing Stability: Not on file        Current Outpatient Medications:     allopurinol (ZYLOPRIM) 100 mg tablet, Take 100 mg by mouth 2 (two) times a day, Disp: , Rfl:     aspirin (ECOTRIN LOW STRENGTH) 81 mg EC tablet, Take 81 mg by mouth daily, Disp: , Rfl:     calcitriol (ROCALTROL) 0.25 mcg capsule, , Disp: , Rfl:     dapagliflozin (Farxiga) 5 MG TABS, Take by mouth daily, Disp: , Rfl:     eplerenone (INSPRA) 50 MG tablet, Take 50 mg by mouth 2 (two) times a day Takes 2 tabs bid, Disp: , Rfl:     glipiZIDE (GLUCOTROL) 5 mg  tablet, Take 5 mg by mouth every morning, Disp: , Rfl:     insulin lispro protamine-insulin lispro (HumaLOG 75/25) 100 units/mL, Inject under the skin 2 (two) times a day before meals 56units a.m. And 54 units hs, Disp: , Rfl:     Klor-Con M20 20 MEQ tablet, , Disp: , Rfl:     losartan (COZAAR) 50 mg tablet, , Disp: , Rfl:     metoprolol succinate (TOPROL-XL) 100 mg 24 hr tablet, , Disp: , Rfl:     omega-3-acid ethyl esters (LOVAZA) 1 g capsule, , Disp: , Rfl:     Potassium Chloride (KCL-20 PO), Take 10 mEq by mouth daily at bedtime (Patient not taking: Reported on 6/27/2023), Disp: , Rfl:     rosuvastatin (CRESTOR) 5 mg tablet, , Disp: , Rfl:     torsemide (DEMADEX) 20 mg tablet, Take 20 mg by mouth 2 (two) times a day 1 tablet in AM and 1/2 tablet pm, Disp: , Rfl:   No current facility-administered medications for this visit.  No family history on file.   Review of Systems   Constitutional:  Negative for appetite change, chills, diaphoresis, fatigue and fever.   HENT: Negative.     Gastrointestinal: Negative.    Skin:  Positive for color change and wound.       Allergies  Ibuprofen    OBJECTIVE:  /83   Pulse 70   Temp 97.9 °F (36.6 °C)   Resp 16     Physical Exam  Constitutional:       Appearance: Normal appearance. He is not ill-appearing or diaphoretic.   HENT:      Head: Normocephalic and atraumatic.   Eyes:      General:         Right eye: No discharge.         Left eye: No discharge.   Pulmonary:      Effort: Pulmonary effort is normal. No respiratory distress.   Musculoskeletal:      Comments: Left foot deformity noted with hallux abductovalgus, pes planus/slight rocker-bottom deformity noted with plantar medial prominence at the level of the midfoot secondary to Charcot neuroarthropathy   Skin:     Capillary Refill: Capillary refill takes less than 2 seconds.      Comments: See wound assessment below   Neurological:      Mental Status: He is alert.      Sensory: Sensory deficit (7/10 locations felt  with monofilament of the left foot) present.   Psychiatric:         Mood and Affect: Mood normal.           Wound 09/25/24 Diabetic Ulcer Plantar Left (Active)   Enter Spence score: Spence Grade 2: Deep ulcer extended to ligament, tendon, joint capsule, bone, or deep fascia without abscess or osteomyelitis (OM) 10/25/24 1517   Wound Image   10/25/24 1517   Wound Description Pink;White 10/25/24 1517   Cindy-wound Assessment Callus;Pink 10/25/24 1517   Wound Length (cm) 0.3 cm 10/25/24 1517   Wound Width (cm) 0.7 cm 10/25/24 1517   Wound Depth (cm) 0.2 cm 10/25/24 1517   Wound Surface Area (cm^2) 0.21 cm^2 10/25/24 1517   Wound Volume (cm^3) 0.042 cm^3 10/25/24 1517   Calculated Wound Volume (cm^3) 0.04 cm^3 10/25/24 1517   Change in Wound Size % 75 10/25/24 1517   Drainage Amount Small 10/25/24 1517   Drainage Description Serosanguineous 10/25/24 1517   Non-staged Wound Description Full thickness 10/25/24 1517   Dressing Status Intact 10/25/24 1517           Wound 09/25/24 Diabetic Ulcer Plantar Left (Active)   Enter Spence score: Spence Grade 2: Deep ulcer extended to ligament, tendon, joint capsule, bone, or deep fascia without abscess or osteomyelitis (OM) 10/25/24 1517   Wound Image   10/25/24 1517   Wound Description Pink;White 10/25/24 1517   Cindy-wound Assessment Callus;Pink 10/25/24 1517   Wound Length (cm) 0.3 cm 10/25/24 1517   Wound Width (cm) 0.7 cm 10/25/24 1517   Wound Depth (cm) 0.2 cm 10/25/24 1517   Wound Surface Area (cm^2) 0.21 cm^2 10/25/24 1517   Wound Volume (cm^3) 0.042 cm^3 10/25/24 1517   Calculated Wound Volume (cm^3) 0.04 cm^3 10/25/24 1517   Change in Wound Size % 75 10/25/24 1517   Drainage Amount Small 10/25/24 1517   Drainage Description Serosanguineous 10/25/24 1517   Non-staged Wound Description Full thickness 10/25/24 1517   Dressing Status Intact 10/25/24 1517                         Diagnosis:  1. Diabetic ulcer of left midfoot associated with type 2 diabetes mellitus, with fat  layer exposed (HCC)  -     lidocaine (LMX) 4 % cream  -     Wound cleansing and dressings Diabetic Ulcer Left Plantar; Future  -     Wound Procedure Treatment Diabetic Ulcer Left Plantar  2. Charcot arthropathy of midfoot  -     lidocaine (LMX) 4 % cream  -     Wound cleansing and dressings Diabetic Ulcer Left Plantar; Future  -     Wound Procedure Treatment Diabetic Ulcer Left Plantar  3. Type 2 diabetes mellitus with foot ulcer, without long-term current use of insulin (HCC)  -     lidocaine (LMX) 4 % cream  -     Wound cleansing and dressings Diabetic Ulcer Left Plantar; Future  -     Wound Procedure Treatment Diabetic Ulcer Left Plantar      Diagnosis ICD-10-CM Associated Orders   1. Diabetic ulcer of left midfoot associated with type 2 diabetes mellitus, with fat layer exposed (HCC)  E11.621 lidocaine (LMX) 4 % cream    L97.422 Wound cleansing and dressings Diabetic Ulcer Left Plantar     Wound Procedure Treatment Diabetic Ulcer Left Plantar      2. Charcot arthropathy of midfoot  M14.679 lidocaine (LMX) 4 % cream     Wound cleansing and dressings Diabetic Ulcer Left Plantar     Wound Procedure Treatment Diabetic Ulcer Left Plantar      3. Type 2 diabetes mellitus with foot ulcer, without long-term current use of insulin (HCC)  E11.621 lidocaine (LMX) 4 % cream    L97.509 Wound cleansing and dressings Diabetic Ulcer Left Plantar     Wound Procedure Treatment Diabetic Ulcer Left Plantar           ASSESSMENT:    1) left plantar medial foot diabetic ulceration to the level of subcutaneous tissue, Spence 2, 87% smaller over the past 30-days  2) type 2 diabetes with peripheral neuropathy  3) Charcot neuroarthropathy

## 2024-11-01 ENCOUNTER — OFFICE VISIT (OUTPATIENT)
Dept: WOUND CARE | Facility: HOSPITAL | Age: 79
End: 2024-11-01
Payer: COMMERCIAL

## 2024-11-01 VITALS
SYSTOLIC BLOOD PRESSURE: 177 MMHG | RESPIRATION RATE: 18 BRPM | DIASTOLIC BLOOD PRESSURE: 88 MMHG | HEART RATE: 65 BPM | TEMPERATURE: 97.6 F

## 2024-11-01 DIAGNOSIS — E11.621 TYPE 2 DIABETES MELLITUS WITH FOOT ULCER, WITHOUT LONG-TERM CURRENT USE OF INSULIN (HCC): ICD-10-CM

## 2024-11-01 DIAGNOSIS — E11.621 DIABETIC ULCER OF LEFT MIDFOOT ASSOCIATED WITH TYPE 2 DIABETES MELLITUS, WITH FAT LAYER EXPOSED (HCC): Primary | ICD-10-CM

## 2024-11-01 DIAGNOSIS — L97.509 TYPE 2 DIABETES MELLITUS WITH FOOT ULCER, WITHOUT LONG-TERM CURRENT USE OF INSULIN (HCC): ICD-10-CM

## 2024-11-01 DIAGNOSIS — L97.422 DIABETIC ULCER OF LEFT MIDFOOT ASSOCIATED WITH TYPE 2 DIABETES MELLITUS, WITH FAT LAYER EXPOSED (HCC): Primary | ICD-10-CM

## 2024-11-01 DIAGNOSIS — M14.679 CHARCOT ARTHROPATHY OF MIDFOOT: ICD-10-CM

## 2024-11-01 PROCEDURE — 29445 APPL RIGID TOT CNTC LEG CAST: CPT | Performed by: STUDENT IN AN ORGANIZED HEALTH CARE EDUCATION/TRAINING PROGRAM

## 2024-11-01 NOTE — PROGRESS NOTES
Wound Procedure Treatment Diabetic Ulcer Left Plantar    Performed by: Charlotte Shoemaker LPN  Authorized by: Ollie White DPM    Associated wounds:   Wound 09/25/24 Diabetic Ulcer Plantar Left  Wound cleansed with:  NSS  Applied primary dressing:  Other  Applied secondary dressing:  ABD  Offloading device appllied:  Foam padding and TCC  Comments:  Xeroform

## 2024-11-01 NOTE — PATIENT INSTRUCTIONS
Orders Placed This Encounter   Procedures    Wound cleansing and dressings     LEFT FOOT WOUND:     Total Contact Cast Instructions: Do not get cast wet. Contact wound center if there is a foul odor or becomes uncomfortable due to feeling tight or swelling. Do not use objects down inside of cast to scratch. Do not walk on cast without walking boot.     Standing Status:   Future     Standing Expiration Date:   11/8/2024

## 2024-11-01 NOTE — PROGRESS NOTES
Patient ID: Stewart Flores is a 79 y.o. male Date of Birth 1945     My role is Foot, Ankle and Wound Specialist    PLAN:    -Educated patient on their condition.   -The patient's wound is not currently infected. We discussed the importance of recognizing systemic and local signs of infection and going directly to the emergency department should any of these occur  -Debridement was performed as below in order to decrease the risk of infection and promote healing:   -Dressings: total contact cast  -Discussed proper care of dressings, patient is not to get them wet at all. If the dressings do get wet, they must be removed and redressed.  -return to wound care in 1 week for cast change  -Patient and wife verbalize understanding of plan     Patient optimization:  -Vascular:              Arterial -follow-up new lower extremity arterial duplex studies              Venous -no current evidence of lower extremity venous disease              Lymphatic -no evidence of lower extremity lymphatic disease     -MSK:              Pressure reduction -total contact casting              Deformity and possible correction -noted Charcot neuroarthropathy rocker-bottom foot deformity with prominence to the plantar medial aspect.                Inserts/DME -patient will require custom molded diabetic sneakers once wound healing is achieved     -Neuro:              Neuropathy - We discussed checking feet daily for additional cuts, bruises, or wounds. We also discussed refraining from walking barefoot and wearing white socks in order to notice drainage     -Nutritional:              Dietary supplementation - Recommend high protein diet with Ricardo supplementation until wound is fully healed              Smoking cessation -patient does use nicotine products              Glycemic control -most recent hemoglobin A1c from 9/16/2024 8.4%     -Imaging/diagnositics:              X-ray - X-ray of left foot from 9/25/24 reviewed: Increased  "soft-tissue density and volume noted at the midfoot with joint effusion noted at the tarsometatarsal joint. Demineralization/sclerosis noted at the tarsometatarsal joint. Cindy-articular erosions noted at the tarsometatarsal joint. I note the foot in a general rocker-bottom position with collapse at the naviculocuneiform and tarsometatarsal joint. Talar-first metatarsal angle on lateral view -27. Calcaneus-cuboid angle on lateral view 1.2mm.               MRI - we will consider MRI in the future should the patient's wound worsen              Biopsy -no indication for biopsy at this time, should no healing be achieved within the next 3 to 4 months we will consider biopsy     -Infection management:              Wound culture -unable to obtain meaningful deep wound culture at this time              Antibiotic -no indication for antibiotic therapy at this time    Cast Application    Date/Time: 11/1/2024 12:45 PM    Performed by: Ollie White DPM  Authorized by: Ollie White DPM  Universal Protocol:  procedure performed by consultantConsent: Verbal consent obtained.  Risks and benefits: risks, benefits and alternatives were discussed  Consent given by: patient  Time out: Immediately prior to procedure a \"time out\" was called to verify the correct patient, procedure, equipment, support staff and site/side marked as required.  Patient understanding: patient states understanding of the procedure being performed  Site marked: the operative site was marked  Required items: required blood products, implants, devices, and special equipment available  Patient identity confirmed: verbally with patient    Pre-procedure details:     Sensation:  Numbness  Procedure details:     Laterality:  Left    Location:  Leg    Leg:  L lower legCast type:  Total contact      Post-procedure details:     Pain:  Unchanged    Sensation:  Numbness    Patient tolerance of procedure:  Tolerated well, no immediate complications       Wound " Instructions    Orders Placed This Encounter   Procedures    Wound cleansing and dressings     LEFT FOOT WOUND:     Total Contact Cast Instructions: Do not get cast wet. Contact wound center if there is a foul odor or becomes uncomfortable due to feeling tight or swelling. Do not use objects down inside of cast to scratch. Do not walk on cast without walking boot.     Standing Status:   Future     Standing Expiration Date:   11/8/2024    Wound Procedure Treatment Diabetic Ulcer Left Plantar     This order was created via procedure documentation    Cast Application     This order was created via procedure documentation     SUBJECTIVE:    Chief complaint  Left foot wound    Consult-Stewart is a pleasant 79-year-old male with a past medical history significant for type 2 diabetes, peripheral arterial disease, Charcot neuroarthropathy.  He presents today with a wound to the bottom of his left foot.  He does not recall how this wound started and states that it is not overall painful.  He states that he first noticed it about 1 month ago after a long day of walking.  In regard to his foot deformity, he states that he first noticed his foot becoming misshapen approximately 1 year ago.  He states that at the time the foot was red, hot, and swollen.  He denies any systemic signs of infection over the past week.    11/1/2024: Stewart is doing very well today, he denies any complications since his previous visit.  He denies any systemic signs of infection over the past week.  He states that he tolerated his total contact cast well.  He does state that it felt slightly tight over the past week, but overall has no complaints.        The following portions of the patient's history were reviewed and updated as appropriate:   There is no problem list on file for this patient.    Past Medical History:   Diagnosis Date    Arthritis     Chronic kidney disease     Diabetes mellitus (HCC)     History of wound infection ?2013    RIGHT  LOWER LEG. WAS + FOR STAPH INFECTION AT THAT TIME    Hypertension     Shoulder abrasion     right side after a fall in Feb 2018     Past Surgical History:   Procedure Laterality Date    CHOLECYSTECTOMY      COLONOSCOPY      JOINT REPLACEMENT      KNEE ARTHROPLASTY Left 2014    MA EXC B9 LESION MRGN XCP SK TG S/N/H/F/G 1.1-2.0CM Left 7/30/2018    Procedure: EXCISIONAL BIOPSY BENIGN NEOPLASM OF SKIN EXTREMITY;  Surgeon: Julio Cesar Trejo Jr., DPM;  Location: [facility];  Service: Podiatry    STEROID INJECTION SHOULDER Right     8 CERIVAL SPINE INJECTIONS    TONSILLECTOMY       Social History     Socioeconomic History    Marital status: /Civil Union     Spouse name: Not on file    Number of children: Not on file    Years of education: Not on file    Highest education level: Not on file   Occupational History    Not on file   Tobacco Use    Smoking status: Every Day     Types: Cigars    Smokeless tobacco: Never    Tobacco comments:     3-5 cigars a day   Substance and Sexual Activity    Alcohol use: Yes     Alcohol/week: 5.0 standard drinks of alcohol     Types: 5 Cans of beer per week     Comment: DAY,SOMETIMES MORE PER PATIENT    Drug use: No    Sexual activity: Not on file   Other Topics Concern    Not on file   Social History Narrative    Not on file     Social Determinants of Health     Financial Resource Strain: Not on file   Food Insecurity: Not on file   Transportation Needs: Not on file   Physical Activity: Not on file   Stress: Not on file   Social Connections: Not on file   Intimate Partner Violence: Not on file   Housing Stability: Not on file        Current Outpatient Medications:     allopurinol (ZYLOPRIM) 100 mg tablet, Take 100 mg by mouth 2 (two) times a day, Disp: , Rfl:     aspirin (ECOTRIN LOW STRENGTH) 81 mg EC tablet, Take 81 mg by mouth daily, Disp: , Rfl:     calcitriol (ROCALTROL) 0.25 mcg capsule, , Disp: , Rfl:     dapagliflozin (Farxiga) 5 MG TABS, Take by mouth daily, Disp: , Rfl:      eplerenone (INSPRA) 50 MG tablet, Take 50 mg by mouth 2 (two) times a day Takes 2 tabs bid, Disp: , Rfl:     glipiZIDE (GLUCOTROL) 5 mg tablet, Take 5 mg by mouth every morning, Disp: , Rfl:     insulin lispro protamine-insulin lispro (HumaLOG 75/25) 100 units/mL, Inject under the skin 2 (two) times a day before meals 56units a.m. And 54 units hs, Disp: , Rfl:     Klor-Con M20 20 MEQ tablet, , Disp: , Rfl:     losartan (COZAAR) 50 mg tablet, , Disp: , Rfl:     metoprolol succinate (TOPROL-XL) 100 mg 24 hr tablet, , Disp: , Rfl:     omega-3-acid ethyl esters (LOVAZA) 1 g capsule, , Disp: , Rfl:     Potassium Chloride (KCL-20 PO), Take 10 mEq by mouth daily at bedtime (Patient not taking: Reported on 6/27/2023), Disp: , Rfl:     rosuvastatin (CRESTOR) 5 mg tablet, , Disp: , Rfl:     torsemide (DEMADEX) 20 mg tablet, Take 20 mg by mouth 2 (two) times a day 1 tablet in AM and 1/2 tablet pm, Disp: , Rfl:   No family history on file.   Review of Systems   Constitutional:  Negative for appetite change, chills, diaphoresis, fatigue and fever.   HENT: Negative.     Gastrointestinal: Negative.    Skin:  Positive for color change and wound.       Allergies  Ibuprofen    OBJECTIVE:  BP (!) 177/88   Pulse 65   Temp 97.6 °F (36.4 °C)   Resp 18     Physical Exam  Constitutional:       Appearance: Normal appearance. He is not ill-appearing or diaphoretic.   HENT:      Head: Normocephalic and atraumatic.   Eyes:      General:         Right eye: No discharge.         Left eye: No discharge.   Pulmonary:      Effort: Pulmonary effort is normal. No respiratory distress.   Musculoskeletal:      Comments: Left foot deformity noted with hallux abductovalgus, pes planus/slight rocker-bottom deformity noted with plantar medial prominence at the level of the midfoot secondary to Charcot neuroarthropathy   Skin:     Capillary Refill: Capillary refill takes less than 2 seconds.      Comments: See wound assessment below   Neurological:       Mental Status: He is alert.      Sensory: Sensory deficit (7/10 locations felt with monofilament of the left foot) present.   Psychiatric:         Mood and Affect: Mood normal.           Wound 09/25/24 Diabetic Ulcer Plantar Left (Active)   Enter Spence score: Spence Grade 2: Deep ulcer extended to ligament, tendon, joint capsule, bone, or deep fascia without abscess or osteomyelitis (OM) 11/01/24 1301   Wound Image   11/01/24 1315   Wound Description [place] 11/01/24 1301   Cindy-wound Assessment Callus;[place] 11/01/24 1301   Wound Length (cm) 0.3 cm 11/01/24 1301   Wound Width (cm) 0.5 cm 11/01/24 1301   Wound Depth (cm) 0.2 cm 11/01/24 1301   Wound Surface Area (cm^2) 0.15 cm^2 11/01/24 1301   Wound Volume (cm^3) 0.03 cm^3 11/01/24 1301   Calculated Wound Volume (cm^3) 0.03 cm^3 11/01/24 1301   Change in Wound Size % 81.25 11/01/24 1301   Drainage Amount Small 11/01/24 1301   Drainage Description Serosanguineous 11/01/24 1301   Non-staged Wound Description Full thickness 11/01/24 1301   Dressing Status Intact 10/25/24 1517           Wound 09/25/24 Diabetic Ulcer Plantar Left (Active)   Enter Spence score: Spence Grade 2: Deep ulcer extended to ligament, tendon, joint capsule, bone, or deep fascia without abscess or osteomyelitis (OM) 11/01/24 1301   Wound Image   11/01/24 1315   Wound Description [place] 11/01/24 1301   Cindy-wound Assessment Callus;[place] 11/01/24 1301   Wound Length (cm) 0.3 cm 11/01/24 1301   Wound Width (cm) 0.5 cm 11/01/24 1301   Wound Depth (cm) 0.2 cm 11/01/24 1301   Wound Surface Area (cm^2) 0.15 cm^2 11/01/24 1301   Wound Volume (cm^3) 0.03 cm^3 11/01/24 1301   Calculated Wound Volume (cm^3) 0.03 cm^3 11/01/24 1301   Change in Wound Size % 81.25 11/01/24 1301   Drainage Amount Small 11/01/24 1301   Drainage Description Serosanguineous 11/01/24 1301   Non-staged Wound Description Full thickness 11/01/24 1301   Dressing Status Intact 10/25/24 6374                         Diagnosis:  1. Diabetic ulcer  of left midfoot associated with type 2 diabetes mellitus, with fat layer exposed (HCC)  -     Wound cleansing and dressings; Future  -     Wound Procedure Treatment Diabetic Ulcer Left Plantar  2. Charcot arthropathy of midfoot  -     Wound cleansing and dressings; Future  -     Wound Procedure Treatment Diabetic Ulcer Left Plantar  3. Type 2 diabetes mellitus with foot ulcer, without long-term current use of insulin (HCC)  -     Wound cleansing and dressings; Future  -     Wound Procedure Treatment Diabetic Ulcer Left Plantar      Diagnosis ICD-10-CM Associated Orders   1. Diabetic ulcer of left midfoot associated with type 2 diabetes mellitus, with fat layer exposed (HCC)  E11.621 Wound cleansing and dressings    L97.422 Wound Procedure Treatment Diabetic Ulcer Left Plantar      2. Charcot arthropathy of midfoot  M14.679 Wound cleansing and dressings     Wound Procedure Treatment Diabetic Ulcer Left Plantar      3. Type 2 diabetes mellitus with foot ulcer, without long-term current use of insulin (HCC)  E11.621 Wound cleansing and dressings    L97.509 Wound Procedure Treatment Diabetic Ulcer Left Plantar           ASSESSMENT:    1) left plantar medial foot diabetic ulceration to the level of subcutaneous tissue, Spence 2, 82% smaller over the past 30-days  2) type 2 diabetes with peripheral neuropathy  3) Charcot neuroarthropathy                 Term [place] Vaginal Delivery (>/= 37 weeks)

## 2024-11-08 ENCOUNTER — OFFICE VISIT (OUTPATIENT)
Dept: WOUND CARE | Facility: HOSPITAL | Age: 79
End: 2024-11-08
Payer: COMMERCIAL

## 2024-11-08 VITALS
HEART RATE: 68 BPM | RESPIRATION RATE: 18 BRPM | SYSTOLIC BLOOD PRESSURE: 166 MMHG | TEMPERATURE: 97.4 F | DIASTOLIC BLOOD PRESSURE: 84 MMHG

## 2024-11-08 DIAGNOSIS — M14.679 CHARCOT ARTHROPATHY OF MIDFOOT: ICD-10-CM

## 2024-11-08 DIAGNOSIS — E11.621 DIABETIC ULCER OF LEFT MIDFOOT ASSOCIATED WITH TYPE 2 DIABETES MELLITUS, WITH FAT LAYER EXPOSED (HCC): Primary | ICD-10-CM

## 2024-11-08 DIAGNOSIS — E11.621 TYPE 2 DIABETES MELLITUS WITH FOOT ULCER, WITHOUT LONG-TERM CURRENT USE OF INSULIN (HCC): ICD-10-CM

## 2024-11-08 DIAGNOSIS — L97.509 TYPE 2 DIABETES MELLITUS WITH FOOT ULCER, WITHOUT LONG-TERM CURRENT USE OF INSULIN (HCC): ICD-10-CM

## 2024-11-08 DIAGNOSIS — L97.422 DIABETIC ULCER OF LEFT MIDFOOT ASSOCIATED WITH TYPE 2 DIABETES MELLITUS, WITH FAT LAYER EXPOSED (HCC): Primary | ICD-10-CM

## 2024-11-08 PROCEDURE — 11042 DBRDMT SUBQ TIS 1ST 20SQCM/<: CPT | Performed by: STUDENT IN AN ORGANIZED HEALTH CARE EDUCATION/TRAINING PROGRAM

## 2024-11-08 NOTE — PROGRESS NOTES
Wound Procedure Treatment Diabetic Ulcer Left Plantar    Performed by: Charlotte Shoemaker LPN  Authorized by: Ollie White DPM    Associated wounds:   Wound 09/25/24 Diabetic Ulcer Plantar Left  Wound cleansed with:  NSS  Applied primary dressing:  Dermagran  Applied secondary dressing:  ABD  Dressing secured with:  Kerlix and Tape

## 2024-11-08 NOTE — PATIENT INSTRUCTIONS
Orders Placed This Encounter   Procedures    Wound cleansing and dressings     Wash your hands with soap and water.  Remove old dressing, discard into plastic bag and place in trash.  Cleanse the wound with saline prior to applying a clean dressing. Do not use tissue or cotton balls. Do not scrub the wound. Pat dry using gauze.  Shower no   Apply dermagran to the foot wound.  Cover with ABD  Secure with maria esther and tape  Change dressing three times a week    Wear surgical shoe     Standing Status:   Future     Standing Expiration Date:   11/15/2024

## 2024-11-09 NOTE — PROGRESS NOTES
Patient ID: Stewart Flores is a 79 y.o. male Date of Birth 1945     My role is Foot, Ankle and Wound Specialist    PLAN:    -Educated patient on their condition.   -The patient's wound is not currently infected. We discussed the importance of recognizing systemic and local signs of infection and going directly to the emergency department should any of these occur  -Debridement was performed as described below in order to increase chance of healing and decrease risk of infection.  -Dressings: Dermagran, dry sterile dressing, padded surgical shoe  -Discussed proper care of dressings, patient is not to get them wet at all. If the dressings do get wet, they must be removed and redressed.  -return to wound care in 1 week for cast change  -Patient and wife verbalize understanding of plan     Patient optimization:  -Vascular:              Arterial -follow-up new lower extremity arterial duplex studies scheduled for 11/12/2024              Venous -no current evidence of lower extremity venous disease              Lymphatic -no evidence of lower extremity lymphatic disease     -MSK:              Pressure reduction -total contact casting              Deformity and possible correction -noted Charcot neuroarthropathy rocker-bottom foot deformity with prominence to the plantar medial aspect.                Inserts/DME -patient will require custom molded diabetic sneakers once wound healing is achieved     -Neuro:              Neuropathy - We discussed checking feet daily for additional cuts, bruises, or wounds. We also discussed refraining from walking barefoot and wearing white socks in order to notice drainage     -Nutritional:              Dietary supplementation - Recommend high protein diet with Ricardo supplementation until wound is fully healed              Smoking cessation -patient does use nicotine products              Glycemic control -most recent hemoglobin A1c from 9/16/2024 8.4%     -Imaging/diagnositics:    "           X-ray - X-ray of left foot from 9/25/24 reviewed: Increased soft-tissue density and volume noted at the midfoot with joint effusion noted at the tarsometatarsal joint. Demineralization/sclerosis noted at the tarsometatarsal joint. Cindy-articular erosions noted at the tarsometatarsal joint. I note the foot in a general rocker-bottom position with collapse at the naviculocuneiform and tarsometatarsal joint. Talar-first metatarsal angle on lateral view -27. Calcaneus-cuboid angle on lateral view 1.2mm.               MRI - we will consider MRI in the future should the patient's wound worsen              Biopsy -no indication for biopsy at this time, should no healing be achieved within the next 3 to 4 months we will consider biopsy     -Infection management:              Wound culture -unable to obtain meaningful deep wound culture at this time              Antibiotic -no indication for antibiotic therapy at this time    Debridement   Wound 09/25/24 Diabetic Ulcer Plantar Left    Universal Protocol:  procedure performed by consultantConsent: Verbal consent obtained.  Risks and benefits: risks, benefits and alternatives were discussed  Consent given by: patient  Time out: Immediately prior to procedure a \"time out\" was called to verify the correct patient, procedure, equipment, support staff and site/side marked as required.  Patient understanding: patient states understanding of the procedure being performed  Patient identity confirmed: verbally with patient    Debridement Details  Performed by: physician  Debridement type: surgical  Level of debridement: subcutaneous tissue  Pain control: lidocaine 4%      Post-debridement measurements  Length (cm): 0.2  Width (cm): 0.2  Depth (cm): 0.1  Percent debrided: 90%  Surface Area (cm^2): 0.04  Area Debrided (cm^2): 0.04  Volume (cm^3): 0    Tissue and other material debrided: dermis, epidermis and subcutaneous tissue  Devitalized tissue debrided: biofilm, callus " and fibrin  Instrument(s) utilized: blade  Bleeding: small  Hemostasis obtained with: pressure  Procedural pain (0-10): insensate  Post-procedural pain: insensate   Response to treatment: procedure was tolerated well         Wound Instructions    Orders Placed This Encounter   Procedures    Wound cleansing and dressings     Wash your hands with soap and water.  Remove old dressing, discard into plastic bag and place in trash.  Cleanse the wound with saline prior to applying a clean dressing. Do not use tissue or cotton balls. Do not scrub the wound. Pat dry using gauze.  Shower no   Apply dermagran to the foot wound.  Cover with ABD  Secure with maria esther and tape  Change dressing three times a week    Wear surgical shoe     Standing Status:   Future     Standing Expiration Date:   11/15/2024    Wound Procedure Treatment Diabetic Ulcer Left Plantar     This order was created via procedure documentation    Debridement     This order was created via procedure documentation         SUBJECTIVE:    Chief complaint  Left plantar foot wound    Consult-Stewart is a pleasant 79-year-old male with a past medical history significant for type 2 diabetes, peripheral arterial disease, Charcot neuroarthropathy.  He presents today with a wound to the bottom of his left foot.  He does not recall how this wound started and states that it is not overall painful.  He states that he first noticed it about 1 month ago after a long day of walking.  In regard to his foot deformity, he states that he first noticed his foot becoming misshapen approximately 1 year ago.  He states that at the time the foot was red, hot, and swollen.  He denies any systemic signs of infection over the past week.    11/8/2024: Stewart is doing very well today, he denies any complications since his previous visit.  He denies any systemic signs of infection over the past week.  He states that he tolerated his total contact cast well.         The following portions of  the patient's history were reviewed and updated as appropriate:   There is no problem list on file for this patient.    Past Medical History:   Diagnosis Date    Arthritis     Chronic kidney disease     Diabetes mellitus (HCC)     History of wound infection ?2013    RIGHT LOWER LEG. WAS + FOR STAPH INFECTION AT THAT TIME    Hypertension     Shoulder abrasion     right side after a fall in Feb 2018     Past Surgical History:   Procedure Laterality Date    CHOLECYSTECTOMY      COLONOSCOPY      JOINT REPLACEMENT      KNEE ARTHROPLASTY Left 2014    [place] EXC B9 LESION MRGN XCP SK TG S/N/H/F/G 1.1-2.0CM Left 7/30/2018    Procedure: EXCISIONAL BIOPSY BENIGN NEOPLASM OF SKIN EXTREMITY;  Surgeon: Julio Cesar Trejo Jr., DPM;  Location: [facility];  Service: Podiatry    STEROID INJECTION SHOULDER Right     8 CERIVAL SPINE INJECTIONS    TONSILLECTOMY       Social History     Socioeconomic History    Marital status: /Civil Union     Spouse name: Not on file    Number of children: Not on file    Years of education: Not on file    Highest education level: Not on file   Occupational History    Not on file   Tobacco Use    Smoking status: Every Day     Types: Cigars    Smokeless tobacco: Never    Tobacco comments:     3-5 cigars a day   Substance and Sexual Activity    Alcohol use: Yes     Alcohol/week: 5.0 standard drinks of alcohol     Types: 5 Cans of beer per week     Comment: DAY,SOMETIMES MORE PER PATIENT    Drug use: No    Sexual activity: Not on file   Other Topics Concern    Not on file   Social History Narrative    Not on file     Social Determinants of Health     Financial Resource Strain: Not on file   Food Insecurity: Not on file   Transportation Needs: Not on file   Physical Activity: Not on file   Stress: Not on file   Social Connections: Not on file   Intimate Partner Violence: Not on file   Housing Stability: Not on file        Current Outpatient Medications:     allopurinol (ZYLOPRIM) 100 mg tablet, Take 100 mg  by mouth 2 (two) times a day, Disp: , Rfl:     aspirin (ECOTRIN LOW STRENGTH) 81 mg EC tablet, Take 81 mg by mouth daily, Disp: , Rfl:     calcitriol (ROCALTROL) 0.25 mcg capsule, , Disp: , Rfl:     dapagliflozin (Farxiga) 5 MG TABS, Take by mouth daily, Disp: , Rfl:     eplerenone (INSPRA) 50 MG tablet, Take 50 mg by mouth 2 (two) times a day Takes 2 tabs bid, Disp: , Rfl:     glipiZIDE (GLUCOTROL) 5 mg tablet, Take 5 mg by mouth every morning, Disp: , Rfl:     insulin lispro protamine-insulin lispro (HumaLOG 75/25) 100 units/mL, Inject under the skin 2 (two) times a day before meals 56units a.m. And 54 units hs, Disp: , Rfl:     Klor-Con M20 20 MEQ tablet, , Disp: , Rfl:     losartan (COZAAR) 50 mg tablet, , Disp: , Rfl:     metoprolol succinate (TOPROL-XL) 100 mg 24 hr tablet, , Disp: , Rfl:     omega-3-acid ethyl esters (LOVAZA) 1 g capsule, , Disp: , Rfl:     Potassium Chloride (KCL-20 PO), Take 10 mEq by mouth daily at bedtime (Patient not taking: Reported on 6/27/2023), Disp: , Rfl:     rosuvastatin (CRESTOR) 5 mg tablet, , Disp: , Rfl:     torsemide (DEMADEX) 20 mg tablet, Take 20 mg by mouth 2 (two) times a day 1 tablet in AM and 1/2 tablet pm, Disp: , Rfl:   No family history on file.   Review of Systems   Constitutional:  Negative for appetite change, chills, diaphoresis, fatigue and fever.   HENT: Negative.     Gastrointestinal: Negative.    Skin:  Positive for color change and wound.       Allergies  Ibuprofen    OBJECTIVE:  /84   Pulse 68   Temp (!) 97.4 °F (36.3 °C)   Resp 18     Physical Exam  Constitutional:       Appearance: Normal appearance. He is not ill-appearing or diaphoretic.   HENT:      Head: Normocephalic and atraumatic.   Eyes:      General:         Right eye: No discharge.         Left eye: No discharge.   Pulmonary:      Effort: Pulmonary effort is normal. No respiratory distress.   Musculoskeletal:      Comments: Left foot deformity noted with hallux abductovalgus, pes  planus/slight rocker-bottom deformity noted with plantar medial prominence at the level of the midfoot secondary to Charcot neuroarthropathy   Skin:     Capillary Refill: Capillary refill takes less than 2 seconds.      Comments: See wound assessment below   Neurological:      Mental Status: He is alert.      Sensory: Sensory deficit (7/10 locations felt with monofilament of the left foot) present.   Psychiatric:         Mood and Affect: Mood normal.           Wound 09/25/24 Diabetic Ulcer Plantar Left (Active)   Enter Spence score: Spence Grade 2: Deep ulcer extended to ligament, tendon, joint capsule, bone, or deep fascia without abscess or osteomyelitis (OM) 11/08/24 1354   Wound Image   11/08/24 1406   Wound Description [place] 11/08/24 1354   Cindy-wound Assessment Callus;[place] 11/08/24 1354   Wound Length (cm) 0.2 cm 11/08/24 1354   Wound Width (cm) 0.2 cm 11/08/24 1354   Wound Depth (cm) 0.1 cm 11/08/24 1354   Wound Surface Area (cm^2) 0.04 cm^2 11/08/24 1354   Wound Volume (cm^3) 0.004 cm^3 11/08/24 1354   Calculated Wound Volume (cm^3) 0 cm^3 11/08/24 1354   Change in Wound Size % 100 11/08/24 1354   Drainage Amount Small 11/08/24 1354   Drainage Description Serosanguineous 11/08/24 1354   Non-staged Wound Description Full thickness 11/08/24 1354   Dressing Status Intact 10/25/24 1517           Wound 09/25/24 Diabetic Ulcer Plantar Left (Active)   Enter Spence score: Spence Grade 2: Deep ulcer extended to ligament, tendon, joint capsule, bone, or deep fascia without abscess or osteomyelitis (OM) 11/08/24 1354   Wound Image   11/08/24 1406   Wound Description [place] 11/08/24 1354   Cindy-wound Assessment Callus;[place] 11/08/24 1354   Wound Length (cm) 0.2 cm 11/08/24 1354   Wound Width (cm) 0.2 cm 11/08/24 1354   Wound Depth (cm) 0.1 cm 11/08/24 1354   Wound Surface Area (cm^2) 0.04 cm^2 11/08/24 1354   Wound Volume (cm^3) 0.004 cm^3 11/08/24 1354   Calculated Wound Volume (cm^3) 0 cm^3 11/08/24 1354   Change in Wound  Size % 100 11/08/24 1354   Drainage Amount Small 11/08/24 1354   Drainage Description Serosanguineous 11/08/24 1354   Non-staged Wound Description Full thickness 11/08/24 1354   Dressing Status Intact 10/25/24 2293                         Diagnosis:  1. Diabetic ulcer of left midfoot associated with type 2 diabetes mellitus, with fat layer exposed (HCC)  -     Wound cleansing and dressings; Future  -     Wound Procedure Treatment Diabetic Ulcer Left Plantar  2. Charcot arthropathy of midfoot  -     Wound cleansing and dressings; Future  -     Wound Procedure Treatment Diabetic Ulcer Left Plantar  3. Type 2 diabetes mellitus with foot ulcer, without long-term current use of insulin (HCC)      Diagnosis ICD-10-CM Associated Orders   1. Diabetic ulcer of left midfoot associated with type 2 diabetes mellitus, with fat layer exposed (HCC)  E11.621 Wound cleansing and dressings    L97.422 Wound Procedure Treatment Diabetic Ulcer Left Plantar      2. Charcot arthropathy of midfoot  M14.679 Wound cleansing and dressings     Wound Procedure Treatment Diabetic Ulcer Left Plantar      3. Type 2 diabetes mellitus with foot ulcer, without long-term current use of insulin (HCC)  E11.621     L97.509            ASSESSMENT:    1) left plantar medial foot diabetic ulceration to the level of subcutaneous tissue, Spence 2, 90% smaller over the past 30-days  2) type 2 diabetes with peripheral neuropathy  3) Charcot neuroarthropathy

## 2024-11-12 ENCOUNTER — HOSPITAL ENCOUNTER (OUTPATIENT)
Dept: RADIOLOGY | Facility: HOSPITAL | Age: 79
Discharge: HOME | End: 2024-11-12
Attending: STUDENT IN AN ORGANIZED HEALTH CARE EDUCATION/TRAINING PROGRAM
Payer: COMMERCIAL

## 2024-11-12 DIAGNOSIS — E11.621 DIABETIC ULCER OF LEFT MIDFOOT ASSOCIATED WITH TYPE 2 DIABETES MELLITUS, WITH FAT LAYER EXPOSED (HCC): ICD-10-CM

## 2024-11-12 DIAGNOSIS — L97.422 DIABETIC ULCER OF LEFT MIDFOOT ASSOCIATED WITH TYPE 2 DIABETES MELLITUS, WITH FAT LAYER EXPOSED (HCC): ICD-10-CM

## 2024-11-12 DIAGNOSIS — R09.89 WEAK ARTERIAL PULSE: ICD-10-CM

## 2024-11-12 PROCEDURE — 93923 UPR/LXTR ART STDY 3+ LVLS: CPT

## 2024-11-12 PROCEDURE — 93925 LOWER EXTREMITY STUDY: CPT

## 2024-11-13 PROCEDURE — 93925 LOWER EXTREMITY STUDY: CPT | Performed by: SURGERY

## 2024-11-13 PROCEDURE — 93922 UPR/L XTREMITY ART 2 LEVELS: CPT | Performed by: SURGERY

## 2024-11-14 ENCOUNTER — OFFICE VISIT (OUTPATIENT)
Dept: WOUND CARE | Facility: HOSPITAL | Age: 79
End: 2024-11-14
Payer: COMMERCIAL

## 2024-11-14 VITALS
DIASTOLIC BLOOD PRESSURE: 70 MMHG | TEMPERATURE: 97.6 F | SYSTOLIC BLOOD PRESSURE: 157 MMHG | RESPIRATION RATE: 18 BRPM | HEART RATE: 72 BPM

## 2024-11-14 DIAGNOSIS — E11.621 DIABETIC ULCER OF LEFT MIDFOOT ASSOCIATED WITH TYPE 2 DIABETES MELLITUS, WITH FAT LAYER EXPOSED (HCC): Primary | ICD-10-CM

## 2024-11-14 DIAGNOSIS — M14.679 CHARCOT ARTHROPATHY OF MIDFOOT: ICD-10-CM

## 2024-11-14 DIAGNOSIS — L97.422 DIABETIC ULCER OF LEFT MIDFOOT ASSOCIATED WITH TYPE 2 DIABETES MELLITUS, WITH FAT LAYER EXPOSED (HCC): Primary | ICD-10-CM

## 2024-11-14 PROCEDURE — 11042 DBRDMT SUBQ TIS 1ST 20SQCM/<: CPT | Performed by: FAMILY MEDICINE

## 2024-11-14 PROCEDURE — 99204 OFFICE O/P NEW MOD 45 MIN: CPT | Performed by: FAMILY MEDICINE

## 2024-11-14 NOTE — PROGRESS NOTES
Wound Procedure Treatment Diabetic Ulcer Left Plantar    Performed by: Charlotte Shoemaker LPN  Authorized by: Yasmine Quinones DO    Associated wounds:   Wound 09/25/24 Diabetic Ulcer Plantar Left  Wound cleansed with:  NSS  Applied Topical: Hydrogel    Applied primary dressing:  Silver and Collagen dressing  Applied secondary dressing:  ABD  Dressing secured with:  Tape  Offloading device appllied:  TCC and Felt padding 1/4 inch

## 2024-11-14 NOTE — PATIENT INSTRUCTIONS
Orders Placed This Encounter   Procedures    Wound Procedure Treatment Diabetic Ulcer Left Plantar     This order was created via procedure documentation    Wound cleansing and dressings     Total Contact Cast Procedure:    Before application, EVERETT and/or TBI determined to be adequate for healing and application of a Total Contact cast.  A Total Contact Cast procedure was performed for to the Left leg.  The procedure was tolerated well with pain level of 0 throughout and a pain level of 0 following the procedure.     Standing Status:   Future     Expiration Date:   11/21/2024

## 2024-11-14 NOTE — PROGRESS NOTES
Name: Stewart Flores      : 1945      MRN: [id number]  Encounter Provider: Yasmine Quinones DO  Encounter Date: 2024   [facility]  :  Assessment & Plan  Diabetic ulcer of left midfoot associated with type 2 diabetes mellitus, with fat layer exposed (HCC)  Wound is worse  Surgically debrided as below  Change wound management to Puracol Ag, see wound orders below  Discussed the importance of tight diabetic control and proper offloading  TCC applied today   A1C results reviewed with the patient today.  Follow-up with Dr. White in 1 week or call sooner with questions or concerns    Lab Results   Component Value Date    HGBA1C 8.4 (H) 2024       Orders:    Wound Procedure Treatment Diabetic Ulcer Left Plantar    Wound cleansing and dressings; Future    Debridement Diabetic Ulcer Left Plantar    Charcot arthropathy of midfoot    Orders:    Wound Procedure Treatment Diabetic Ulcer Left Plantar    Wound cleansing and dressings; Future        History of Present Illness   Chief Complaint   Patient presents with    Follow Up Wound Care Visit   Patient is a patient of Dr. White who presents today for follow-up of a Spence 2 DFU of the left plantar foot.  No increased pain or drainage.  Has had Dermagran on the wound and used surgical shoe for offloading this week.  Prior to that he had a TCC.  The TCC was held this past week because he just obtained arterial studies 2 days ago.  Blood sugars range from 140s to 250s      Here for wound follow up.    Objective   /70   Pulse 72   Temp 97.6 °F (36.4 °C)   Resp 18     Physical Exam  Wound 24 Diabetic Ulcer Plantar Left (Active)   Enter Spence score: Spence Grade 2: Deep ulcer extended to ligament, tendon, joint capsule, bone, or deep fascia without abscess or osteomyelitis (OM) 24 1336   Wound Image   24 1336   Wound Description Yellow 24 1336   Cindy-wound Assessment  "Callus 11/14/24 1336   Wound Length (cm) 0.2 cm 11/14/24 1336   Wound Width (cm) 0.2 cm 11/14/24 1336   Wound Depth (cm) 0.1 cm 11/14/24 1336   Wound Surface Area (cm^2) 0.04 cm^2 11/14/24 1336   Wound Volume (cm^3) 0.004 cm^3 11/14/24 1336   Calculated Wound Volume (cm^3) 0 cm^3 11/14/24 1336   Change in Wound Size % 100 11/14/24 1336   Drainage Amount None 11/14/24 1336   Drainage Description Serosanguineous 11/08/24 1354   Non-staged Wound Description Full thickness 11/08/24 1354   Dressing Status Intact 10/25/24 1517       Debridement   Wound 09/25/24 Diabetic Ulcer Plantar Left    Universal Protocol:  procedure performed by consultantConsent: Verbal consent obtained.  Consent given by: patient  Time out: Immediately prior to procedure a \"time out\" was called to verify the correct patient, procedure, equipment, support staff and site/side marked as required.  Timeout called at: 11/14/2024 1:50 PM.  Patient understanding: patient states understanding of the procedure being performed  Patient identity confirmed: verbally with patient    Debridement Details  Performed by: physician  Debridement type: surgical  Level of debridement: subcutaneous tissue  Pain control: lidocaine 4%      Post-debridement measurements  Length (cm): 0.2  Width (cm): 0.2  Depth (cm): 0.3  Percent debrided: 100%  Surface Area (cm^2): 0.04  Area Debrided (cm^2): 0.04  Volume (cm^3): 0.01    Tissue and other material debrided: subcutaneous tissue  Devitalized tissue debrided: exudate and slough  Instrument(s) utilized: curette  Bleeding: small  Hemostasis obtained with: pressure  Response to treatment: procedure was tolerated well             Administrative Statements   I have spent a total time of 30 minutes in caring for this patient on the day of the visit/encounter including Risks and benefits of tx options, Instructions for management, Patient and family education, Importance of tx compliance, Risk factor reductions, and Documenting in " "the medical record.   Portions of the record may have been created with voice recognition software.  Occasional wrong word or \"sound a like\" substitutions may have occurred due to the inherent limitations of voice recognition software.  Read the chart carefully and recognize, using context, where substitutions have occurred.  "

## 2024-11-20 ENCOUNTER — OFFICE VISIT (OUTPATIENT)
Dept: WOUND CARE | Facility: HOSPITAL | Age: 79
End: 2024-11-20
Payer: COMMERCIAL

## 2024-11-20 VITALS
HEART RATE: 59 BPM | TEMPERATURE: 97 F | SYSTOLIC BLOOD PRESSURE: 150 MMHG | DIASTOLIC BLOOD PRESSURE: 83 MMHG | RESPIRATION RATE: 18 BRPM

## 2024-11-20 DIAGNOSIS — M14.672 CHARCOT JOINT OF LEFT FOOT: ICD-10-CM

## 2024-11-20 DIAGNOSIS — E11.621 DIABETIC ULCER OF LEFT MIDFOOT ASSOCIATED WITH TYPE 2 DIABETES MELLITUS, WITH FAT LAYER EXPOSED (HCC): Primary | ICD-10-CM

## 2024-11-20 DIAGNOSIS — E11.621 TYPE 2 DIABETES MELLITUS WITH FOOT ULCER, WITHOUT LONG-TERM CURRENT USE OF INSULIN (HCC): ICD-10-CM

## 2024-11-20 DIAGNOSIS — Z01.818 PREOPERATIVE CLEARANCE: ICD-10-CM

## 2024-11-20 DIAGNOSIS — M14.679 CHARCOT ARTHROPATHY OF MIDFOOT: ICD-10-CM

## 2024-11-20 DIAGNOSIS — L97.422 DIABETIC ULCER OF LEFT MIDFOOT ASSOCIATED WITH TYPE 2 DIABETES MELLITUS, WITH FAT LAYER EXPOSED (HCC): Primary | ICD-10-CM

## 2024-11-20 DIAGNOSIS — L97.509 TYPE 2 DIABETES MELLITUS WITH FOOT ULCER, WITHOUT LONG-TERM CURRENT USE OF INSULIN (HCC): ICD-10-CM

## 2024-11-20 PROCEDURE — 29445 APPL RIGID TOT CNTC LEG CAST: CPT | Performed by: STUDENT IN AN ORGANIZED HEALTH CARE EDUCATION/TRAINING PROGRAM

## 2024-11-20 RX ORDER — CHLORHEXIDINE GLUCONATE ORAL RINSE 1.2 MG/ML
15 SOLUTION DENTAL ONCE
OUTPATIENT
Start: 2024-11-20 | End: 2024-11-20

## 2024-11-20 RX ORDER — LIDOCAINE 40 MG/G
CREAM TOPICAL ONCE
Status: COMPLETED | OUTPATIENT
Start: 2024-11-20 | End: 2024-11-20

## 2024-11-20 RX ORDER — CHLORHEXIDINE GLUCONATE 40 MG/ML
SOLUTION TOPICAL DAILY PRN
OUTPATIENT
Start: 2024-11-20

## 2024-11-20 RX ORDER — CEFAZOLIN SODIUM 2 G/50ML
2000 SOLUTION INTRAVENOUS ONCE
OUTPATIENT
Start: 2024-11-20 | End: 2024-11-20

## 2024-11-20 RX ADMIN — LIDOCAINE 1 APPLICATION: 40 CREAM TOPICAL at 14:14

## 2024-11-20 NOTE — PATIENT INSTRUCTIONS
Orders Placed This Encounter   Procedures    Wound cleansing and dressings Diabetic Ulcer Left Plantar     LEFT FOOT WOUND:    Total Contact Cast Procedure:    A Total Contact Cast procedure was performed for to the LEFT LEG.  The procedure was tolerated well with pain level of 0 throughout and a pain level of 0 following the procedure.       Wound infection:  If you have signs of infection please call the wound center.  If the wound center is closed- please go to the Emergency department.  Some signs of infection:  fever, chills, increased redness, red streaks, increase in pain, increased drainage.  Drainage with an odor, Change in drainage color: white/milky/green/tan/yellow,  an increase in swelling, chest pain and/or shortness of breath.     Protein: Eat protein with each meal to promote healing.  Examples of protein are fish, meat, chicken, nuts, peanut butter, eggs, lentils, edamame or a protein shake.     Standing Status:   Future     Expiration Date:   11/27/2024      Orders Placed This Encounter   Procedures    Wound cleansing and dressings Diabetic Ulcer Left Plantar     LEFT FOOT WOUND:    Total Contact Cast Procedure:    A Total Contact Cast procedure was performed for to the LEFT LEG.  The procedure was tolerated well with pain level of 0 throughout and a pain level of 0 following the procedure.       Wound infection:  If you have signs of infection please call the wound center.  If the wound center is closed- please go to the Emergency department.  Some signs of infection:  fever, chills, increased redness, red streaks, increase in pain, increased drainage.  Drainage with an odor, Change in drainage color: white/milky/green/tan/yellow,  an increase in swelling, chest pain and/or shortness of breath.     Protein: Eat protein with each meal to promote healing.  Examples of protein are fish, meat, chicken, nuts, peanut butter, eggs, lentils, edamame or a protein shake.     Standing Status:   Future      Expiration Date:   11/27/2024    Wound Procedure Treatment Diabetic Ulcer Left Plantar     This order was created via procedure documentation      Orders Placed This Encounter   Procedures    Wound cleansing and dressings Diabetic Ulcer Left Plantar     LEFT FOOT WOUND:    Total Contact Cast Procedure:    A Total Contact Cast procedure was performed for to the LEFT LEG.  The procedure was tolerated well with pain level of 0 throughout and a pain level of 0 following the procedure.       Wound infection:  If you have signs of infection please call the wound center.  If the wound center is closed- please go to the Emergency department.  Some signs of infection:  fever, chills, increased redness, red streaks, increase in pain, increased drainage.  Drainage with an odor, Change in drainage color: white/milky/green/tan/yellow,  an increase in swelling, chest pain and/or shortness of breath.     Protein: Eat protein with each meal to promote healing.  Examples of protein are fish, meat, chicken, nuts, peanut butter, eggs, lentils, edamame or a protein shake.     Standing Status:   Future     Expiration Date:   11/27/2024    Wound Procedure Treatment Diabetic Ulcer Left Plantar     This order was created via procedure documentation

## 2024-11-20 NOTE — PROGRESS NOTES
Wound Procedure Treatment Diabetic Ulcer Left Plantar    Performed by: Amrita Woodson RN  Authorized by: Ollie White DPM    Associated wounds:   Wound 09/25/24 Diabetic Ulcer Plantar Left  Wound cleansed with:  NSS and Soap and water  Applied to periwound:  Moisture lotion and Skin prep  Applied primary dressing:  Non adherent contact layer  Applied secondary dressing:  ABD  Dressing secured with:  Tape  Offloading device appllied:  TCC and Felt padding 1/4 inch  Comments:    Xeroform placed on the wound  Double layer of felt padding

## 2024-11-24 NOTE — PROGRESS NOTES
Patient ID: Stewart Flores is a 79 y.o. male Date of Birth 1945     My role is Foot, Ankle and Wound Specialist    PLAN:    -Educated patient on their condition.   -The patient's wound is not currently infected. We discussed the importance of recognizing systemic and local signs of infection and going directly to the emergency department should any of these occur  -Dressings: total contact cast  -Discussed proper care of dressings, patient is not to get them wet at all. If the dressings do get wet, they must be removed and redressed.  -We did discuss risks and benefits of surgical intervention to the patient's left foot in detail today.  I explained that his options are continued conservative treatment with offloading and custom molded sneakers after wound healing is achieved, exostectomy/saucerization of bony prominence to the plantar foot, full Charcot reconstruction.  We discussed the operative indications and postoperative courses for each.  Ultimately the patient would like to try a minimally invasive exostectomy to reduce the plantar pressure to his foot.  He understands all risks, complications, and alternatives.  Will follow-up PCP clearance, imaging, and preoperative lab work.  He understands that his type 2 diabetes as well as tobacco use puts him at an increased risk of skin healing complications and infection.  -return to wound care in 1 week for cast change  -Patient and wife verbalize understanding of plan     Patient optimization:  -Vascular:              Arterial -lower extremity arterial duplex from 11/12/2024 reviewed: Diffuse disease noted bilaterally without significant stenosis.  Great toe pressures are within healing range bilaterally for diabetic patient.              Venous -no current evidence of lower extremity venous disease              Lymphatic -no evidence of lower extremity lymphatic disease     -MSK:              Pressure reduction -total contact casting               Deformity and possible correction -noted Charcot neuroarthropathy rocker-bottom foot deformity with prominence to the plantar medial aspect.                Inserts/DME -patient will require custom molded diabetic sneakers once wound healing is achieved     -Neuro:              Neuropathy - We discussed checking feet daily for additional cuts, bruises, or wounds. We also discussed refraining from walking barefoot and wearing white socks in order to notice drainage     -Nutritional:              Dietary supplementation - Recommend high protein diet with Ricardo supplementation until wound is fully healed              Smoking cessation -patient does use nicotine products              Glycemic control - most recent hemoglobin A1c from 9/16/2024 8.4%.  Follow-up new hemoglobin A1c preoperatively     -Imaging/diagnositics:              X-ray - X-ray of left foot from 9/25/24 reviewed: Increased soft-tissue density and volume noted at the midfoot with joint effusion noted at the tarsometatarsal joint. Demineralization/sclerosis noted at the tarsometatarsal joint. Cindy-articular erosions noted at the tarsometatarsal joint. I note the foot in a general rocker-bottom position with collapse at the naviculocuneiform and tarsometatarsal joint. Talar-first metatarsal angle on lateral view -27. Calcaneus-cuboid angle on lateral view 1.2mm.               MRI - we will consider MRI in the future should the patient's wound worsen              Biopsy -no indication for biopsy at this time, should no healing be achieved within the next 3 to 4 months we will consider biopsy     -Infection management:              Wound culture -unable to obtain meaningful deep wound culture at this time              Antibiotic -no indication for antibiotic therapy at this time    Cast Application    Date/Time: 11/20/2024 12:45 PM    Performed by: Ollie White DPM  Authorized by: Ollie White DPM  Universal Protocol:  procedure performed by  "consultantConsent: Verbal consent obtained.  Risks and benefits: risks, benefits and alternatives were discussed  Consent given by: patient  Time out: Immediately prior to procedure a \"time out\" was called to verify the correct patient, procedure, equipment, support staff and site/side marked as required.  Patient understanding: patient states understanding of the procedure being performed  Site marked: the operative site was marked  Required items: required blood products, implants, devices, and special equipment available  Patient identity confirmed: verbally with patient    Pre-procedure details:     Sensation:  Numbness  Procedure details:     Laterality:  Left    Location:  Leg    Leg:  L lower legCast type:  Total contact      Post-procedure details:     Pain:  Unchanged    Sensation:  Numbness    Patient tolerance of procedure:  Tolerated well, no immediate complications       Wound Instructions    Orders Placed This Encounter   Procedures    Wound cleansing and dressings Diabetic Ulcer Left Plantar     LEFT FOOT WOUND:    Total Contact Cast Procedure:    A Total Contact Cast procedure was performed for to the LEFT LEG.  The procedure was tolerated well with pain level of 0 throughout and a pain level of 0 following the procedure.       Wound infection:  If you have signs of infection please call the wound center.  If the wound center is closed- please go to the Emergency department.  Some signs of infection:  fever, chills, increased redness, red streaks, increase in pain, increased drainage.  Drainage with an odor, Change in drainage color: white/milky/green/tan/yellow,  an increase in swelling, chest pain and/or shortness of breath.     Protein: Eat protein with each meal to promote healing.  Examples of protein are fish, meat, chicken, nuts, peanut butter, eggs, lentils, edamame or a protein shake.     Standing Status:   Future     Expiration Date:   11/27/2024    Wound Procedure Treatment Diabetic Ulcer " Left Plantar     This order was created via procedure documentation    Cast Application     This order was created via procedure documentation    XR chest pa and lateral     Standing Status:   Future     Expected Date:   11/20/2024     Expiration Date:   11/20/2028     Scheduling Instructions:      Bring along any outside films relating to this procedure.          CBC and differential     This is a patient instruction: This test is non-fasting. Please drink two glasses of water morning of bloodwork.        Standing Status:   Future     Expiration Date:   11/20/2025    Comprehensive metabolic panel     This is a patient instruction: Patient fasting for 8 hours or longer recommended.     Standing Status:   Future     Expiration Date:   11/20/2025    HEMOGLOBIN A1C W/ EAG ESTIMATION     Standing Status:   Future     Expiration Date:   11/20/2025    Ambulatory referral to [facility]     Standing Status:   Future     Expiration Date:   11/20/2025     Referral Priority:   Routine     Referral Type:   Consult - AMB     Referral Reason:   Specialty Services Required     Requested Specialty:   Family Medicine     Number of Visits Requested:   1     Expiration Date:   11/20/2025    EKG 12 lead     Standing Status:   Future     Expiration Date:   11/20/2025     SUBJECTIVE:    Chief complaint  Left foot wound    Consult-Stewart is a pleasant 79-year-old male with a past medical history significant for type 2 diabetes, peripheral arterial disease, Charcot neuroarthropathy.  He presents today with a wound to the bottom of his left foot.  He does not recall how this wound started and states that it is not overall painful.  He states that he first noticed it about 1 month ago after a long day of walking.  In regard to his foot deformity, he states that he first noticed his foot becoming misshapen approximately 1 year ago.  He states that at the time the foot was red, hot, and swollen.  He denies any systemic signs of infection  over the past week.    11/22/2024: Stewart is doing very well today, he denies any complications since his previous visit.  He denies any systemic signs of infection over the past week.         The following portions of the patient's history were reviewed and updated as appropriate:   There is no problem list on file for this patient.    Past Medical History:   Diagnosis Date    Arthritis     Chronic kidney disease     Diabetes mellitus (HCC)     History of wound infection ?2013    RIGHT LOWER LEG. WAS + FOR STAPH INFECTION AT THAT TIME    Hypertension     Shoulder abrasion     right side after a fall in Feb 2018     Past Surgical History:   Procedure Laterality Date    CHOLECYSTECTOMY      COLONOSCOPY      JOINT REPLACEMENT      KNEE ARTHROPLASTY Left 2014    TN EXC B9 LESION MRGN XCP SK TG S/N/H/F/G 1.1-2.0CM Left 7/30/2018    Procedure: EXCISIONAL BIOPSY BENIGN NEOPLASM OF SKIN EXTREMITY;  Surgeon: Julio Cesar Trejo Jr., DPM;  Location: [facility];  Service: Podiatry    STEROID INJECTION SHOULDER Right     8 CERIVAL SPINE INJECTIONS    TONSILLECTOMY       Social History     Socioeconomic History    Marital status: /Civil Union     Spouse name: Not on file    Number of children: Not on file    Years of education: Not on file    Highest education level: Not on file   Occupational History    Not on file   Tobacco Use    Smoking status: Every Day     Types: Cigars    Smokeless tobacco: Never    Tobacco comments:     3-5 cigars a day   Substance and Sexual Activity    Alcohol use: Yes     Alcohol/week: 5.0 standard drinks of alcohol     Types: 5 Cans of beer per week     Comment: DAY,SOMETIMES MORE PER PATIENT    Drug use: No    Sexual activity: Not on file   Other Topics Concern    Not on file   Social History Narrative    Not on file     Social Drivers of Health     Financial Resource Strain: Not on file   Food Insecurity: Not on file   Transportation Needs: Not on file   Physical Activity: Not on file    Stress: Not on file   Social Connections: Not on file   Intimate Partner Violence: Not on file   Housing Stability: Not on file        Current Outpatient Medications:     allopurinol (ZYLOPRIM) 100 mg tablet, Take 100 mg by mouth 2 (two) times a day, Disp: , Rfl:     aspirin (ECOTRIN LOW STRENGTH) 81 mg EC tablet, Take 81 mg by mouth daily, Disp: , Rfl:     calcitriol (ROCALTROL) 0.25 mcg capsule, , Disp: , Rfl:     dapagliflozin (Farxiga) 5 MG TABS, Take by mouth daily, Disp: , Rfl:     eplerenone (INSPRA) 50 MG tablet, Take 50 mg by mouth 2 (two) times a day Takes 2 tabs bid, Disp: , Rfl:     glipiZIDE (GLUCOTROL) 5 mg tablet, Take 5 mg by mouth every morning, Disp: , Rfl:     insulin lispro protamine-insulin lispro (HumaLOG 75/25) 100 units/mL, Inject under the skin 2 (two) times a day before meals 56units a.m. And 54 units hs, Disp: , Rfl:     Klor-Con M20 20 MEQ tablet, , Disp: , Rfl:     losartan (COZAAR) 50 mg tablet, , Disp: , Rfl:     metoprolol succinate (TOPROL-XL) 100 mg 24 hr tablet, , Disp: , Rfl:     omega-3-acid ethyl esters (LOVAZA) 1 g capsule, , Disp: , Rfl:     Potassium Chloride (KCL-20 PO), Take 10 mEq by mouth daily at bedtime (Patient not taking: Reported on 6/27/2023), Disp: , Rfl:     rosuvastatin (CRESTOR) 5 mg tablet, , Disp: , Rfl:     torsemide (DEMADEX) 20 mg tablet, Take 20 mg by mouth 2 (two) times a day 1 tablet in AM and 1/2 tablet pm, Disp: , Rfl:   No family history on file.   Review of Systems   Constitutional:  Negative for appetite change, chills, diaphoresis, fatigue and fever.   HENT: Negative.     Gastrointestinal: Negative.    Skin:  Positive for color change and wound.       Allergies  Ibuprofen    OBJECTIVE:  /83   Pulse 59   Temp (!) 97 °F (36.1 °C)   Resp 18     Physical Exam  Constitutional:       Appearance: Normal appearance. He is not ill-appearing or diaphoretic.   HENT:      Head: Normocephalic and atraumatic.   Eyes:      General:         Right eye:  No discharge.         Left eye: No discharge.   Pulmonary:      Effort: Pulmonary effort is normal. No respiratory distress.   Musculoskeletal:      Comments: Left foot deformity noted with hallux abductovalgus, pes planus/slight rocker-bottom deformity noted with plantar medial prominence at the level of the midfoot secondary to Charcot neuroarthropathy   Skin:     Capillary Refill: Capillary refill takes less than 2 seconds.      Comments: See wound assessment below   Neurological:      Mental Status: He is alert.      Sensory: Sensory deficit (7/10 locations felt with monofilament of the left foot) present.   Psychiatric:         Mood and Affect: Mood normal.           Wound 09/25/24 Diabetic Ulcer Plantar Left (Active)   Enter Spence score: Spence Grade 2: Deep ulcer extended to ligament, tendon, joint capsule, bone, or deep fascia without abscess or osteomyelitis (OM) 11/20/24 1307   Wound Image   11/20/24 1341   Wound Description Brown 11/20/24 1307   Cindy-wound Assessment Callus 11/20/24 1307   Wound Length (cm) 0.2 cm 11/20/24 1307   Wound Width (cm) 0.2 cm 11/20/24 1307   Wound Depth (cm) 0.1 cm 11/20/24 1307   Wound Surface Area (cm^2) 0.04 cm^2 11/20/24 1307   Wound Volume (cm^3) 0.004 cm^3 11/20/24 1307   Calculated Wound Volume (cm^3) 0 cm^3 11/20/24 1307   Change in Wound Size % 100 11/20/24 1307   Drainage Amount Scant 11/20/24 1307   Drainage Description Sanguineous 11/20/24 1307   Non-staged Wound Description Full thickness 11/20/24 1307   Dressing Status Intact 11/20/24 1307           Wound 09/25/24 Diabetic Ulcer Plantar Left (Active)   Enter Spence score: Spence Grade 2: Deep ulcer extended to ligament, tendon, joint capsule, bone, or deep fascia without abscess or osteomyelitis (OM) 11/20/24 1307   Wound Image   11/20/24 1341   Wound Description Brown 11/20/24 1307   Cindy-wound Assessment Callus 11/20/24 1307   Wound Length (cm) 0.2 cm 11/20/24 1307   Wound Width (cm) 0.2 cm 11/20/24 1307    Wound Depth (cm) 0.1 cm 11/20/24 1307   Wound Surface Area (cm^2) 0.04 cm^2 11/20/24 1307   Wound Volume (cm^3) 0.004 cm^3 11/20/24 1307   Calculated Wound Volume (cm^3) 0 cm^3 11/20/24 1307   Change in Wound Size % 100 11/20/24 1307   Drainage Amount Scant 11/20/24 1307   Drainage Description Sanguineous 11/20/24 1307   Non-staged Wound Description Full thickness 11/20/24 1307   Dressing Status Intact 11/20/24 1307                         Diagnosis:  1. Diabetic ulcer of left midfoot associated with type 2 diabetes mellitus, with fat layer exposed (HCC)  -     lidocaine (LMX) 4 % cream  -     Wound cleansing and dressings Diabetic Ulcer Left Plantar; Future  -     Wound Procedure Treatment Diabetic Ulcer Left Plantar  -     Case request operating room: EXCISION EXOSTOSIS/saucerization left foot; Standing  -     Case request operating room: EXCISION EXOSTOSIS/saucerization left foot  2. Charcot joint of left foot  -     Case request operating room: EXCISION EXOSTOSIS/saucerization left foot; Standing  -     Case request operating room: EXCISION EXOSTOSIS/saucerization left foot  3. Preoperative clearance  -     Ambulatory referral to Family Practice; Future  -     CBC and differential; Future  -     Comprehensive metabolic panel; Future  -     EKG 12 lead; Future  -     HEMOGLOBIN A1C W/ EAG ESTIMATION; Future  -     XR chest pa and lateral; Future; Expected date: 11/20/2024  4. Type 2 diabetes mellitus with foot ulcer, without long-term current use of insulin (HCC)  -     HEMOGLOBIN A1C W/ EAG ESTIMATION; Future  5. Charcot arthropathy of midfoot      Diagnosis ICD-10-CM Associated Orders   1. Diabetic ulcer of left midfoot associated with type 2 diabetes mellitus, with fat layer exposed (HCC)  E11.621 lidocaine (LMX) 4 % cream    L97.422 Wound cleansing and dressings Diabetic Ulcer Left Plantar     Wound Procedure Treatment Diabetic Ulcer Left Plantar     Case request operating room: EXCISION  EXOSTOSIS/saucerization left foot     Case request operating room: EXCISION EXOSTOSIS/saucerization left foot      2. Charcot joint of left foot  M14.672 Case request operating room: EXCISION EXOSTOSIS/saucerization left foot     Case request operating room: EXCISION EXOSTOSIS/saucerization left foot      3. Preoperative clearance  Z01.818 Ambulatory referral to [facility]     CBC and differential     Comprehensive metabolic panel     EKG 12 lead     HEMOGLOBIN A1C W/ EAG ESTIMATION     XR chest pa and lateral      4. Type 2 diabetes mellitus with foot ulcer, without long-term current use of insulin ([facility])  E11.621 HEMOGLOBIN A1C W/ EAG ESTIMATION    L97.509       5. Charcot arthropathy of midfoot  M14.679            ASSESSMENT:    1) left plantar medial foot diabetic ulceration to the level of subcutaneous tissue, Spence 2, 81% smaller over the past 30-days  2) type 2 diabetes with peripheral neuropathy  3) Charcot neuroarthropathy of the left midfoot

## 2024-11-27 ENCOUNTER — OFFICE VISIT (OUTPATIENT)
Dept: WOUND CARE | Facility: HOSPITAL | Age: 79
End: 2024-11-27
Payer: COMMERCIAL

## 2024-11-27 ENCOUNTER — TELEPHONE (OUTPATIENT)
Dept: OBGYN CLINIC | Facility: CLINIC | Age: 79
End: 2024-11-27

## 2024-11-27 VITALS
HEART RATE: 59 BPM | DIASTOLIC BLOOD PRESSURE: 81 MMHG | RESPIRATION RATE: 18 BRPM | TEMPERATURE: 97.4 F | SYSTOLIC BLOOD PRESSURE: 131 MMHG

## 2024-11-27 DIAGNOSIS — E11.621 DIABETIC ULCER OF LEFT MIDFOOT ASSOCIATED WITH TYPE 2 DIABETES MELLITUS, WITH FAT LAYER EXPOSED (HCC): Primary | ICD-10-CM

## 2024-11-27 DIAGNOSIS — M14.672 CHARCOT JOINT OF LEFT FOOT: ICD-10-CM

## 2024-11-27 DIAGNOSIS — L97.422 DIABETIC ULCER OF LEFT MIDFOOT ASSOCIATED WITH TYPE 2 DIABETES MELLITUS, WITH FAT LAYER EXPOSED (HCC): Primary | ICD-10-CM

## 2024-11-27 DIAGNOSIS — E11.621 TYPE 2 DIABETES MELLITUS WITH FOOT ULCER, WITHOUT LONG-TERM CURRENT USE OF INSULIN (HCC): ICD-10-CM

## 2024-11-27 DIAGNOSIS — L97.509 TYPE 2 DIABETES MELLITUS WITH FOOT ULCER, WITHOUT LONG-TERM CURRENT USE OF INSULIN (HCC): ICD-10-CM

## 2024-11-27 PROCEDURE — 11042 DBRDMT SUBQ TIS 1ST 20SQCM/<: CPT | Performed by: STUDENT IN AN ORGANIZED HEALTH CARE EDUCATION/TRAINING PROGRAM

## 2024-11-27 RX ORDER — LIDOCAINE 40 MG/G
CREAM TOPICAL ONCE
Status: COMPLETED | OUTPATIENT
Start: 2024-11-27 | End: 2024-11-27

## 2024-11-27 RX ADMIN — LIDOCAINE 1 APPLICATION: 40 CREAM TOPICAL at 14:03

## 2024-11-27 NOTE — PROGRESS NOTES
Wound Procedure Treatment Diabetic Ulcer Left Plantar    Performed by: Amrita oWodson RN  Authorized by: Ollie White DPM    Associated wounds:   Wound 09/25/24 Diabetic Ulcer Plantar Left  Wound cleansed with:  NSS and Soap and water  Applied to periwound:  Moisture lotion  Applied primary dressing:  Collagen dressing  Applied secondary dressing:  ABD  Dressing secured with:  Amandeep and Tape  Comments:  3 layer of felt padding on the off loading shoe.

## 2024-11-27 NOTE — PATIENT INSTRUCTIONS
Orders Placed This Encounter   Procedures    Wound Procedure Treatment Diabetic Ulcer Left Plantar     This order was created via procedure documentation    Wound cleansing and dressings Diabetic Ulcer Left Plantar     LEFT FOOT WOUND:     Wash your hands with soap and water.  Remove old dressing, discard into plastic bag and place in trash.  Cleanse the wound with saline prior to applying a clean dressing. Do not use tissue or cotton balls. Do not scrub the wound. Pat dry using gauze.  Shower :  NO   Apply moisturizer to skin surrounding wound  Apply Puracol Plus  to the left foot wound.  Cover with abdominal pad  Secure with rolled gauze and tape.  Change dressing every other day (3x a week).    Wear your off loading shoe with the 3 layer of felt padding when walking.       Wound infection:  If you have signs of infection please call the wound center.  If the wound center is closed- please go to the Emergency department.  Some signs of infection:  fever, chills, increased redness, red streaks, increase in pain, increased drainage.  Drainage with an odor, Change in drainage color: white/milky/green/tan/yellow,  an increase in swelling, chest pain and/or shortness of breath.      Protein: Eat protein with each meal to promote healing.  Examples of protein are fish, meat, chicken, nuts, peanut butter, eggs, lentils, edamame or a protein shake.     Standing Status:   Future     Expiration Date:   12/4/2024

## 2024-12-04 ENCOUNTER — OFFICE VISIT (OUTPATIENT)
Dept: WOUND CARE | Facility: HOSPITAL | Age: 79
End: 2024-12-04
Payer: COMMERCIAL

## 2024-12-04 VITALS
DIASTOLIC BLOOD PRESSURE: 82 MMHG | HEART RATE: 60 BPM | TEMPERATURE: 97.2 F | SYSTOLIC BLOOD PRESSURE: 164 MMHG | RESPIRATION RATE: 15 BRPM

## 2024-12-04 DIAGNOSIS — M14.679 CHARCOT ARTHROPATHY OF MIDFOOT: ICD-10-CM

## 2024-12-04 DIAGNOSIS — L97.509 TYPE 2 DIABETES MELLITUS WITH FOOT ULCER, WITHOUT LONG-TERM CURRENT USE OF INSULIN (HCC): ICD-10-CM

## 2024-12-04 DIAGNOSIS — M14.672 CHARCOT JOINT OF LEFT FOOT: ICD-10-CM

## 2024-12-04 DIAGNOSIS — E11.621 TYPE 2 DIABETES MELLITUS WITH FOOT ULCER, WITHOUT LONG-TERM CURRENT USE OF INSULIN (HCC): ICD-10-CM

## 2024-12-04 DIAGNOSIS — L97.422 DIABETIC ULCER OF LEFT MIDFOOT ASSOCIATED WITH TYPE 2 DIABETES MELLITUS, WITH FAT LAYER EXPOSED (HCC): Primary | ICD-10-CM

## 2024-12-04 DIAGNOSIS — E11.621 DIABETIC ULCER OF LEFT MIDFOOT ASSOCIATED WITH TYPE 2 DIABETES MELLITUS, WITH FAT LAYER EXPOSED (HCC): Primary | ICD-10-CM

## 2024-12-04 PROCEDURE — 97597 DBRDMT OPN WND 1ST 20 CM/<: CPT | Performed by: STUDENT IN AN ORGANIZED HEALTH CARE EDUCATION/TRAINING PROGRAM

## 2024-12-04 RX ORDER — LIDOCAINE 40 MG/G
CREAM TOPICAL ONCE
Status: COMPLETED | OUTPATIENT
Start: 2024-12-04 | End: 2024-12-04

## 2024-12-04 RX ADMIN — LIDOCAINE: 40 CREAM TOPICAL at 15:14

## 2024-12-04 NOTE — PROGRESS NOTES
Wound Procedure Treatment Diabetic Ulcer Left Plantar    Performed by: Nohemy Gonzalez RN  Authorized by: Ollie White DPM    Associated wounds:   Wound 09/25/24 Diabetic Ulcer Plantar Left  Wound cleansed with:  NSS  Applied to periwound:  Moisture lotion  Applied primary dressing:  Non adherent contact layer  Applied secondary dressing:  Gauze  Dressing secured with:  Kerlix and Tape  Offloading device appllied:  Surgical shoe  Comments:  Xeroform

## 2024-12-04 NOTE — PATIENT INSTRUCTIONS
Orders Placed This Encounter   Procedures    Wound cleansing and dressings Diabetic Ulcer Left Plantar     LEFT FOOT WOUND:     Wash your hands with soap and water.  Remove old dressing, discard into plastic bag and place in trash.  Cleanse the wound with saline prior to applying a clean dressing. Do not use tissue or cotton balls. Do not scrub the wound. Pat dry using gauze.  Shower :  yes  Apply moisturizer to leg and foot.  Apply Xeroform to the left foot wound.  Cover with gauze.  Secure with rolled gauze and tape.  Change dressing three times per week.     Wear your off loading shoe with the 3 layer of felt padding when walking.        Wound infection:  If you have signs of infection please call the wound center.  If the wound center is closed- please go to the Emergency department.  Some signs of infection:  fever, chills, increased redness, red streaks, increase in pain, increased drainage.  Drainage with an odor, Change in drainage color: white/milky/green/tan/yellow,  an increase in swelling, chest pain and/or shortness of breath.      Protein: Eat protein with each meal to promote healing.  Examples of protein are fish, meat, chicken, nuts, peanut butter, eggs, lentils, edamame or a protein shake.     Standing Status:   Future     Expiration Date:   12/11/2024    Wound Procedure Treatment Diabetic Ulcer Left Plantar     This order was created via procedure documentation

## 2024-12-06 NOTE — PROGRESS NOTES
Patient ID: Stewart Flores is a 79 y.o. male Date of Birth 1945     My role is Foot, Ankle and Wound Specialist    PLAN:    -Educated patient on their condition.   -The patient's wound is not currently infected. We discussed the importance of recognizing systemic and local signs of infection and going directly to the emergency department should any of these occur  -Dressings: xeroform, dry sterile dressing, padded surgical shoe this week  -Discussed proper care of dressings, patient is not to get them wet at all. If the dressings do get wet, they must be removed and redressed.  -return to wound care in 1 week  -Patient and wife verbalize understanding of plan     Patient optimization:  -Vascular:              Arterial -lower extremity arterial duplex from 11/12/2024 reviewed: Diffuse disease noted bilaterally without significant stenosis.  Great toe pressures are within healing range bilaterally for diabetic patient.              Venous -no current evidence of lower extremity venous disease              Lymphatic -no evidence of lower extremity lymphatic disease     -MSK:              Pressure reduction -offloading surgical shoe              Deformity and possible correction -noted Charcot neuroarthropathy rocker-bottom foot deformity with prominence to the plantar medial aspect.  Patient is scheduled to undergo exostectomy of the left plantar medial foot.              Inserts/DME -patient will require custom molded diabetic sneakers once wound healing is achieved     -Neuro:              Neuropathy - We discussed checking feet daily for additional cuts, bruises, or wounds. We also discussed refraining from walking barefoot and wearing white socks in order to notice drainage     -Nutritional:              Dietary supplementation - Recommend high protein diet with Ricardo supplementation until wound is fully healed              Smoking cessation -patient does use nicotine products              Glycemic control  "- most recent hemoglobin A1c from 9/16/2024 8.4%.  Follow-up new hemoglobin A1c preoperatively     -Imaging/diagnositics:              X-ray - X-ray of left foot from 9/25/24 reviewed: Increased soft-tissue density and volume noted at the midfoot with joint effusion noted at the tarsometatarsal joint. Demineralization/sclerosis noted at the tarsometatarsal joint. Cindy-articular erosions noted at the tarsometatarsal joint. I note the foot in a general rocker-bottom position with collapse at the naviculocuneiform and tarsometatarsal joint. Talar-first metatarsal angle on lateral view -27. Calcaneus-cuboid angle on lateral view 1.2mm.               MRI - we will consider MRI in the future should the patient's wound worsen              Biopsy -no indication for biopsy at this time, should no healing be achieved within the next 3 to 4 months we will consider biopsy     -Infection management:              Wound culture -unable to obtain meaningful deep wound culture at this time              Antibiotic -no indication for antibiotic therapy at this time    Debridement   Wound 09/25/24 Diabetic Ulcer Plantar Left    Universal Protocol:  procedure performed by consultantConsent: Verbal consent obtained.  Risks and benefits: risks, benefits and alternatives were discussed  Consent given by: patient  Time out: Immediately prior to procedure a \"time out\" was called to verify the correct patient, procedure, equipment, support staff and site/side marked as required.  Patient understanding: patient states understanding of the procedure being performed  Patient identity confirmed: verbally with patient    Debridement Details  Performed by: physician  Debridement type: selective  Pain control: lidocaine 4%      Post-debridement measurements  Length (cm): 0.2  Width (cm): 0.2  Depth (cm): 0.1  Percent debrided: 90%  Surface Area (cm^2): 0.04  Area Debrided (cm^2): 0.04  Volume (cm^3): 0    Devitalized tissue debrided: biofilm and " callus  Instrument(s) utilized: blade  Bleeding: small  Hemostasis obtained with: pressure  Procedural pain (0-10): insensate  Post-procedural pain: insensate   Response to treatment: procedure was tolerated well     Wound Instructions    Orders Placed This Encounter   Procedures    Wound cleansing and dressings Diabetic Ulcer Left Plantar     LEFT FOOT WOUND:     Wash your hands with soap and water.  Remove old dressing, discard into plastic bag and place in trash.  Cleanse the wound with saline prior to applying a clean dressing. Do not use tissue or cotton balls. Do not scrub the wound. Pat dry using gauze.  Shower :  yes  Apply moisturizer to leg and foot.  Apply Xeroform to the left foot wound.  Cover with gauze.  Secure with rolled gauze and tape.  Change dressing three times per week.     Wear your off loading shoe with the 3 layer of felt padding when walking.        Wound infection:  If you have signs of infection please call the wound center.  If the wound center is closed- please go to the Emergency department.  Some signs of infection:  fever, chills, increased redness, red streaks, increase in pain, increased drainage.  Drainage with an odor, Change in drainage color: white/milky/green/tan/yellow,  an increase in swelling, chest pain and/or shortness of breath.      Protein: Eat protein with each meal to promote healing.  Examples of protein are fish, meat, chicken, nuts, peanut butter, eggs, lentils, edamame or a protein shake.     Standing Status:   Future     Expiration Date:   12/11/2024    Wound Procedure Treatment Diabetic Ulcer Left Plantar     This order was created via procedure documentation    Debridement     This order was created via procedure documentation     SUBJECTIVE:    Chief complaint  Left plantar foot ulcer    Consult-Stewart is a pleasant 79-year-old male with a past medical history significant for type 2 diabetes, peripheral arterial disease, Charcot neuroarthropathy.  He presents  today with a wound to the bottom of his left foot.  He does not recall how this wound started and states that it is not overall painful.  He states that he first noticed it about 1 month ago after a long day of walking.  In regard to his foot deformity, he states that he first noticed his foot becoming misshapen approximately 1 year ago.  He states that at the time the foot was red, hot, and swollen.  He denies any systemic signs of infection over the past week.    12/4/2024: Stewart is doing very well today, he denies any complications since his previous visit.  He denies any systemic signs of infection over the past week.         The following portions of the patient's history were reviewed and updated as appropriate:   There is no problem list on file for this patient.    Past Medical History:   Diagnosis Date    Arthritis     Chronic kidney disease     Diabetes mellitus (HCC)     History of wound infection ?2013    RIGHT LOWER LEG. WAS + FOR STAPH INFECTION AT THAT TIME    Hypertension     Shoulder abrasion     right side after a fall in Feb 2018     Past Surgical History:   Procedure Laterality Date    CHOLECYSTECTOMY      COLONOSCOPY      JOINT REPLACEMENT      KNEE ARTHROPLASTY Left 2014    WI EXC B9 LESION MRGN XCP SK TG S/N/H/F/G 1.1-2.0CM Left 7/30/2018    Procedure: EXCISIONAL BIOPSY BENIGN NEOPLASM OF SKIN EXTREMITY;  Surgeon: Julio Cesar Trejo Jr., DPM;  Location: [facility] OR;  Service: Podiatry    STEROID INJECTION SHOULDER Right     8 CERIVAL SPINE INJECTIONS    TONSILLECTOMY       Social History     Socioeconomic History    Marital status: /Civil Union     Spouse name: Not on file    Number of children: Not on file    Years of education: Not on file    Highest education level: Not on file   Occupational History    Not on file   Tobacco Use    Smoking status: Every Day     Types: Cigars    Smokeless tobacco: Never    Tobacco comments:     3-5 cigars a day   Substance and Sexual Activity    Alcohol  use: Yes     Alcohol/week: 5.0 standard drinks of alcohol     Types: 5 Cans of beer per week     Comment: DAY,SOMETIMES MORE PER PATIENT    Drug use: No    Sexual activity: Not on file   Other Topics Concern    Not on file   Social History Narrative    Not on file     Social Drivers of Health     Financial Resource Strain: Not on file   Food Insecurity: Not on file   Transportation Needs: Not on file   Physical Activity: Not on file   Stress: Not on file   Social Connections: Not on file   Intimate Partner Violence: Not on file   Housing Stability: Not on file        Current Outpatient Medications:     allopurinol (ZYLOPRIM) 100 mg tablet, Take 100 mg by mouth 2 (two) times a day, Disp: , Rfl:     aspirin (ECOTRIN LOW STRENGTH) 81 mg EC tablet, Take 81 mg by mouth daily, Disp: , Rfl:     calcitriol (ROCALTROL) 0.25 mcg capsule, , Disp: , Rfl:     dapagliflozin (Farxiga) 5 MG TABS, Take by mouth daily, Disp: , Rfl:     eplerenone (INSPRA) 50 MG tablet, Take 50 mg by mouth 2 (two) times a day Takes 2 tabs bid, Disp: , Rfl:     glipiZIDE (GLUCOTROL) 5 mg tablet, Take 5 mg by mouth every morning, Disp: , Rfl:     insulin lispro protamine-insulin lispro (HumaLOG 75/25) 100 units/mL, Inject under the skin 2 (two) times a day before meals 56units a.m. And 54 units hs, Disp: , Rfl:     Klor-Con M20 20 MEQ tablet, , Disp: , Rfl:     losartan (COZAAR) 50 mg tablet, , Disp: , Rfl:     metoprolol succinate (TOPROL-XL) 100 mg 24 hr tablet, , Disp: , Rfl:     omega-3-acid ethyl esters (LOVAZA) 1 g capsule, , Disp: , Rfl:     Potassium Chloride (KCL-20 PO), Take 10 mEq by mouth daily at bedtime (Patient not taking: Reported on 6/27/2023), Disp: , Rfl:     rosuvastatin (CRESTOR) 5 mg tablet, , Disp: , Rfl:     torsemide (DEMADEX) 20 mg tablet, Take 20 mg by mouth 2 (two) times a day 1 tablet in AM and 1/2 tablet pm, Disp: , Rfl:   No family history on file.   Review of Systems   Constitutional:  Negative for appetite change,  chills, diaphoresis, fatigue and fever.   HENT: Negative.     Gastrointestinal: Negative.    Skin:  Positive for color change and wound.       Allergies  Ibuprofen    OBJECTIVE:  /82   Pulse 60   Temp (!) 97.2 °F (36.2 °C)   Resp 15     Physical Exam  Constitutional:       Appearance: Normal appearance. He is not ill-appearing or diaphoretic.   HENT:      Head: Normocephalic and atraumatic.   Eyes:      General:         Right eye: No discharge.         Left eye: No discharge.   Pulmonary:      Effort: Pulmonary effort is normal. No respiratory distress.   Musculoskeletal:      Comments: Left foot deformity noted with hallux abductovalgus, pes planus/slight rocker-bottom deformity noted with plantar medial prominence at the level of the midfoot secondary to Charcot neuroarthropathy   Skin:     Capillary Refill: Capillary refill takes less than 2 seconds.      Comments: See wound assessment below   Neurological:      Mental Status: He is alert.      Sensory: Sensory deficit (7/10 locations felt with monofilament of the left foot) present.   Psychiatric:         Mood and Affect: Mood normal.           Wound 09/25/24 Diabetic Ulcer Plantar Left (Active)   Enter Spence score: Sepnce Grade 2: Deep ulcer extended to ligament, tendon, joint capsule, bone, or deep fascia without abscess or osteomyelitis (OM) 12/04/24 1512   Wound Image   12/04/24 1512   Wound Description Brown;Other (Comment) 12/04/24 1512   Cindy-wound Assessment Callus 12/04/24 1512   Wound Length (cm) 0.2 cm 12/04/24 1512   Wound Width (cm) 0.2 cm 12/04/24 1512   Wound Depth (cm) 0.1 cm 12/04/24 1512   Wound Surface Area (cm^2) 0.04 cm^2 12/04/24 1512   Wound Volume (cm^3) 0.004 cm^3 12/04/24 1512   Calculated Wound Volume (cm^3) 0 cm^3 12/04/24 1512   Change in Wound Size % 100 12/04/24 1512   Drainage Amount None 12/04/24 1512   Drainage Description Sanguineous 11/20/24 1307   Non-staged Wound Description Full thickness 12/04/24 1512   Dressing  Status Intact 12/04/24 1512           Wound 09/25/24 Diabetic Ulcer Plantar Left (Active)   Enter Spence score: Spence Grade 2: Deep ulcer extended to ligament, tendon, joint capsule, bone, or deep fascia without abscess or osteomyelitis (OM) 12/04/24 1512   Wound Image   12/04/24 1512   Wound Description Brown;Other (Comment) 12/04/24 1512   Cindy-wound Assessment Callus 12/04/24 1512   Wound Length (cm) 0.2 cm 12/04/24 1512   Wound Width (cm) 0.2 cm 12/04/24 1512   Wound Depth (cm) 0.1 cm 12/04/24 1512   Wound Surface Area (cm^2) 0.04 cm^2 12/04/24 1512   Wound Volume (cm^3) 0.004 cm^3 12/04/24 1512   Calculated Wound Volume (cm^3) 0 cm^3 12/04/24 1512   Change in Wound Size % 100 12/04/24 1512   Drainage Amount None 12/04/24 1512   Drainage Description Sanguineous 11/20/24 1307   Non-staged Wound Description Full thickness 12/04/24 1512   Dressing Status Intact 12/04/24 1512                         Diagnosis:  1. Diabetic ulcer of left midfoot associated with type 2 diabetes mellitus, with fat layer exposed (HCC)  -     lidocaine (LMX) 4 % cream  -     Wound cleansing and dressings Diabetic Ulcer Left Plantar; Future  -     Wound Procedure Treatment Diabetic Ulcer Left Plantar  2. Charcot joint of left foot  -     lidocaine (LMX) 4 % cream  -     Wound cleansing and dressings Diabetic Ulcer Left Plantar; Future  -     Wound Procedure Treatment Diabetic Ulcer Left Plantar  3. Type 2 diabetes mellitus with foot ulcer, without long-term current use of insulin (HCC)  -     lidocaine (LMX) 4 % cream  -     Wound cleansing and dressings Diabetic Ulcer Left Plantar; Future  -     Wound Procedure Treatment Diabetic Ulcer Left Plantar  4. Charcot arthropathy of midfoot      Diagnosis ICD-10-CM Associated Orders   1. Diabetic ulcer of left midfoot associated with type 2 diabetes mellitus, with fat layer exposed (HCC)  E11.621 lidocaine (LMX) 4 % cream    L97.422 Wound cleansing and dressings Diabetic Ulcer Left Plantar      Wound Procedure Treatment Diabetic Ulcer Left Plantar      2. Charcot joint of left foot  M14.672 lidocaine (LMX) 4 % cream     Wound cleansing and dressings Diabetic Ulcer Left Plantar     Wound Procedure Treatment Diabetic Ulcer Left Plantar      3. Type 2 diabetes mellitus with foot ulcer, without long-term current use of insulin ([facility])  E11.621 lidocaine (LMX) 4 % cream    L97.509 Wound cleansing and dressings Diabetic Ulcer Left Plantar     Wound Procedure Treatment Diabetic Ulcer Left Plantar      4. Charcot arthropathy of midfoot  M14.679            ASSESSMENT:    1) left plantar medial foot diabetic ulceration to the level of subcutaneous tissue, Spence 2  2) type 2 diabetes with peripheral neuropathy  3) Charcot neuroarthropathy of the left midfoot

## 2024-12-11 ENCOUNTER — OFFICE VISIT (OUTPATIENT)
Dept: WOUND CARE | Facility: HOSPITAL | Age: 79
End: 2024-12-11
Payer: COMMERCIAL

## 2024-12-11 VITALS
HEART RATE: 64 BPM | SYSTOLIC BLOOD PRESSURE: 169 MMHG | RESPIRATION RATE: 18 BRPM | TEMPERATURE: 98 F | DIASTOLIC BLOOD PRESSURE: 85 MMHG

## 2024-12-11 DIAGNOSIS — L97.422 DIABETIC ULCER OF LEFT MIDFOOT ASSOCIATED WITH TYPE 2 DIABETES MELLITUS, WITH FAT LAYER EXPOSED (HCC): Primary | ICD-10-CM

## 2024-12-11 DIAGNOSIS — M14.672 CHARCOT JOINT OF LEFT FOOT: ICD-10-CM

## 2024-12-11 DIAGNOSIS — E11.621 TYPE 2 DIABETES MELLITUS WITH FOOT ULCER, WITHOUT LONG-TERM CURRENT USE OF INSULIN (HCC): ICD-10-CM

## 2024-12-11 DIAGNOSIS — E11.621 DIABETIC ULCER OF LEFT MIDFOOT ASSOCIATED WITH TYPE 2 DIABETES MELLITUS, WITH FAT LAYER EXPOSED (HCC): Primary | ICD-10-CM

## 2024-12-11 DIAGNOSIS — L97.509 TYPE 2 DIABETES MELLITUS WITH FOOT ULCER, WITHOUT LONG-TERM CURRENT USE OF INSULIN (HCC): ICD-10-CM

## 2024-12-11 PROCEDURE — 99212 OFFICE O/P EST SF 10 MIN: CPT | Performed by: STUDENT IN AN ORGANIZED HEALTH CARE EDUCATION/TRAINING PROGRAM

## 2024-12-11 PROCEDURE — 99213 OFFICE O/P EST LOW 20 MIN: CPT | Performed by: STUDENT IN AN ORGANIZED HEALTH CARE EDUCATION/TRAINING PROGRAM

## 2024-12-11 NOTE — PROGRESS NOTES
Wound Procedure Treatment Diabetic Ulcer Left Plantar    Performed by: Charlotte Shoemaker LPN  Authorized by: Ollie White DPM    Associated wounds:   Wound 09/25/24 Diabetic Ulcer Plantar Left  Applied primary dressing:  Silicone bordered foam

## 2024-12-11 NOTE — PATIENT INSTRUCTIONS
Orders Placed This Encounter   Procedures    Wound cleansing and dressings     LEFT FOOT WOUND:  Apply border foam for the next week      Wear your off loading shoe with the 3 layer of felt padding when walking.        Wound infection:  If you have signs of infection please call the wound center.  If the wound center is closed- please go to the Emergency department.  Some signs of infection:  fever, chills, increased redness, red streaks, increase in pain, increased drainage.  Drainage with an odor, Change in drainage color: white/milky/green/tan/yellow,  an increase in swelling, chest pain and/or shortness of breath.      Protein: Eat protein with each meal to promote healing.  Examples of protein are fish, meat, chicken, nuts, peanut butter, eggs, lentils, edamame or a protein shake.     Standing Status:   Future     Expiration Date:   12/18/2024

## 2024-12-12 ENCOUNTER — TELEPHONE (OUTPATIENT)
Age: 79
End: 2024-12-12

## 2024-12-15 NOTE — PROGRESS NOTES
Patient ID: Stewart Flores is a 79 y.o. male Date of Birth 1945     My role is Foot, Ankle and Wound Specialist    PLAN:  -The patient and I discussed their bilateral feet  -All previous areas of ulceration are fully healed at this time without any drainage or local signs of infection  -The patient should continue to offload their bilateral feet as if wounds are present, especially for the first few weeks after wound healing as skin is fragile  -Continue to check feet daily for ulcerations or signs of infection  -Patient should follow-up with me at my office in 2 weeks for follow-up  -Patient is discharged from the wound care center at this time, please call to make an appointment should any new wound care concerns arise     Wound Instructions    Orders Placed This Encounter   Procedures    Wound cleansing and dressings     LEFT FOOT WOUND:  Apply border foam for the next week      Wear your off loading shoe with the 3 layer of felt padding when walking.        Wound infection:  If you have signs of infection please call the wound center.  If the wound center is closed- please go to the Emergency department.  Some signs of infection:  fever, chills, increased redness, red streaks, increase in pain, increased drainage.  Drainage with an odor, Change in drainage color: white/milky/green/tan/yellow,  an increase in swelling, chest pain and/or shortness of breath.      Protein: Eat protein with each meal to promote healing.  Examples of protein are fish, meat, chicken, nuts, peanut butter, eggs, lentils, edamame or a protein shake.     Standing Status:   Future     Expiration Date:   12/18/2024    Wound Procedure Treatment Diabetic Ulcer Left Plantar     This order was created via procedure documentation     SUBJECTIVE:    Chief complaint  Left foot wound    Consult-Stewart is a pleasant 79-year-old male with a past medical history significant for type 2 diabetes, peripheral arterial disease, Charcot  neuroarthropathy.  He presents today with a wound to the bottom of his left foot.  He does not recall how this wound started and states that it is not overall painful.  He states that he first noticed it about 1 month ago after a long day of walking.  In regard to his foot deformity, he states that he first noticed his foot becoming misshapen approximately 1 year ago.  He states that at the time the foot was red, hot, and swollen.  He denies any systemic signs of infection over the past week.    12/11/2024: Stewart is doing very well today, he denies any complications since his previous visit.  He denies any systemic signs of infection over the past week. He tolerated his cast well        The following portions of the patient's history were reviewed and updated as appropriate:   There is no problem list on file for this patient.    Past Medical History:   Diagnosis Date    Arthritis     Chronic kidney disease     Diabetes mellitus (HCC)     History of wound infection ?2013    RIGHT LOWER LEG. WAS + FOR STAPH INFECTION AT THAT TIME    Hypertension     Shoulder abrasion     right side after a fall in Feb 2018     Past Surgical History:   Procedure Laterality Date    CHOLECYSTECTOMY      COLONOSCOPY      JOINT REPLACEMENT      KNEE ARTHROPLASTY Left 2014    DC EXC B9 LESION MRGN XCP SK TG S/N/H/F/G 1.1-2.0CM Left 7/30/2018    Procedure: EXCISIONAL BIOPSY BENIGN NEOPLASM OF SKIN EXTREMITY;  Surgeon: Julio Cesar Trejo Jr., DPM;  Location: [facility];  Service: Podiatry    STEROID INJECTION SHOULDER Right     8 CERIVAL SPINE INJECTIONS    TONSILLECTOMY       Social History     Socioeconomic History    Marital status: /Civil Union     Spouse name: Not on file    Number of children: Not on file    Years of education: Not on file    Highest education level: Not on file   Occupational History    Not on file   Tobacco Use    Smoking status: Every Day     Types: Cigars    Smokeless tobacco: Never    Tobacco comments:      3-5 cigars a day   Substance and Sexual Activity    Alcohol use: Yes     Alcohol/week: 5.0 standard drinks of alcohol     Types: 5 Cans of beer per week     Comment: DAY,SOMETIMES MORE PER PATIENT    Drug use: No    Sexual activity: Not on file   Other Topics Concern    Not on file   Social History Narrative    Not on file     Social Drivers of Health     Financial Resource Strain: Not on file   Food Insecurity: Not on file   Transportation Needs: Not on file   Physical Activity: Not on file   Stress: Not on file   Social Connections: Not on file   Intimate Partner Violence: Not on file   Housing Stability: Not on file        Current Outpatient Medications:     allopurinol (ZYLOPRIM) 100 mg tablet, Take 100 mg by mouth 2 (two) times a day, Disp: , Rfl:     aspirin (ECOTRIN LOW STRENGTH) 81 mg EC tablet, Take 81 mg by mouth daily, Disp: , Rfl:     calcitriol (ROCALTROL) 0.25 mcg capsule, , Disp: , Rfl:     dapagliflozin (Farxiga) 5 MG TABS, Take by mouth daily, Disp: , Rfl:     eplerenone (INSPRA) 50 MG tablet, Take 50 mg by mouth 2 (two) times a day Takes 2 tabs bid, Disp: , Rfl:     glipiZIDE (GLUCOTROL) 5 mg tablet, Take 5 mg by mouth every morning, Disp: , Rfl:     insulin lispro protamine-insulin lispro (HumaLOG 75/25) 100 units/mL, Inject under the skin 2 (two) times a day before meals 56units a.m. And 54 units hs, Disp: , Rfl:     Klor-Con M20 20 MEQ tablet, , Disp: , Rfl:     losartan (COZAAR) 50 mg tablet, , Disp: , Rfl:     metoprolol succinate (TOPROL-XL) 100 mg 24 hr tablet, , Disp: , Rfl:     omega-3-acid ethyl esters (LOVAZA) 1 g capsule, , Disp: , Rfl:     Potassium Chloride (KCL-20 PO), Take 10 mEq by mouth daily at bedtime (Patient not taking: Reported on 6/27/2023), Disp: , Rfl:     rosuvastatin (CRESTOR) 5 mg tablet, , Disp: , Rfl:     torsemide (DEMADEX) 20 mg tablet, Take 20 mg by mouth 2 (two) times a day 1 tablet in AM and 1/2 tablet pm, Disp: , Rfl:   No family history on file.   Review of  Systems   Constitutional:  Negative for appetite change, chills, diaphoresis, fatigue and fever.   HENT: Negative.     Gastrointestinal: Negative.    Skin:  Positive for color change and wound.       Allergies  Ibuprofen    OBJECTIVE:  /85   Pulse 64   Temp 98 °F (36.7 °C)   Resp 18     Physical Exam  Constitutional:       Appearance: Normal appearance. He is not ill-appearing or diaphoretic.   HENT:      Head: Normocephalic and atraumatic.   Eyes:      General:         Right eye: No discharge.         Left eye: No discharge.   Pulmonary:      Effort: Pulmonary effort is normal. No respiratory distress.   Musculoskeletal:      Comments: Left foot deformity noted with hallux abductovalgus, pes planus/slight rocker-bottom deformity noted with plantar medial prominence at the level of the midfoot secondary to Charcot neuroarthropathy   Skin:     Capillary Refill: Capillary refill takes less than 2 seconds.      Comments: See wound assessment below   Neurological:      Mental Status: He is alert.      Sensory: Sensory deficit (7/10 locations felt with monofilament of the left foot) present.   Psychiatric:         Mood and Affect: Mood normal.                                           Diagnosis:  1. Diabetic ulcer of left midfoot associated with type 2 diabetes mellitus, with fat layer exposed (HCC)  -     Wound cleansing and dressings; Future  -     Wound Procedure Treatment Diabetic Ulcer Left Plantar  2. Charcot joint of left foot  -     Wound cleansing and dressings; Future  -     Wound Procedure Treatment Diabetic Ulcer Left Plantar  3. Type 2 diabetes mellitus with foot ulcer, without long-term current use of insulin (HCC)  -     Wound cleansing and dressings; Future  -     Wound Procedure Treatment Diabetic Ulcer Left Plantar      Diagnosis ICD-10-CM Associated Orders   1. Diabetic ulcer of left midfoot associated with type 2 diabetes mellitus, with fat layer exposed (HCC)  E11.621 Wound cleansing and  dressings    L97.422 Wound Procedure Treatment Diabetic Ulcer Left Plantar      2. Charcot joint of left foot  M14.672 Wound cleansing and dressings     Wound Procedure Treatment Diabetic Ulcer Left Plantar      3. Type 2 diabetes mellitus with foot ulcer, without long-term current use of insulin ([facility])  E11.621 Wound cleansing and dressings    L97.509 Wound Procedure Treatment Diabetic Ulcer Left Plantar           ASSESSMENT:    1) left plantar medial foot diabetic ulceration to the level of subcutaneous tissue, fully healed today  2) type 2 diabetes with peripheral neuropathy  3) Charcot neuroarthropathy of the left midfoot

## 2024-12-23 ENCOUNTER — TELEPHONE (OUTPATIENT)
Age: 79
End: 2024-12-23

## 2024-12-23 NOTE — TELEPHONE ENCOUNTER
Caller: Rodolfo -spouse     Doctor: Christopher    Reason for call: Patient would like to know if he should start wearing his orthotics with inserts now or wait for his appointment on 1/2/2025 to wear them ?     Please advise     Call back#: [phone]

## 2024-12-30 ENCOUNTER — APPOINTMENT (OUTPATIENT)
Dept: LAB | Facility: HOSPITAL | Age: 79
End: 2024-12-30
Attending: STUDENT IN AN ORGANIZED HEALTH CARE EDUCATION/TRAINING PROGRAM
Payer: COMMERCIAL

## 2024-12-30 ENCOUNTER — HOSPITAL ENCOUNTER (OUTPATIENT)
Dept: RADIOLOGY | Facility: HOSPITAL | Age: 79
Discharge: HOME | End: 2024-12-30
Payer: COMMERCIAL

## 2024-12-30 DIAGNOSIS — E11.621 TYPE 2 DIABETES MELLITUS WITH FOOT ULCER, WITHOUT LONG-TERM CURRENT USE OF INSULIN (HCC): ICD-10-CM

## 2024-12-30 DIAGNOSIS — L97.509 TYPE 2 DIABETES MELLITUS WITH FOOT ULCER, WITHOUT LONG-TERM CURRENT USE OF INSULIN (HCC): ICD-10-CM

## 2024-12-30 DIAGNOSIS — Z01.818 PREOPERATIVE CLEARANCE: ICD-10-CM

## 2024-12-30 LAB
ALBUMIN SERPL BCG-MCNC: 4.3 G/DL (ref 3.5–5)
ALP SERPL-CCNC: 52 U/L (ref 34–104)
ALT SERPL W P-5'-P-CCNC: 19 U/L (ref 7–52)
ANION GAP SERPL CALCULATED.3IONS-SCNC: 10 MMOL/L (ref 4–13)
AST SERPL W P-5'-P-CCNC: 22 U/L (ref 13–39)
ATRIAL RATE: 57 BPM
BASOPHILS # BLD AUTO: 0.09 THOUSANDS/ΜL (ref 0–0.1)
BASOPHILS NFR BLD AUTO: 1 % (ref 0–1)
BILIRUB SERPL-MCNC: 0.59 MG/DL (ref 0.2–1)
BUN SERPL-MCNC: 26 MG/DL (ref 5–25)
CALCIUM SERPL-MCNC: 9.5 MG/DL (ref 8.4–10.2)
CHLORIDE SERPL-SCNC: 105 MMOL/L (ref 96–108)
CO2 SERPL-SCNC: 24 MMOL/L (ref 21–32)
CREAT SERPL-MCNC: 1.63 MG/DL (ref 0.6–1.3)
EOSINOPHIL # BLD AUTO: 0.49 THOUSAND/ΜL (ref 0–0.61)
EOSINOPHIL NFR BLD AUTO: 5 % (ref 0–6)
ERYTHROCYTE [DISTWIDTH] IN BLOOD BY AUTOMATED COUNT: 14.3 % (ref 11.6–15.1)
EST. AVERAGE GLUCOSE BLD GHB EST-MCNC: 189 MG/DL
GFR SERPL CREATININE-BSD FRML MDRD: 39 ML/MIN/1.73SQ M
GLUCOSE P FAST SERPL-MCNC: 145 MG/DL (ref 65–99)
HBA1C MFR BLD: 8.2 %
HCT VFR BLD AUTO: 45.4 % (ref 36.5–49.3)
HGB BLD-MCNC: 15.2 G/DL (ref 12–17)
IMM GRANULOCYTES # BLD AUTO: 0.05 THOUSAND/UL (ref 0–0.2)
IMM GRANULOCYTES NFR BLD AUTO: 1 % (ref 0–2)
LYMPHOCYTES # BLD AUTO: 2.52 THOUSANDS/ΜL (ref 0.6–4.47)
LYMPHOCYTES NFR BLD AUTO: 25 % (ref 14–44)
MCH RBC QN AUTO: 31.4 PG (ref 26.8–34.3)
MCHC RBC AUTO-ENTMCNC: 33.5 G/DL (ref 31.4–37.4)
MCV RBC AUTO: 94 FL (ref 82–98)
MONOCYTES # BLD AUTO: 0.7 THOUSAND/ΜL (ref 0.17–1.22)
MONOCYTES NFR BLD AUTO: 7 % (ref 4–12)
NEUTROPHILS # BLD AUTO: 6.15 THOUSANDS/ΜL (ref 1.85–7.62)
NEUTS SEG NFR BLD AUTO: 61 % (ref 43–75)
NRBC BLD AUTO-RTO: 0 /100 WBCS
P AXIS: 86 DEGREES
PLATELET # BLD AUTO: 218 THOUSANDS/UL (ref 149–390)
PMV BLD AUTO: 9.7 FL (ref 8.9–12.7)
POTASSIUM SERPL-SCNC: 4 MMOL/L (ref 3.5–5.3)
PR INTERVAL: 206 MS
PROT SERPL-MCNC: 7 G/DL (ref 6.4–8.4)
QRS AXIS: -61 DEGREES
QRSD INTERVAL: 126 MS
QT INTERVAL: 422 MS
QTC INTERVAL: 411 MS
RBC # BLD AUTO: 4.84 MILLION/UL (ref 3.88–5.62)
SODIUM SERPL-SCNC: 139 MMOL/L (ref 135–147)
T WAVE AXIS: 17 DEGREES
VENTRICULAR RATE: 57 BPM
WBC # BLD AUTO: 10 THOUSAND/UL (ref 4.31–10.16)

## 2024-12-30 PROCEDURE — 80053 COMPREHEN METABOLIC PANEL: CPT

## 2024-12-30 PROCEDURE — 93010 ELECTROCARDIOGRAM REPORT: CPT | Performed by: INTERNAL MEDICINE

## 2024-12-30 PROCEDURE — 36415 COLL VENOUS BLD VENIPUNCTURE: CPT

## 2024-12-30 PROCEDURE — 85025 COMPLETE CBC W/AUTO DIFF WBC: CPT

## 2024-12-30 PROCEDURE — 71046 X-RAY EXAM CHEST 2 VIEWS: CPT

## 2024-12-30 PROCEDURE — 83036 HEMOGLOBIN GLYCOSYLATED A1C: CPT

## 2025-01-02 ENCOUNTER — TELEPHONE (OUTPATIENT)
Dept: OBGYN CLINIC | Facility: CLINIC | Age: 80
End: 2025-01-02

## 2025-01-02 ENCOUNTER — OFFICE VISIT (OUTPATIENT)
Age: 80
End: 2025-01-02
Payer: COMMERCIAL

## 2025-01-02 VITALS — BODY MASS INDEX: 36.37 KG/M2 | WEIGHT: 240 LBS | HEIGHT: 68 IN

## 2025-01-02 DIAGNOSIS — M14.672 CHARCOT JOINT OF LEFT FOOT: Primary | ICD-10-CM

## 2025-01-02 DIAGNOSIS — M21.962 FOOT DEFORMITY, ACQUIRED, LEFT: ICD-10-CM

## 2025-01-02 DIAGNOSIS — E11.610 TYPE 2 DIABETES MELLITUS WITH DIABETIC NEUROPATHIC ARTHROPATHY, UNSPECIFIED WHETHER LONG TERM INSULIN USE (HCC): ICD-10-CM

## 2025-01-02 PROCEDURE — 99213 OFFICE O/P EST LOW 20 MIN: CPT | Performed by: STUDENT IN AN ORGANIZED HEALTH CARE EDUCATION/TRAINING PROGRAM

## 2025-01-02 NOTE — PROGRESS NOTES
[facility] Podiatric Medicine and Surgery Office Visit    ASSESSMENT     Diagnoses and all orders for this visit:    Charcot joint of left foot    Foot deformity, acquired, left    Type 2 diabetes mellitus with diabetic neuropathic arthropathy, unspecified whether long term insulin use (HCC)         Problem List Items Addressed This Visit    None  Visit Diagnoses         Charcot joint of left foot    -  Primary      Foot deformity, acquired, left          Type 2 diabetes mellitus with diabetic neuropathic arthropathy, unspecified whether long term insulin use (HCC)              PLAN  Manan and I discussed his left foot.  It remains healed since his last wound care appointment.  We reviewed his hemoglobin A1c from 12/30/2024 which was 8.2%.  This is slightly higher than I would like it to be for an elective procedure, however I let him know that I would still be willing to perform this procedure in hopes of preventing future ulcerations of his left foot as long as he understands and acknowledges that there are increased risks of incision site nonhealing as well as infection to the soft tissues and underlying bone with a hemoglobin A1c above 8%.  He does seem to understand this, his wife seems to understand as well.  He would like to move forward with his procedure.  I also let him know that there were abnormalities on his EKG, he is scheduled to see his family doctor on 1/6/2025 and will discuss the need for cardiac clearance at this time.  He will return to clinic as scheduled postoperatively.    SUBJECTIVE    Chief Complaint:  Left foot Charcot neuroarthropathy with history of ulceration     Patient ID: Stewart William is a pleasant 79 year old male who presents today for his left foot. He was being seen at wound care and was told to follow up in the office. He states that the wound on his foot does not bother him all the time only every once in awhile. He was not sure whether he should have it covered  "with a band-aid so he has not had any bandages on it since his last visit at wound care.       The following portions of the patient's history were reviewed and updated as appropriate: allergies, current medications, past family history, past medical history, past social history, past surgical history and problem list.    Review of Systems   Constitutional:  Negative for appetite change, chills, diaphoresis, fatigue and fever.   HENT: Negative.     Gastrointestinal: Negative.    Skin:  Negative for color change and wound.         OBJECTIVE      Ht 5' 8\" (1.727 m)   Wt 109 kg (240 lb)   BMI 36.49 kg/m²        Physical Exam  Constitutional:       Appearance: Normal appearance. He is not ill-appearing or diaphoretic.   HENT:      Head: Normocephalic and atraumatic.   Eyes:      General:         Right eye: No discharge.         Left eye: No discharge.   Pulmonary:      Effort: Pulmonary effort is normal. No respiratory distress.   Musculoskeletal:      Comments: Left foot deformity noted with hallux abductovalgus, pes planus/slight rocker-bottom deformity noted with plantar medial prominence at the level of the midfoot secondary to Charcot neuroarthropathy.  On standing exam it is noted that the forefoot has an severe abduction as well as dorsiflexion compared to the rear foot at the left foot.  The ankle joint remains rectus as well as the subtalar joint.   Skin:     Capillary Refill: Capillary refill takes less than 2 seconds.      Comments: Left foot wound remains fully healed at this time without any local signs of infection or open areas   Neurological:      Mental Status: He is alert.      Sensory: Sensory deficit (7/10 locations felt with monofilament of the left foot) present.   Psychiatric:         Mood and Affect: Mood normal.               "

## 2025-01-02 NOTE — TELEPHONE ENCOUNTER
Spoke with pt's spouse regarding H&P/MC appt with PCP.. She stated appt scheduled 1/6. Informed will be sending EKG and Bloodwork results via fax to PCP.  Pt may need Cardiac Clearance prior to sx.    Faxed all results to PCP, Brady Reilly PA-C [facility], [phone]

## 2025-01-09 ENCOUNTER — ANESTHESIA EVENT (OUTPATIENT)
Dept: PERIOP | Facility: HOSPITAL | Age: 80
End: 2025-01-09
Payer: COMMERCIAL

## 2025-01-09 NOTE — TELEPHONE ENCOUNTER
Received PCP MC, pt freed snot require further cardiac testing and is cleared for surgery.  MC scanned in chart

## 2025-01-14 NOTE — PRE-PROCEDURE INSTRUCTIONS
Pre-Surgery Instructions:   Medication Instructions    allopurinol (ZYLOPRIM) 100 mg tablet Hold day of surgery.    aspirin (ECOTRIN LOW STRENGTH) 81 mg EC tablet Hold day of surgery.    Continuous Glucose Sensor (FreeStyle [place] 2 Sensor) MISC Hold day of surgery.    dapagliflozin (Farxiga) 5 MG TABS Stop taking 4 days prior to surgery.    eplerenone (INSPRA) 50 MG tablet Take day of surgery.    glipiZIDE (GLUCOTROL) 5 mg tablet Hold day of surgery.    insulin lispro protamine-insulin lispro (HumaLOG 75/25) 100 units/mL Hold day of surgery.    losartan (COZAAR) 50 mg tablet Hold day of surgery.    metoprolol succinate (TOPROL-XL) 100 mg 24 hr tablet Take night before surgery    omega-3-acid ethyl esters (LOVAZA) 1 g capsule Stop taking 7 days prior to surgery.    rosuvastatin (CRESTOR) 5 mg tablet Take night before surgery    torsemide (DEMADEX) 20 mg tablet Hold day of surgery.    LD Farxiga 01/16/25. Medication instructions for day surgery reviewed. Please use only a sip of water to take your instructed medications. Avoid all over the counter vitamins, supplements and NSAIDS for one week prior to surgery per anesthesia guidelines. Tylenol is ok to take as needed.     You will receive a call one business day prior to surgery with an arrival time and hospital directions. If your surgery is scheduled on a Monday, the hospital will be calling you on the Friday prior to your surgery. If you have not heard from anyone by 8pm, please call the hospital supervisor through the hospital  at [phone]. ([place] [phone] or [place] [phone]).    Do not eat or drink anything after midnight the night before your surgery, including candy, mints, lifesavers, or chewing gum. Do not drink alcohol 24hrs before your surgery. Try not to smoke at least 24hrs before your surgery.       Follow the pre surgery showering instructions as listed in the “My Surgical Experience Booklet” or otherwise provided by your  surgeon's office. Do not use a blade to shave the surgical area 1 week before surgery. It is okay to use a clean electric clippers up to 24 hours before surgery. Do not apply any lotions, creams, including makeup, cologne, deodorant, or perfumes after showering on the day of your surgery. Do not use dry shampoo, hair spray, hair gel, or any type of hair products.     No contact lenses, eye make-up, or artificial eyelashes. Remove nail polish, including gel polish, and any artificial, gel, or acrylic nails if possible. Remove all jewelry including rings and body piercing jewelry.     Wear causal clothing that is easy to take on and off. Consider your type of surgery.    Keep any valuables, jewelry, piercings at home. Please bring any specially ordered equipment (sling, braces) if indicated.    Arrange for a responsible person to drive you to and from the hospital on the day of your surgery. Please confirm the visitor policy for the day of your procedure when you receive your phone call with an arrival time.     Call the surgeon's office with any new illnesses, exposures, or additional questions prior to surgery.    Please reference your “My Surgical Experience Booklet” for additional information to prepare for your upcoming surgery.

## 2025-01-20 PROBLEM — N18.9 CHRONIC KIDNEY DISEASE: Status: ACTIVE | Noted: 2025-01-20

## 2025-01-20 PROBLEM — E11.9 DIABETES MELLITUS, TYPE 2 (HCC): Status: ACTIVE | Noted: 2025-01-20

## 2025-01-20 PROBLEM — IMO0001 SMOKING: Status: ACTIVE | Noted: 2025-01-20

## 2025-01-20 PROBLEM — F17.200 SMOKING: Status: ACTIVE | Noted: 2025-01-20

## 2025-01-20 PROBLEM — I10 HTN (HYPERTENSION): Status: ACTIVE | Noted: 2025-01-20

## 2025-01-20 PROBLEM — M10.9 GOUT: Status: ACTIVE | Noted: 2025-01-20

## 2025-01-20 PROBLEM — E78.5 HYPERLIPIDEMIA: Status: ACTIVE | Noted: 2025-01-20

## 2025-01-20 NOTE — ANESTHESIA PREPROCEDURE EVALUATION
Procedure:  EXCISION EXOSTOSIS/saucerization left foot (Left: Foot)    Relevant Problems   CARDIO   (+) HTN (hypertension)   (+) Hyperlipidemia      ENDO   (+) Diabetes mellitus, type 2 (HCC)      /RENAL   (+) Chronic kidney disease      MUSCULOSKELETAL   (+) Gout      PULMONARY   (+) Smoking - cigars daily      Orthopedic/Musculoskeletal   (+) S/P total knee arthroplasty, left        Physical Exam    Airway    Mallampati score: III  TM Distance: >3 FB  Neck ROM: full     Dental    lower dentures and upper dentures    Cardiovascular  Rhythm: regular, Rate: normal    Pulmonary   Breath sounds clear to auscultation    Other Findings        Anesthesia Plan  ASA Score- 3     Anesthesia Type- general with ASA Monitors.         Additional Monitors:     Airway Plan: LMA.           Plan Factors-    Chart reviewed.        Patient is a current smoker.  Patient instructed to abstain from smoking on day of procedure. Patient smoked on day of surgery.            Induction- intravenous.    Postoperative Plan- Plan for postoperative opioid use.     Perioperative Resuscitation Plan - Level 1 - Full Code.       Informed Consent- Anesthetic plan and risks discussed with patient.  I personally reviewed this patient with the CRNA. Discussed and agreed on the Anesthesia Plan with the CRNA..      NPO Status:  No vitals data found for the desired time range.

## 2025-01-21 ENCOUNTER — HOSPITAL ENCOUNTER (OUTPATIENT)
Facility: HOSPITAL | Age: 80
Setting detail: OUTPATIENT SURGERY
Discharge: HOME | End: 2025-01-21
Attending: STUDENT IN AN ORGANIZED HEALTH CARE EDUCATION/TRAINING PROGRAM | Admitting: STUDENT IN AN ORGANIZED HEALTH CARE EDUCATION/TRAINING PROGRAM
Payer: COMMERCIAL

## 2025-01-21 ENCOUNTER — APPOINTMENT (OUTPATIENT)
Dept: RADIOLOGY | Facility: HOSPITAL | Age: 80
End: 2025-01-21
Payer: COMMERCIAL

## 2025-01-21 ENCOUNTER — ANESTHESIA (OUTPATIENT)
Dept: PERIOP | Facility: HOSPITAL | Age: 80
End: 2025-01-21
Payer: COMMERCIAL

## 2025-01-21 VITALS
DIASTOLIC BLOOD PRESSURE: 59 MMHG | WEIGHT: 235.23 LBS | TEMPERATURE: 98.3 F | HEART RATE: 60 BPM | SYSTOLIC BLOOD PRESSURE: 123 MMHG | OXYGEN SATURATION: 97 % | BODY MASS INDEX: 35.65 KG/M2 | HEIGHT: 68 IN | RESPIRATION RATE: 18 BRPM

## 2025-01-21 PROBLEM — Z96.652 S/P TOTAL KNEE ARTHROPLASTY, LEFT: Status: ACTIVE | Noted: 2025-01-21

## 2025-01-21 PROCEDURE — 99024 POSTOP FOLLOW-UP VISIT: CPT | Performed by: STUDENT IN AN ORGANIZED HEALTH CARE EDUCATION/TRAINING PROGRAM

## 2025-01-21 PROCEDURE — 73630 X-RAY EXAM OF FOOT: CPT

## 2025-01-21 PROCEDURE — 28122 PARTIAL REMOVAL OF FOOT BONE: CPT | Performed by: STUDENT IN AN ORGANIZED HEALTH CARE EDUCATION/TRAINING PROGRAM

## 2025-01-21 PROCEDURE — NC001 PR NO CHARGE: Performed by: STUDENT IN AN ORGANIZED HEALTH CARE EDUCATION/TRAINING PROGRAM

## 2025-01-21 RX ORDER — FENTANYL CITRATE/PF 50 MCG/ML
25 SYRINGE (ML) INJECTION
Status: DISCONTINUED | OUTPATIENT
Start: 2025-01-21 | End: 2025-01-21 | Stop reason: HOSPADM

## 2025-01-21 RX ORDER — CEFAZOLIN SODIUM 2 G/50ML
2000 SOLUTION INTRAVENOUS ONCE
Status: COMPLETED | OUTPATIENT
Start: 2025-01-21 | End: 2025-01-21

## 2025-01-21 RX ORDER — ONDANSETRON 2 MG/ML
4 INJECTION INTRAMUSCULAR; INTRAVENOUS ONCE
Status: DISCONTINUED | OUTPATIENT
Start: 2025-01-21 | End: 2025-01-21 | Stop reason: HOSPADM

## 2025-01-21 RX ORDER — SODIUM CHLORIDE, SODIUM LACTATE, POTASSIUM CHLORIDE, CALCIUM CHLORIDE 600; 310; 30; 20 MG/100ML; MG/100ML; MG/100ML; MG/100ML
75 INJECTION, SOLUTION INTRAVENOUS CONTINUOUS
Status: DISCONTINUED | OUTPATIENT
Start: 2025-01-21 | End: 2025-01-21 | Stop reason: HOSPADM

## 2025-01-21 RX ORDER — PROPOFOL 10 MG/ML
INJECTION, EMULSION INTRAVENOUS AS NEEDED
Status: DISCONTINUED | OUTPATIENT
Start: 2025-01-21 | End: 2025-01-21

## 2025-01-21 RX ORDER — MAGNESIUM HYDROXIDE 1200 MG/15ML
LIQUID ORAL AS NEEDED
Status: DISCONTINUED | OUTPATIENT
Start: 2025-01-21 | End: 2025-01-21 | Stop reason: HOSPADM

## 2025-01-21 RX ORDER — PHENYLEPHRINE HCL IN 0.9% NACL 1 MG/10 ML
SYRINGE (ML) INTRAVENOUS AS NEEDED
Status: DISCONTINUED | OUTPATIENT
Start: 2025-01-21 | End: 2025-01-21

## 2025-01-21 RX ORDER — ONDANSETRON 2 MG/ML
INJECTION INTRAMUSCULAR; INTRAVENOUS AS NEEDED
Status: DISCONTINUED | OUTPATIENT
Start: 2025-01-21 | End: 2025-01-21

## 2025-01-21 RX ORDER — FENTANYL CITRATE 50 UG/ML
INJECTION, SOLUTION INTRAMUSCULAR; INTRAVENOUS AS NEEDED
Status: DISCONTINUED | OUTPATIENT
Start: 2025-01-21 | End: 2025-01-21

## 2025-01-21 RX ORDER — CHLORHEXIDINE GLUCONATE 40 MG/ML
SOLUTION TOPICAL DAILY PRN
Status: DISCONTINUED | OUTPATIENT
Start: 2025-01-21 | End: 2025-01-21 | Stop reason: HOSPADM

## 2025-01-21 RX ORDER — LIDOCAINE HYDROCHLORIDE 10 MG/ML
INJECTION, SOLUTION EPIDURAL; INFILTRATION; INTRACAUDAL; PERINEURAL AS NEEDED
Status: DISCONTINUED | OUTPATIENT
Start: 2025-01-21 | End: 2025-01-21

## 2025-01-21 RX ORDER — MIDAZOLAM HYDROCHLORIDE 2 MG/2ML
INJECTION, SOLUTION INTRAMUSCULAR; INTRAVENOUS AS NEEDED
Status: DISCONTINUED | OUTPATIENT
Start: 2025-01-21 | End: 2025-01-21

## 2025-01-21 RX ORDER — EPHEDRINE SULFATE 50 MG/ML
INJECTION INTRAVENOUS AS NEEDED
Status: DISCONTINUED | OUTPATIENT
Start: 2025-01-21 | End: 2025-01-21

## 2025-01-21 RX ORDER — CHLORHEXIDINE GLUCONATE ORAL RINSE 1.2 MG/ML
15 SOLUTION DENTAL ONCE
Status: COMPLETED | OUTPATIENT
Start: 2025-01-21 | End: 2025-01-21

## 2025-01-21 RX ORDER — METOCLOPRAMIDE HYDROCHLORIDE 5 MG/ML
INJECTION INTRAMUSCULAR; INTRAVENOUS AS NEEDED
Status: DISCONTINUED | OUTPATIENT
Start: 2025-01-21 | End: 2025-01-21

## 2025-01-21 RX ADMIN — FENTANYL CITRATE 50 MCG: 50 INJECTION, SOLUTION INTRAMUSCULAR; INTRAVENOUS at 10:08

## 2025-01-21 RX ADMIN — MIDAZOLAM 2 MG: 1 INJECTION INTRAMUSCULAR; INTRAVENOUS at 09:28

## 2025-01-21 RX ADMIN — ONDANSETRON 4 MG: 2 INJECTION INTRAMUSCULAR; INTRAVENOUS at 09:29

## 2025-01-21 RX ADMIN — PROPOFOL 150 MG: 10 INJECTION, EMULSION INTRAVENOUS at 09:32

## 2025-01-21 RX ADMIN — LIDOCAINE HYDROCHLORIDE 50 MG: 10 INJECTION, SOLUTION EPIDURAL; INFILTRATION; INTRACAUDAL; PERINEURAL at 09:32

## 2025-01-21 RX ADMIN — CHLORHEXIDINE GLUCONATE 0.12% ORAL RINSE 15 ML: 1.2 LIQUID ORAL at 07:39

## 2025-01-21 RX ADMIN — CEFAZOLIN SODIUM 2000 MG: 2 SOLUTION INTRAVENOUS at 09:27

## 2025-01-21 RX ADMIN — SODIUM CHLORIDE, SODIUM LACTATE, POTASSIUM CHLORIDE, AND CALCIUM CHLORIDE 75 ML/HR: .6; .31; .03; .02 INJECTION, SOLUTION INTRAVENOUS at 07:39

## 2025-01-21 RX ADMIN — EPHEDRINE SULFATE 5 MG: 50 INJECTION, SOLUTION INTRAVENOUS at 09:43

## 2025-01-21 RX ADMIN — FENTANYL CITRATE 50 MCG: 50 INJECTION, SOLUTION INTRAMUSCULAR; INTRAVENOUS at 09:32

## 2025-01-21 RX ADMIN — METOCLOPRAMIDE 10 MG: 5 INJECTION, SOLUTION INTRAMUSCULAR; INTRAVENOUS at 09:29

## 2025-01-21 RX ADMIN — Medication 100 MCG: at 09:41

## 2025-01-21 NOTE — PERIOPERATIVE NURSING NOTE
Patient received from PACU, alert and awake. Wife at bed side. PO fluids offered. Vitals stable, no c/o pain a this time. Neurovascular assessment on left leg is intact and WDL. ACE wrap is on . IV line is running.  Call bell within reach. Bed is locked and in lowest position.Plan of care in progress.

## 2025-01-21 NOTE — DISCHARGE SUMMARY
Discharge Summary Outpatient Procedure Podiatry -   Stewart Flores 79 y.o. male MRN: [id number]  Unit/Bed#: OR POOL Encounter: [id number]    Admission Date: 1/21/2025     Admitting Diagnosis: Diabetic ulcer of left midfoot associated with type 2 diabetes mellitus, with fat layer exposed (HCC) [E11.621, L97.422]  Charcot joint of left foot [M14.672]    Discharge Diagnosis: same    Procedures Performed: EXCISION EXOSTOSIS/saucerization left foot:     Complications: none    Condition at Discharge: stable    Discharge instructions/Information to patient and family:   See after visit summary for information provided to patient and family.      Provisions for Follow-Up Care/Important appointments:  See after visit summary for information related to follow-up care and any pertinent home health orders.      Discharge Medications:  See after visit summary for reconciled discharge medications provided to patient and family.

## 2025-01-21 NOTE — DISCHARGE INSTR - AVS FIRST PAGE
[facility]  Dr. White  Post-Operative Instructions    1. Take your prescribed medication as needed. You may use tylenol or ibuprofen as needed for pain management   2. Upon arrival at home, lie down and elevate your surgical foot on 2 pillows.  3. Remain quiet, off your feet as much as possible, for the first 24-48 hours. This is when your feet first swell and may become painful. After 48 hours you may begin limited walking following these restrictions:weight bearing as tolerated in a surgical shoe     4. Drink large quantities of water. Consume no alcohol. Continue a well-balanced diet.  5. Report any unusual discomfort or fever to this office.  6. A limited amount of discomfort and swelling is to be expected. In some cases the skin may take on a bruised appearance. The surgical solution that was applied to your foot prior to the operation is dark in color and the operation site may appear to be oozing when it actually is not.  7. A slight amount of blood is to be expected, and is no cause for alarm. Do not remove the dressings. If there is active bleeding and if the bleeding persists, add additional gauze to the bandage, apply direct pressure, elevate your feet and call this office.  8. Do not get the dressings wet. As regular bathing may be inconvenient, sponge baths are recommended. If you shower, make sure the dressing stays dry.   9. When anesthesia wears off and if any discomfort should be present, apply an ice pack directly over the operated area for 15 minute intervals for several hours or until the pain leaves. (USE IN EXCESS OF 15 MINUTES COULD CAUSE FROSTBITE). Do not use hot water bags or electric pads. A convenient icepack can be made by placing ice cubes in a plastic bag and covering this with a towel.  10. If necessary, take a mild laxative before retiring.  11. Wear your special open shoes anytime you put weight on your foot, even if it is just to walk to the bathroom and back. It will  probably be 2 or 3 weeks before you will be permitted to try regular shoes.  12. Having performed the operation, we are interested in a prompt recovery. Please cooperate by following the above instructions.  13. Please call to confirm your post-op appointment or call with any other questions.

## 2025-01-21 NOTE — OP NOTE
OPERATIVE REPORT  PATIENT NAME: Stewart Flores    :  1945  MRN: [id number]  Pt Location: [facility] OR ROOM 02    SURGERY DATE: 2025    Surgeons and Role:     * Ollie White DPM - Primary     * Diogo Marroquin DPM - Assisting    Preop Diagnosis:  Diabetic ulcer of left midfoot associated with type 2 diabetes mellitus, with fat layer exposed (HCC) [E11.621, L97.422]  Charcot joint of left foot [M14.672]    Post-Op Diagnosis Codes:     * Diabetic ulcer of left midfoot associated with type 2 diabetes mellitus, with fat layer exposed (HCC) [E11.621, L97.422]     * Charcot joint of left foot [M14.672]    Procedure(s):  Left - EXCISION EXOSTOSIS/saucerization left foot    Specimen(s):  * No specimens in log *    Estimated Blood Loss:   Minimal    Drains:  * No LDAs found *    Anesthesia Type:   Choice    Operative Indications:  Diabetic ulcer of left midfoot associated with type 2 diabetes mellitus, with fat layer exposed (HCC) [E11.621, L97.422]  Charcot joint of left foot [M14.672]      Operative Findings:  Consistent with above      Complications:   None    Procedure and Technique:  Patient was brought back to the operating where light sedation transferred to the operative table in the supine position.  After anesthesia was administered a preinjection timeout was complete with all parties in agreement.  A local block consisting of 10 cc of 1% lidocaine and 0.5% Marcaine was injected to the left foot.  Tourniquet was then placed on the left lower extremity, but never inflated.  Left lower extremity was then prepped and draped in normal sterile manner.  A preincision timeout was then completed all parties in agreement.    Attention was then drawn to the left foot.  A palpable exostosis was noted along the plantar medial aspect of the foot.  Utilizing a 15 blade a stab incision was made overlying this exostosis.  Blunt dissection was then carried down to the periosteum.  Utilizing a [place] elevator all soft  tissue was removed off of the exostosis.  Under C-arm evaluation a rotary bur from [employer] was utilized to palliate the exostosis.  The bone underlying the exostosis was saucerized to remove any prominence.  C-arm noted excellent palliation of the exostosis.  Mechanical palpation of the area noted offloading of the area.  The incision was then copiously flushed lysing sterile saline.  Skin closure was achieved utilizing 3-0 nylon in a horizontal mattress and simple suture type fashion.  Patient tolerated procedure well with no major complications.     Christopher Mortensen was present for the entire procedure.    Patient Disposition:  PACU              SIGNATURE: Diogo Marroquin DPM  DATE: January 21, 2025  TIME: 10:31 AM

## 2025-01-21 NOTE — PERIOPERATIVE NURSING NOTE
Patient transferred to APU via stretcher. Patient alert and awake, VSS WNL, no pain noted. Bedside report given to AYO Epstein. Side rails up, stretcher in lowest position and locked, call bell within reach.

## 2025-01-21 NOTE — ANESTHESIA POSTPROCEDURE EVALUATION
Post-Op Assessment Note    CV Status:  Stable  Pain Score: 0    Pain management: adequate       Mental Status:  Awake   Hydration Status:  Stable   PONV Controlled:  None   Airway Patency:  Patent     Post Op Vitals Reviewed: Yes    No anethesia notable event occurred.    Staff: Anesthesiologist           Last Filed PACU Vitals:  Vitals Value Taken Time   Temp 98.3 °F (36.8 °C) 01/21/25 1023   Pulse 62 01/21/25 1055   /62 01/21/25 1055   Resp 18 01/21/25 1055   SpO2 96 % 01/21/25 1055       Modified Tiffanie:     Vitals Value Taken Time   Activity 2 01/21/25 1023   Respiration 2 01/21/25 1023   Circulation 2 01/21/25 1023   Consciousness 2 01/21/25 1023   Oxygen Saturation 2 01/21/25 1023     Modified Tiffanie Score: 10

## 2025-01-21 NOTE — ANESTHESIA POSTPROCEDURE EVALUATION
Post-Op Assessment Note    CV Status:  Stable  Pain Score: 0    Pain management: adequate       Mental Status:  Awake and alert   Hydration Status:  Stable   PONV Controlled:  None   Airway Patency:  Patent     Post Op Vitals Reviewed: Yes    No anethesia notable event occurred.    Staff: CRNA           Last Filed PACU Vitals:  Vitals Value Taken Time   Temp 98.3    Pulse 63    /53    Resp 16    SpO2 93 on RA

## 2025-01-21 NOTE — PERIOPERATIVE NURSING NOTE
AVS reviewed with patient and his wife at bed side and all concerns were answered. IV line is taken out. Patient tolerated PO fluids well. Denies any pain at this time. Surgical site dressing is clean, dry and intact. Neurovascular assessment to Left leg is intact and WDL. All belongings were sent with patient. Patient left floor with firm understanding of discharge instructions. Patient escorted to his ride via wheelchair by this nurse.

## 2025-01-21 NOTE — PROGRESS NOTES
"Podiatry - Progress Note  Patient: Stewart Flores 79 y.o. male   MRN: [id number]  PCP: Brady Reilly PA-C  Unit/Bed#: [facility] Encounter: [id number]  Date Of Visit: 25    ASSESSMENT:    Stewart Flores is a 79 y.o. 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  Consent placed in chart.  Signed by both patient as well as myself  Confirmed NPO status.  H&P, vitals, and current labs reviewed.  No acute changes noted.  Alternatives, risks, and complications discussed with patient.  All questions answered.  No guarantees given to outcome of procedure.  Rest of medical care per primary team.    SUBJECTIVE:     The patient was seen, evaluated, and assessed at bedside today. The patient was awake, alert, and in no acute distress. Patient confirmed NPO status. All questions and concerns regarding the surgical procedure addressed. Patient understands risks vs benefits of procedure and remains amenable with plan for surgery today. Patient denies N/V/F/chills/SOB/CP.      OBJECTIVE:     Vitals:   /76   Pulse 58   Temp (!) 97.3 °F (36.3 °C) (Temporal)   Resp 18   Ht 5' 8\" (1.727 m)   Wt 107 kg (235 lb 3.7 oz)   SpO2 98%   BMI 35.77 kg/m²     Temp (24hrs), Av.3 °F (36.3 °C), Min:97.3 °F (36.3 °C), Max:97.3 °F (36.3 °C)      Physical Exam:     General:  Alert, cooperative, and in no distress.  Lower extremity exam:  Cardiovascular status at baseline.  Neurological status at baseline.  Musculoskeletal status at baseline. No calf tenderness noted bilaterally.    Physical Exam  Constitutional:       Appearance: Normal appearance. He is not ill-appearing or diaphoretic.   HENT:      Head: Normocephalic and atraumatic.   Eyes:      General:         Right eye: No discharge.         Left " "eye: No discharge.   Pulmonary:      Effort: Pulmonary effort is normal. No respiratory distress.   Musculoskeletal:      Comments: Left foot deformity noted with hallux abductovalgus, pes planus/slight rocker-bottom deformity noted with plantar medial prominence at the level of the midfoot secondary to Charcot neuroarthropathy   Skin:     Capillary Refill: Capillary refill takes less than 2 seconds.      Comments: Left plantar foot is fully epithelialized today with no openings or signs of infection noted.  Neurological:      Mental Status: He is alert.      Sensory: Sensory deficit (7/10 locations felt with monofilament of the left foot) present.   Psychiatric:         Mood and Affect: Mood normal.       Additional Data:     Labs:              Invalid input(s): \"LABALBU\"        * I Have Reviewed All Lab Data Listed Above.    Recent Cultures (last 7 days):               Imaging: I have personally reviewed pertinent films in PACS  EKG, Pathology, and Other Studies: I have personally reviewed pertinent reports.    ** Please Note: Portions of the record may have been created with voice recognition software. Occasional wrong word or \"sound a like\" substitutions may have occurred due to the inherent limitations of voice recognition software. Read the chart carefully and recognize, using context, where substitutions have occurred. **      "

## 2025-01-24 ENCOUNTER — TELEPHONE (OUTPATIENT)
Age: 80
End: 2025-01-24

## 2025-01-24 NOTE — TELEPHONE ENCOUNTER
Caller: Karolyn -spouse     Doctor: Christopher    Reason for call: patient needs a post op appointment scheduled please contact his wife to schedule     Call back#: [phone]

## 2025-01-27 ENCOUNTER — OFFICE VISIT (OUTPATIENT)
Age: 80
End: 2025-01-27

## 2025-01-27 VITALS — BODY MASS INDEX: 35.61 KG/M2 | WEIGHT: 235 LBS | HEIGHT: 68 IN

## 2025-01-27 DIAGNOSIS — Z98.890 POST-OPERATIVE STATE: Primary | ICD-10-CM

## 2025-01-27 PROCEDURE — 99024 POSTOP FOLLOW-UP VISIT: CPT | Performed by: STUDENT IN AN ORGANIZED HEALTH CARE EDUCATION/TRAINING PROGRAM

## 2025-01-27 NOTE — PROGRESS NOTES
Post-Op Visit    Stewart Plummermple  1945      Subjective: Patient here for post-op appointment following exostectomy/saucerization of the left foot. Patient denies significant pain at the surgical site, active strikethrough drainage, fevers, chills, nightsweats, SOB, chest pain, nor calf pain. The patient is in good spirits.Patient relates compliance with post-op instructions.    Objective: The patient appears in NAD / non-toxic. Primary dressing and splint/cast taken down for wound inspection.VSS. No signs of infection. No active drainage. Normal post-op edema. No necrosis, dehiscence.     Assessment/Plan:  Patient is stable post-op  Discussed compliance with weight bearing instructions, incision care, and rest.   Sutures left in place and intact today  Incision site redressed with Xeroform, dry sterile dressing.  To be left clean, dry, and intact to the patient's next postoperative visit.  Continue weightbearing as tolerated to the left foot in specialty offloaded surgical shoe    Call if any increase in pain, fevers, calf pain, shortness of breath, or general distress is noted. Patient instructed to go to ER if call is not returned immediately.

## 2025-01-30 ENCOUNTER — APPOINTMENT (EMERGENCY)
Dept: RADIOLOGY | Facility: HOSPITAL | Age: 80
End: 2025-01-30
Payer: COMMERCIAL

## 2025-01-30 ENCOUNTER — HOSPITAL ENCOUNTER (INPATIENT)
Facility: HOSPITAL | Age: 80
LOS: 5 days | End: 2025-02-04
Admitting: STUDENT IN AN ORGANIZED HEALTH CARE EDUCATION/TRAINING PROGRAM
Payer: COMMERCIAL

## 2025-01-30 DIAGNOSIS — Z79.4 TYPE 2 DIABETES MELLITUS WITH OTHER SPECIFIED COMPLICATION, WITH LONG-TERM CURRENT USE OF INSULIN (HCC): ICD-10-CM

## 2025-01-30 DIAGNOSIS — M54.41 ACUTE LOW BACK PAIN WITH RIGHT-SIDED SCIATICA, UNSPECIFIED BACK PAIN LATERALITY: ICD-10-CM

## 2025-01-30 DIAGNOSIS — R26.2 AMBULATORY DYSFUNCTION: Primary | ICD-10-CM

## 2025-01-30 DIAGNOSIS — R19.7 DIARRHEA: ICD-10-CM

## 2025-01-30 DIAGNOSIS — Z98.890 STATUS POST LEFT FOOT SURGERY: ICD-10-CM

## 2025-01-30 DIAGNOSIS — E11.69 TYPE 2 DIABETES MELLITUS WITH OTHER SPECIFIED COMPLICATION, WITH LONG-TERM CURRENT USE OF INSULIN (HCC): ICD-10-CM

## 2025-01-30 DIAGNOSIS — D72.829 LEUKOCYTOSIS, UNSPECIFIED TYPE: ICD-10-CM

## 2025-01-30 DIAGNOSIS — M54.30 SCIATICA: ICD-10-CM

## 2025-01-30 PROBLEM — R60.0 BILATERAL LEG EDEMA: Status: ACTIVE | Noted: 2025-01-30

## 2025-01-30 LAB
ALBUMIN SERPL BCG-MCNC: 4.5 G/DL (ref 3.5–5)
ALP SERPL-CCNC: 55 U/L (ref 34–104)
ALT SERPL W P-5'-P-CCNC: 24 U/L (ref 7–52)
ANION GAP SERPL CALCULATED.3IONS-SCNC: 13 MMOL/L (ref 4–13)
AST SERPL W P-5'-P-CCNC: 23 U/L (ref 13–39)
BASOPHILS # BLD AUTO: 0.08 THOUSANDS/ΜL (ref 0–0.1)
BASOPHILS NFR BLD AUTO: 1 % (ref 0–1)
BILIRUB DIRECT SERPL-MCNC: 0.1 MG/DL (ref 0–0.2)
BILIRUB SERPL-MCNC: 0.49 MG/DL (ref 0.2–1)
BUN SERPL-MCNC: 29 MG/DL (ref 5–25)
CALCIUM SERPL-MCNC: 10.3 MG/DL (ref 8.4–10.2)
CHLORIDE SERPL-SCNC: 107 MMOL/L (ref 96–108)
CO2 SERPL-SCNC: 21 MMOL/L (ref 21–32)
CREAT SERPL-MCNC: 1.76 MG/DL (ref 0.6–1.3)
EOSINOPHIL # BLD AUTO: 0.22 THOUSAND/ΜL (ref 0–0.61)
EOSINOPHIL NFR BLD AUTO: 2 % (ref 0–6)
ERYTHROCYTE [DISTWIDTH] IN BLOOD BY AUTOMATED COUNT: 13.6 % (ref 11.6–15.1)
GFR SERPL CREATININE-BSD FRML MDRD: 36 ML/MIN/1.73SQ M
GLUCOSE SERPL-MCNC: 135 MG/DL (ref 65–140)
GLUCOSE SERPL-MCNC: 199 MG/DL (ref 65–140)
HCT VFR BLD AUTO: 46.3 % (ref 36.5–49.3)
HGB BLD-MCNC: 15.4 G/DL (ref 12–17)
IMM GRANULOCYTES # BLD AUTO: 0.02 THOUSAND/UL (ref 0–0.2)
IMM GRANULOCYTES NFR BLD AUTO: 0 % (ref 0–2)
LYMPHOCYTES # BLD AUTO: 2.02 THOUSANDS/ΜL (ref 0.6–4.47)
LYMPHOCYTES NFR BLD AUTO: 16 % (ref 14–44)
MCH RBC QN AUTO: 31.4 PG (ref 26.8–34.3)
MCHC RBC AUTO-ENTMCNC: 33.3 G/DL (ref 31.4–37.4)
MCV RBC AUTO: 95 FL (ref 82–98)
MONOCYTES # BLD AUTO: 0.99 THOUSAND/ΜL (ref 0.17–1.22)
MONOCYTES NFR BLD AUTO: 8 % (ref 4–12)
NEUTROPHILS # BLD AUTO: 9.19 THOUSANDS/ΜL (ref 1.85–7.62)
NEUTS SEG NFR BLD AUTO: 73 % (ref 43–75)
NRBC BLD AUTO-RTO: 0 /100 WBCS
PLATELET # BLD AUTO: 217 THOUSANDS/UL (ref 149–390)
PMV BLD AUTO: 9.5 FL (ref 8.9–12.7)
POTASSIUM SERPL-SCNC: 3.8 MMOL/L (ref 3.5–5.3)
PROT SERPL-MCNC: 7.4 G/DL (ref 6.4–8.4)
RBC # BLD AUTO: 4.9 MILLION/UL (ref 3.88–5.62)
SODIUM SERPL-SCNC: 141 MMOL/L (ref 135–147)
WBC # BLD AUTO: 12.52 THOUSAND/UL (ref 4.31–10.16)

## 2025-01-30 PROCEDURE — 99285 EMERGENCY DEPT VISIT HI MDM: CPT

## 2025-01-30 PROCEDURE — 80076 HEPATIC FUNCTION PANEL: CPT | Performed by: NURSE PRACTITIONER

## 2025-01-30 PROCEDURE — 99284 EMERGENCY DEPT VISIT MOD MDM: CPT

## 2025-01-30 PROCEDURE — 80048 BASIC METABOLIC PNL TOTAL CA: CPT

## 2025-01-30 PROCEDURE — 82948 REAGENT STRIP/BLOOD GLUCOSE: CPT

## 2025-01-30 PROCEDURE — 85025 COMPLETE CBC W/AUTO DIFF WBC: CPT

## 2025-01-30 PROCEDURE — 72131 CT LUMBAR SPINE W/O DYE: CPT

## 2025-01-30 PROCEDURE — 99222 1ST HOSP IP/OBS MODERATE 55: CPT | Performed by: NURSE PRACTITIONER

## 2025-01-30 PROCEDURE — 73630 X-RAY EXAM OF FOOT: CPT

## 2025-01-30 PROCEDURE — 73590 X-RAY EXAM OF LOWER LEG: CPT

## 2025-01-30 PROCEDURE — 36415 COLL VENOUS BLD VENIPUNCTURE: CPT

## 2025-01-30 RX ORDER — CHLORAL HYDRATE 500 MG
1000 CAPSULE ORAL 2 TIMES DAILY
Status: DISCONTINUED | OUTPATIENT
Start: 2025-01-31 | End: 2025-02-04 | Stop reason: HOSPADM

## 2025-01-30 RX ORDER — ENOXAPARIN SODIUM 100 MG/ML
30 INJECTION SUBCUTANEOUS DAILY
Status: DISCONTINUED | OUTPATIENT
Start: 2025-01-31 | End: 2025-02-04 | Stop reason: HOSPADM

## 2025-01-30 RX ORDER — TORSEMIDE 20 MG/1
20 TABLET ORAL DAILY
Status: DISCONTINUED | OUTPATIENT
Start: 2025-01-31 | End: 2025-02-04 | Stop reason: HOSPADM

## 2025-01-30 RX ORDER — INSULIN ASPART 100 [IU]/ML
50 INJECTION, SUSPENSION SUBCUTANEOUS
Status: DISCONTINUED | OUTPATIENT
Start: 2025-01-31 | End: 2025-02-04 | Stop reason: HOSPADM

## 2025-01-30 RX ORDER — INSULIN LISPRO 100 [IU]/ML
1-5 INJECTION, SOLUTION INTRAVENOUS; SUBCUTANEOUS
Status: DISCONTINUED | OUTPATIENT
Start: 2025-01-31 | End: 2025-02-03

## 2025-01-30 RX ORDER — PRAVASTATIN SODIUM 40 MG
40 TABLET ORAL
Status: DISCONTINUED | OUTPATIENT
Start: 2025-01-31 | End: 2025-02-04 | Stop reason: HOSPADM

## 2025-01-30 RX ORDER — SACCHAROMYCES BOULARDII 250 MG
250 CAPSULE ORAL 2 TIMES DAILY
Status: DISCONTINUED | OUTPATIENT
Start: 2025-01-31 | End: 2025-02-04 | Stop reason: HOSPADM

## 2025-01-30 RX ORDER — EPLERENONE 25 MG/1
25 TABLET, FILM COATED ORAL DAILY
Status: DISCONTINUED | OUTPATIENT
Start: 2025-01-31 | End: 2025-02-04 | Stop reason: HOSPADM

## 2025-01-30 RX ORDER — GABAPENTIN 100 MG/1
100 CAPSULE ORAL
Status: DISCONTINUED | OUTPATIENT
Start: 2025-01-30 | End: 2025-02-04

## 2025-01-30 RX ORDER — LORAZEPAM 2 MG/ML
1 INJECTION INTRAMUSCULAR ONCE AS NEEDED
Status: COMPLETED | OUTPATIENT
Start: 2025-01-30 | End: 2025-01-31

## 2025-01-30 RX ORDER — ACETAMINOPHEN 325 MG/1
975 TABLET ORAL EVERY 8 HOURS SCHEDULED
Status: DISCONTINUED | OUTPATIENT
Start: 2025-01-30 | End: 2025-02-04 | Stop reason: HOSPADM

## 2025-01-30 RX ORDER — INSULIN LISPRO 100 [IU]/ML
1-5 INJECTION, SOLUTION INTRAVENOUS; SUBCUTANEOUS
Status: DISCONTINUED | OUTPATIENT
Start: 2025-01-30 | End: 2025-02-04 | Stop reason: HOSPADM

## 2025-01-30 RX ORDER — OXYCODONE HYDROCHLORIDE 5 MG/1
5 TABLET ORAL ONCE
Refills: 0 | Status: COMPLETED | OUTPATIENT
Start: 2025-01-30 | End: 2025-01-30

## 2025-01-30 RX ORDER — GLIPIZIDE 5 MG/1
5 TABLET ORAL EVERY MORNING
Status: DISCONTINUED | OUTPATIENT
Start: 2025-01-31 | End: 2025-02-04 | Stop reason: HOSPADM

## 2025-01-30 RX ORDER — EPLERENONE 25 MG/1
50 TABLET, FILM COATED ORAL 2 TIMES DAILY
Status: DISCONTINUED | OUTPATIENT
Start: 2025-01-31 | End: 2025-01-30

## 2025-01-30 RX ORDER — ONDANSETRON 2 MG/ML
4 INJECTION INTRAMUSCULAR; INTRAVENOUS EVERY 4 HOURS PRN
Status: DISCONTINUED | OUTPATIENT
Start: 2025-01-30 | End: 2025-02-04 | Stop reason: HOSPADM

## 2025-01-30 RX ORDER — TORSEMIDE 10 MG/1
10 TABLET ORAL
Status: DISCONTINUED | OUTPATIENT
Start: 2025-01-31 | End: 2025-02-04 | Stop reason: HOSPADM

## 2025-01-30 RX ORDER — ASPIRIN 81 MG/1
81 TABLET ORAL DAILY
Status: DISCONTINUED | OUTPATIENT
Start: 2025-01-31 | End: 2025-02-04 | Stop reason: HOSPADM

## 2025-01-30 RX ORDER — CHLORAL HYDRATE 500 MG
1000 CAPSULE ORAL 2 TIMES DAILY
Status: DISCONTINUED | OUTPATIENT
Start: 2025-01-30 | End: 2025-01-30

## 2025-01-30 RX ORDER — ALLOPURINOL 100 MG/1
100 TABLET ORAL 2 TIMES DAILY
Status: DISCONTINUED | OUTPATIENT
Start: 2025-01-31 | End: 2025-02-04 | Stop reason: HOSPADM

## 2025-01-30 RX ORDER — LIDOCAINE 50 MG/G
1 PATCH TOPICAL ONCE
Status: COMPLETED | OUTPATIENT
Start: 2025-01-30 | End: 2025-01-31

## 2025-01-30 RX ORDER — HYDROMORPHONE HCL IN WATER/PF 6 MG/30 ML
0.2 PATIENT CONTROLLED ANALGESIA SYRINGE INTRAVENOUS EVERY 4 HOURS PRN
Status: DISCONTINUED | OUTPATIENT
Start: 2025-01-30 | End: 2025-02-04 | Stop reason: HOSPADM

## 2025-01-30 RX ORDER — LOSARTAN POTASSIUM 50 MG/1
50 TABLET ORAL DAILY
Status: DISCONTINUED | OUTPATIENT
Start: 2025-01-31 | End: 2025-02-04 | Stop reason: HOSPADM

## 2025-01-30 RX ORDER — INSULIN LISPRO 100 [IU]/ML
1-5 INJECTION, SOLUTION INTRAVENOUS; SUBCUTANEOUS
Status: DISCONTINUED | OUTPATIENT
Start: 2025-01-31 | End: 2025-02-04 | Stop reason: HOSPADM

## 2025-01-30 RX ADMIN — Medication 2.5 MG: at 21:19

## 2025-01-30 RX ADMIN — ACETAMINOPHEN 975 MG: 325 TABLET ORAL at 21:20

## 2025-01-30 RX ADMIN — OXYCODONE HYDROCHLORIDE 5 MG: 5 TABLET ORAL at 15:08

## 2025-01-30 RX ADMIN — LIDOCAINE 1 PATCH: 50 PATCH CUTANEOUS at 15:09

## 2025-01-30 RX ADMIN — GABAPENTIN 100 MG: 100 CAPSULE ORAL at 21:20

## 2025-01-30 RX ADMIN — INSULIN LISPRO 1 UNITS: 100 INJECTION, SOLUTION INTRAVENOUS; SUBCUTANEOUS at 21:23

## 2025-01-30 NOTE — Clinical Note
Case was discussed with [facility] and the patient's admission status was agreed to be Admission Status: observation status to the service of [facility] .

## 2025-01-30 NOTE — ED PROVIDER NOTES
Time reflects when diagnosis was documented in both MDM as applicable and the Disposition within this note       Time User Action Codes Description Comment    1/30/2025  5:24 PM Yokubaitis, Tyree Add [R26.2] Ambulatory dysfunction     1/30/2025  5:24 PM Yokubaitis, Tyree Add [M54.30] Sciatica     1/30/2025  8:25 PM Felipe Fernández Add [D72.829] Leukocytosis, unspecified type     1/30/2025  8:25 PM Felipe Fernández Add [Z98.890] Status post left foot surgery           ED Disposition       ED Disposition   Admit    Condition   Stable    Date/Time   Thu Jan 30, 2025  6:41 PM    Comment   Case was discussed with [facility] and the patient's admission status was agreed to be Admission Status: inpatient status to the service of [facility] .               Assessment & Plan       Medical Decision Making  79-year-old male present emergency department due to left leg pain.  Likely related to low back given worsening when patient is standing, inability to reproduce it on exam.  However, given recent procedure on the left foot, obtained x-rays.  Discussed with podiatry who note that they also doubt this is related to his procedure.  X-rays negative.  Attempted pain control and ambulation, but patient unable to take more than 1 step away from his bed.  Discussed options including PT/rehab, patient and wife would prefer this due to his inability to perform ADLs at home given the pain.  Discussed with internal medicine who is agreeable with plan.    Amount and/or Complexity of Data Reviewed  Labs: ordered.  Radiology: ordered.    Risk  Prescription drug management.  Decision regarding hospitalization.             Medications   lidocaine (LIDODERM) 5 % patch 1 patch (1 patch Topical Medication Applied 1/30/25 5290)   oxyCODONE (ROXICODONE) IR tablet 5 mg (5 mg Oral Given 1/30/25 1508)       ED Risk Strat Scores                                              History of Present Illness       Chief Complaint   Patient presents with    Leg Pain     Patient  c/o left ankle pain radiating up to his lower back starting two days ago.  States had surgery on his left foot on 1/21.       Past Medical History:   Diagnosis Date    Arthritis     Chronic kidney disease     Diabetes mellitus (HCC)     History of wound infection ?2013    RIGHT LOWER LEG. WAS + FOR STAPH INFECTION AT THAT TIME    Hypertension     Shoulder abrasion     right side after a fall in Feb 2018      Past Surgical History:   Procedure Laterality Date    BONE EXOSTOSIS EXCISION Left 1/21/2025    Procedure: EXCISION EXOSTOSIS/saucerization left foot;  Surgeon: Ollie White DPM;  Location: [facility];  Service: Podiatry    CHOLECYSTECTOMY      COLONOSCOPY      KNEE ARTHROPLASTY Left 2014    MA EXC B9 LESION MRGN XCP SK TG S/N/H/F/G 1.1-2.0CM Left 07/30/2018    Procedure: EXCISIONAL BIOPSY BENIGN NEOPLASM OF SKIN EXTREMITY;  Surgeon: Julio Cesar Trejo Jr., DPM;  Location: [facility];  Service: Podiatry    STEROID INJECTION SHOULDER Right     8 CERIVAL SPINE INJECTIONS    TONSILLECTOMY        History reviewed. No pertinent family history.   Social History     Tobacco Use    Smoking status: Every Day     Types: Cigars    Smokeless tobacco: Never    Tobacco comments:     1 cigar a day.   Substance Use Topics    Alcohol use: Yes     Alcohol/week: 5.0 standard drinks of alcohol     Types: 5 Cans of beer per week     Comment: DAY,SOMETIMES MORE PER PATIENT    Drug use: No      E-Cigarette/Vaping      E-Cigarette/Vaping Substances      I have reviewed and agree with the history as documented.     79-year-old male with history of low back pain, received injections about 10 years ago, recent surgery on his left heel for diabetic ulcer, presents emergency department due to worsening low back pain rating into the left leg that has led to inability ambulate at home.  Patient notes that he has had similar pains in the past, was diagnosed with sciatica, but never had it this bad.  Has not had any recent falls or trauma.   Notes that he had been attempting to use a walker over the past week with difficulty.  Today he was having trouble getting out of his bed and taking more than a few steps so he called EMS to come to the hospital.  Denies numbness, incontinence, or other associated complaints.        Review of Systems   All other systems reviewed and are negative.          Objective       ED Triage Vitals [01/30/25 1444]   Temperature Pulse Blood Pressure Respirations SpO2 Patient Position - Orthostatic VS   97.8 °F (36.6 °C) 64 (!) 212/86 19 98 % Lying      Temp Source Heart Rate Source BP Location FiO2 (%) Pain Score    Tympanic Monitor Left arm -- 9      Vitals      Date and Time Temp Pulse SpO2 Resp BP Pain Score FACES Pain Rating User   01/30/25 2035 -- -- -- -- -- No Pain -- MCS   01/30/25 2013 98 °F (36.7 °C) -- -- -- -- -- -- TJ   01/30/25 2013 -- -- -- 18 136/72 -- -- DII   01/30/25 1847 -- 58 97 % 18 152/68 -- -- CG   01/30/25 1444 97.8 °F (36.6 °C) 64 98 % 19 212/86 9 when bearing weight -- AC            Physical Exam  Vitals and nursing note reviewed.   Constitutional:       General: He is not in acute distress.     Appearance: He is well-developed.   HENT:      Head: Normocephalic and atraumatic.   Eyes:      Conjunctiva/sclera: Conjunctivae normal.   Cardiovascular:      Rate and Rhythm: Normal rate and regular rhythm.      Heart sounds: No murmur heard.  Pulmonary:      Effort: Pulmonary effort is normal. No respiratory distress.      Breath sounds: Normal breath sounds.   Abdominal:      Palpations: Abdomen is soft.      Tenderness: There is no abdominal tenderness.   Musculoskeletal:         General: Tenderness (Left paraspinal musculature) present. No swelling.      Cervical back: Neck supple.   Skin:     General: Skin is warm and dry.      Capillary Refill: Capillary refill takes less than 2 seconds.   Neurological:      Mental Status: He is alert.   Psychiatric:         Mood and Affect: Mood normal.          Results Reviewed       Procedure Component Value Units Date/Time    Hepatic function panel [[id number]]  (Normal) Collected: 01/30/25 1743    Lab Status: Final result Specimen: Blood from Arm, Left Updated: 01/30/25 2055     Total Bilirubin 0.49 mg/dL      Bilirubin, Direct 0.10 mg/dL      Alkaline Phosphatase 55 U/L      AST 23 U/L      ALT 24 U/L      Total Protein 7.4 g/dL      Albumin 4.5 g/dL     Basic metabolic panel [[id number]]  (Abnormal) Collected: 01/30/25 1743    Lab Status: Final result Specimen: Blood from Arm, Left Updated: 01/30/25 1830     Sodium 141 mmol/L      Potassium 3.8 mmol/L      Chloride 107 mmol/L      CO2 21 mmol/L      ANION GAP 13 mmol/L      BUN 29 mg/dL      Creatinine 1.76 mg/dL      Glucose 135 mg/dL      Calcium 10.3 mg/dL      eGFR 36 ml/min/1.73sq m     Narrative:      National Kidney Disease Foundation guidelines for Chronic Kidney Disease (CKD):     Stage 1 with normal or high GFR (GFR > 90 mL/min/1.73 square meters)    Stage 2 Mild CKD (GFR = 60-89 mL/min/1.73 square meters)    Stage 3A Moderate CKD (GFR = 45-59 mL/min/1.73 square meters)    Stage 3B Moderate CKD (GFR = 30-44 mL/min/1.73 square meters)    Stage 4 Severe CKD (GFR = 15-29 mL/min/1.73 square meters)    Stage 5 End Stage CKD (GFR <15 mL/min/1.73 square meters)  Note: GFR calculation is accurate only with a steady state creatinine    CBC and differential [[id number]]  (Abnormal) Collected: 01/30/25 1743    Lab Status: Final result Specimen: Blood from Arm, Left Updated: 01/30/25 1748     WBC 12.52 Thousand/uL      RBC 4.90 Million/uL      Hemoglobin 15.4 g/dL      Hematocrit 46.3 %      MCV 95 fL      MCH 31.4 pg      MCHC 33.3 g/dL      RDW 13.6 %      MPV 9.5 fL      Platelets 217 Thousands/uL      nRBC 0 /100 WBCs      Segmented % 73 %      Immature Grans % 0 %      Lymphocytes % 16 %      Monocytes % 8 %      Eosinophils Relative 2 %      Basophils Relative 1 %      Absolute Neutrophils 9.19 Thousands/µL       Absolute Immature Grans 0.02 Thousand/uL      Absolute Lymphocytes 2.02 Thousands/µL      Absolute Monocytes 0.99 Thousand/µL      Eosinophils Absolute 0.22 Thousand/µL      Basophils Absolute 0.08 Thousands/µL             CT lumbar spine without contrast   Final Interpretation by Ricky Larson MD (01/30 1830)      Chronic disc and facet degenerative change resulting in nerve root encroachment in the lower lumbar spine.      L4-5 left paracentral to foraminal zone disc protrusion encroaching on the traversing left L5 nerve root. MRI may be helpful for further evaluation.      Workstation performed: [id number]         XR tibia fibula 2 views LEFT   Final Interpretation by Izabel Vu MD (01/30 1609)      No acute osseous abnormality.            Computerized Assisted Algorithm (CAA) may have been used to analyze all applicable images.               Workstation performed: [id number]         XR foot 3+ vw left   Final Interpretation by Izabel Vu MD (01/30 1608)      No acute osseous abnormality.         Computerized Assisted Algorithm (CAA) may have been used to analyze all applicable images.         Workstation performed: [id number]         MRI inpatient order    (Results Pending)       Procedures    ED Medication and Procedure Management   Prior to Admission Medications   Prescriptions Last Dose Informant Patient Reported? Taking?   Continuous Glucose Sensor (FreeStyle [place] 2 Sensor) MISC   Yes No   Sig: USE 1 SENSOR EVERY 14 DAYS   allopurinol (ZYLOPRIM) 100 mg tablet 1/30/2025 Morning  Yes Yes   Sig: Take 100 mg by mouth 2 (two) times a day   aspirin (ECOTRIN LOW STRENGTH) 81 mg EC tablet 1/30/2025 Morning  Yes Yes   Sig: Take 81 mg by mouth daily   dapagliflozin (Farxiga) 5 MG TABS 1/29/2025 Evening  Yes Yes   Sig: Take by mouth daily   eplerenone (INSPRA) 50 MG tablet 1/30/2025 Morning  Yes Yes   Sig: Take 50 mg by mouth 2 (two) times a day Takes 2 tabs bid   glipiZIDE (GLUCOTROL) 5 mg  tablet 1/30/2025 Morning  Yes Yes   Sig: Take 5 mg by mouth every morning   insulin lispro protamine-insulin lispro (HumaLOG 75/25) 100 units/mL 1/30/2025 Morning  Yes Yes   Sig: Inject 60 Units under the skin 2 (two) times a day before meals 60 units a.m. And 58 units pm   losartan (COZAAR) 50 mg tablet 1/30/2025 Morning  Yes Yes   Sig: Take 50 mg by mouth daily   metoprolol succinate (TOPROL-XL) 100 mg 24 hr tablet 1/29/2025 Evening  Yes Yes   Sig: Take 150 mg by mouth daily at bedtime   omega-3-acid ethyl esters (LOVAZA) 1 g capsule 1/30/2025 Morning  Yes Yes   Sig: Take 1 g by mouth 2 (two) times a day   rosuvastatin (CRESTOR) 5 mg tablet 1/29/2025 Evening  Yes Yes   Sig: Take 5 mg by mouth daily   torsemide (DEMADEX) 20 mg tablet 1/30/2025 Morning  Yes Yes   Sig: Take 20 mg by mouth 2 (two) times a day 1 tablet in AM and 1/2 tablet pm      Facility-Administered Medications: None     Current Discharge Medication List        CONTINUE these medications which have NOT CHANGED    Details   allopurinol (ZYLOPRIM) 100 mg tablet Take 100 mg by mouth 2 (two) times a day      aspirin (ECOTRIN LOW STRENGTH) 81 mg EC tablet Take 81 mg by mouth daily      dapagliflozin (Farxiga) 5 MG TABS Take by mouth daily      eplerenone (INSPRA) 50 MG tablet Take 50 mg by mouth 2 (two) times a day Takes 2 tabs bid      glipiZIDE (GLUCOTROL) 5 mg tablet Take 5 mg by mouth every morning      insulin lispro protamine-insulin lispro (HumaLOG 75/25) 100 units/mL Inject 60 Units under the skin 2 (two) times a day before meals 60 units a.m. And 58 units pm      losartan (COZAAR) 50 mg tablet Take 50 mg by mouth daily      metoprolol succinate (TOPROL-XL) 100 mg 24 hr tablet Take 150 mg by mouth daily at bedtime      omega-3-acid ethyl esters (LOVAZA) 1 g capsule Take 1 g by mouth 2 (two) times a day      rosuvastatin (CRESTOR) 5 mg tablet Take 5 mg by mouth daily      torsemide (DEMADEX) 20 mg tablet Take 20 mg by mouth 2 (two) times a day 1  tablet in AM and 1/2 tablet pm      Continuous Glucose Sensor (FreeStyle [place] 2 Sensor) MISC USE 1 SENSOR EVERY 14 DAYS           No discharge procedures on file.  ED SEPSIS DOCUMENTATION   Time reflects when diagnosis was documented in both MDM as applicable and the Disposition within this note       Time User Action Codes Description Comment    1/30/2025  5:24 PM Tyree Wright Add [R26.2] Ambulatory dysfunction     1/30/2025  5:24 PM Tyree Wright Add [M54.30] Sciatica     1/30/2025  8:25 PM Felipe Fernández Add [D72.829] Leukocytosis, unspecified type     1/30/2025  8:25 PM Felipe Fernández Add [Z98.890] Status post left foot surgery                  Tyree Wright MD  01/30/25 2057

## 2025-01-31 ENCOUNTER — APPOINTMENT (INPATIENT)
Dept: RADIOLOGY | Facility: HOSPITAL | Age: 80
End: 2025-01-31
Payer: COMMERCIAL

## 2025-01-31 PROBLEM — N17.9 ACUTE KIDNEY INJURY SUPERIMPOSED ON CHRONIC KIDNEY DISEASE  (HCC): Status: ACTIVE | Noted: 2025-01-20

## 2025-01-31 PROBLEM — Z98.890 STATUS POST LEFT FOOT SURGERY: Status: ACTIVE | Noted: 2025-01-31

## 2025-01-31 LAB
ANION GAP SERPL CALCULATED.3IONS-SCNC: 14 MMOL/L (ref 4–13)
BACTERIA UR QL AUTO: ABNORMAL /HPF
BILIRUB UR QL STRIP: NEGATIVE
BUN SERPL-MCNC: 30 MG/DL (ref 5–25)
CALCIUM SERPL-MCNC: 9.8 MG/DL (ref 8.4–10.2)
CHLORIDE SERPL-SCNC: 107 MMOL/L (ref 96–108)
CLARITY UR: CLEAR
CO2 SERPL-SCNC: 19 MMOL/L (ref 21–32)
COLOR UR: YELLOW
CREAT SERPL-MCNC: 1.82 MG/DL (ref 0.6–1.3)
ERYTHROCYTE [DISTWIDTH] IN BLOOD BY AUTOMATED COUNT: 13.6 % (ref 11.6–15.1)
GFR SERPL CREATININE-BSD FRML MDRD: 34 ML/MIN/1.73SQ M
GLUCOSE SERPL-MCNC: 157 MG/DL (ref 65–140)
GLUCOSE SERPL-MCNC: 175 MG/DL (ref 65–140)
GLUCOSE SERPL-MCNC: 184 MG/DL (ref 65–140)
GLUCOSE SERPL-MCNC: 202 MG/DL (ref 65–140)
GLUCOSE SERPL-MCNC: 221 MG/DL (ref 65–140)
GLUCOSE SERPL-MCNC: 84 MG/DL (ref 65–140)
GLUCOSE UR STRIP-MCNC: ABNORMAL MG/DL
HCT VFR BLD AUTO: 46.3 % (ref 36.5–49.3)
HGB BLD-MCNC: 15.4 G/DL (ref 12–17)
HGB UR QL STRIP.AUTO: NEGATIVE
HYALINE CASTS #/AREA URNS LPF: ABNORMAL /LPF
KETONES UR STRIP-MCNC: NEGATIVE MG/DL
LEUKOCYTE ESTERASE UR QL STRIP: NEGATIVE
MAGNESIUM SERPL-MCNC: 2 MG/DL (ref 1.9–2.7)
MCH RBC QN AUTO: 31.3 PG (ref 26.8–34.3)
MCHC RBC AUTO-ENTMCNC: 33.3 G/DL (ref 31.4–37.4)
MCV RBC AUTO: 94 FL (ref 82–98)
NITRITE UR QL STRIP: NEGATIVE
NON-SQ EPI CELLS URNS QL MICRO: ABNORMAL /HPF
PH UR STRIP.AUTO: 5 [PH]
PLATELET # BLD AUTO: 219 THOUSANDS/UL (ref 149–390)
PMV BLD AUTO: 9.4 FL (ref 8.9–12.7)
POTASSIUM SERPL-SCNC: 3.9 MMOL/L (ref 3.5–5.3)
PROT UR STRIP-MCNC: NEGATIVE MG/DL
RBC # BLD AUTO: 4.92 MILLION/UL (ref 3.88–5.62)
RBC #/AREA URNS AUTO: ABNORMAL /HPF
SODIUM SERPL-SCNC: 140 MMOL/L (ref 135–147)
SP GR UR STRIP.AUTO: 1.01 (ref 1–1.03)
UROBILINOGEN UR STRIP-ACNC: <2 MG/DL
WBC # BLD AUTO: 13.33 THOUSAND/UL (ref 4.31–10.16)
WBC #/AREA URNS AUTO: ABNORMAL /HPF

## 2025-01-31 PROCEDURE — 85027 COMPLETE CBC AUTOMATED: CPT | Performed by: NURSE PRACTITIONER

## 2025-01-31 PROCEDURE — 83735 ASSAY OF MAGNESIUM: CPT | Performed by: NURSE PRACTITIONER

## 2025-01-31 PROCEDURE — 72148 MRI LUMBAR SPINE W/O DYE: CPT

## 2025-01-31 PROCEDURE — 80048 BASIC METABOLIC PNL TOTAL CA: CPT | Performed by: NURSE PRACTITIONER

## 2025-01-31 PROCEDURE — NC001 PR NO CHARGE: Performed by: NEUROLOGICAL SURGERY

## 2025-01-31 PROCEDURE — 81001 URINALYSIS AUTO W/SCOPE: CPT | Performed by: HOSPITALIST

## 2025-01-31 PROCEDURE — 97530 THERAPEUTIC ACTIVITIES: CPT

## 2025-01-31 PROCEDURE — 99232 SBSQ HOSP IP/OBS MODERATE 35: CPT | Performed by: HOSPITALIST

## 2025-01-31 PROCEDURE — 82948 REAGENT STRIP/BLOOD GLUCOSE: CPT

## 2025-01-31 PROCEDURE — 99024 POSTOP FOLLOW-UP VISIT: CPT | Performed by: STUDENT IN AN ORGANIZED HEALTH CARE EDUCATION/TRAINING PROGRAM

## 2025-01-31 PROCEDURE — 97163 PT EVAL HIGH COMPLEX 45 MIN: CPT

## 2025-01-31 RX ORDER — OXYCODONE HYDROCHLORIDE 5 MG/1
5 TABLET ORAL EVERY 4 HOURS PRN
Refills: 0 | Status: DISCONTINUED | OUTPATIENT
Start: 2025-01-31 | End: 2025-01-31

## 2025-01-31 RX ORDER — OXYCODONE HYDROCHLORIDE 5 MG/1
5 TABLET ORAL EVERY 6 HOURS PRN
Refills: 0 | Status: DISCONTINUED | OUTPATIENT
Start: 2025-01-31 | End: 2025-02-04 | Stop reason: HOSPADM

## 2025-01-31 RX ORDER — SODIUM CHLORIDE 9 MG/ML
75 INJECTION, SOLUTION INTRAVENOUS CONTINUOUS
Status: DISCONTINUED | OUTPATIENT
Start: 2025-01-31 | End: 2025-02-01

## 2025-01-31 RX ADMIN — INSULIN LISPRO 1 UNITS: 100 INJECTION, SOLUTION INTRAVENOUS; SUBCUTANEOUS at 16:38

## 2025-01-31 RX ADMIN — ACETAMINOPHEN 975 MG: 325 TABLET ORAL at 14:20

## 2025-01-31 RX ADMIN — PRAVASTATIN SODIUM 40 MG: 40 TABLET ORAL at 16:41

## 2025-01-31 RX ADMIN — ACETAMINOPHEN 975 MG: 325 TABLET ORAL at 21:47

## 2025-01-31 RX ADMIN — GLIPIZIDE 5 MG: 5 TABLET ORAL at 08:36

## 2025-01-31 RX ADMIN — INSULIN LISPRO 1 UNITS: 100 INJECTION, SOLUTION INTRAVENOUS; SUBCUTANEOUS at 11:37

## 2025-01-31 RX ADMIN — ENOXAPARIN SODIUM 30 MG: 30 INJECTION SUBCUTANEOUS at 08:35

## 2025-01-31 RX ADMIN — EMPAGLIFLOZIN 10 MG: 10 TABLET, FILM COATED ORAL at 16:40

## 2025-01-31 RX ADMIN — ALLOPURINOL 100 MG: 100 TABLET ORAL at 08:35

## 2025-01-31 RX ADMIN — Medication 250 MG: at 16:41

## 2025-01-31 RX ADMIN — EPLERENONE 25 MG: 25 TABLET, FILM COATED ORAL at 08:37

## 2025-01-31 RX ADMIN — OMEGA-3 FATTY ACIDS CAP 1000 MG 1000 MG: 1000 CAP at 08:36

## 2025-01-31 RX ADMIN — ASPIRIN 81 MG: 81 TABLET, COATED ORAL at 08:35

## 2025-01-31 RX ADMIN — TORSEMIDE 20 MG: 20 TABLET ORAL at 08:38

## 2025-01-31 RX ADMIN — HYDROMORPHONE HYDROCHLORIDE 0.2 MG: 0.2 INJECTION, SOLUTION INTRAMUSCULAR; INTRAVENOUS; SUBCUTANEOUS at 11:58

## 2025-01-31 RX ADMIN — INSULIN ASPART 50 UNITS: 100 INJECTION, SUSPENSION SUBCUTANEOUS at 16:39

## 2025-01-31 RX ADMIN — LORAZEPAM 1 MG: 2 INJECTION INTRAMUSCULAR; INTRAVENOUS at 11:38

## 2025-01-31 RX ADMIN — SODIUM CHLORIDE 75 ML/HR: 0.9 INJECTION, SOLUTION INTRAVENOUS at 11:58

## 2025-01-31 RX ADMIN — LOSARTAN POTASSIUM 50 MG: 50 TABLET, FILM COATED ORAL at 08:38

## 2025-01-31 RX ADMIN — GABAPENTIN 100 MG: 100 CAPSULE ORAL at 21:47

## 2025-01-31 RX ADMIN — INSULIN LISPRO 1 UNITS: 100 INJECTION, SOLUTION INTRAVENOUS; SUBCUTANEOUS at 02:21

## 2025-01-31 RX ADMIN — OXYCODONE HYDROCHLORIDE 5 MG: 5 TABLET ORAL at 23:20

## 2025-01-31 RX ADMIN — HYDROMORPHONE HYDROCHLORIDE 0.2 MG: 0.2 INJECTION, SOLUTION INTRAMUSCULAR; INTRAVENOUS; SUBCUTANEOUS at 02:13

## 2025-01-31 RX ADMIN — INSULIN LISPRO 1 UNITS: 100 INJECTION, SOLUTION INTRAVENOUS; SUBCUTANEOUS at 08:41

## 2025-01-31 RX ADMIN — Medication 250 MG: at 08:38

## 2025-01-31 RX ADMIN — INSULIN ASPART 50 UNITS: 100 INJECTION, SUSPENSION SUBCUTANEOUS at 08:41

## 2025-01-31 RX ADMIN — ACETAMINOPHEN 975 MG: 325 TABLET ORAL at 05:28

## 2025-01-31 RX ADMIN — OMEGA-3 FATTY ACIDS CAP 1000 MG 1000 MG: 1000 CAP at 16:41

## 2025-01-31 RX ADMIN — ALLOPURINOL 100 MG: 100 TABLET ORAL at 16:40

## 2025-01-31 RX ADMIN — METOPROLOL SUCCINATE 150 MG: 100 TABLET, EXTENDED RELEASE ORAL at 21:47

## 2025-01-31 NOTE — ASSESSMENT & PLAN NOTE
Patient presents with left leg pain goes up to his low back initially started 2 days ago, now having right sciatic pain, unable to walk due to pain.  Denies saddle anesthesia. Reports had a left foot surgery on 1/21, wears surgical shoe at home.  Reports history of low back pain 10 years ago, required steroid injection.   Chart reviewed.  Patient underwent excision exostosis/saucerization left foot on 1/21/2025.  Has surgical shoe at bedside.   CT lumbar spine showed - Chronic disc and facet degenerative change resulting in nerve root encroachment in the lower lumbar spine. L4-5 left paracentral to foraminal zone disc protrusion encroaching on the traversing left L5 nerve root. MRI may be helpful for further evaluation.   Pain control with ATC Tylenol, gabapentin, oxycodone and tolerated as needed  MRI of the lumbar spine.  Patient requesting sedation prior to MRI, Ativan ordered.  PT OT

## 2025-01-31 NOTE — ASSESSMENT & PLAN NOTE
Lab Results   Component Value Date    EGFR 36 01/30/2025    EGFR 39 12/30/2024    EGFR 42 (L) 09/16/2024    CREATININE 1.76 (H) 01/30/2025    CREATININE 1.63 (H) 12/30/2024    CREATININE 1.67 (H) 09/16/2024   Baseline creatinine appears to be around 1.4 -1.9 in past year.  Creatinine stable  Patient is on Demadex 20-10mg twice daily, Inspra 100mg p.o. twice daily at home.  Sees nephrology in [facility].  Continue Demadex from home.  Inspra dose renally adjusted per pharmacy recommendation.  BMP in the morning

## 2025-01-31 NOTE — ASSESSMENT & PLAN NOTE
Lab Results   Component Value Date    HGBA1C 8.2 (H) 12/30/2024       Recent Labs     01/30/25  2033 01/31/25  0211 01/31/25  0720 01/31/25  1057   POCGLU 199* 184* 221* 202*       Blood Sugar Average: Last 72 hrs:  (P) 201.5  On Farxiga, glipizide, Humalog 75/25 60-58 units twice daily at home.  Continue physical glipizide  Ordered NovoLog 70/30 50 units SQ twice daily before meals  SSI ACHS  Diabetic diet  Recent A1c 8.2

## 2025-01-31 NOTE — H&P
H&P - Hospitalist   Name: Stewart Flores 79 y.o. male I MRN: [id number]  Unit/Bed#: 2 [facility] 219 A  Date of Admission: 1/30/2025   Date of Service: 1/30/2025 I Hospital Day: 0     Assessment & Plan  Acute low back pain with right-sided sciatica  Patient presents with left leg pain goes up to his low back initially started 2 days ago, now having right sciatic pain, unable to walk due to pain.  Denies saddle anesthesia. Reports had a left foot surgery on 1/21, wears surgical shoe at home.  Reports history of low back pain 10 years ago, required steroid injection.   Chart reviewed.  Patient underwent excision exostosis/saucerization left foot on 1/21/2025.  Has surgical shoe at bedside.   CT lumbar spine showed - Chronic disc and facet degenerative change resulting in nerve root encroachment in the lower lumbar spine. L4-5 left paracentral to foraminal zone disc protrusion encroaching on the traversing left L5 nerve root. MRI may be helpful for further evaluation.   Pain control with ATC Tylenol, gabapentin, oxycodone and tolerated as needed  MRI of the lumbar spine.  Patient requesting sedation prior to MRI, Ativan ordered.  PT OT      Leukocytosis  WBC 12.52, no bands, patient afebrile.  Left foot x-ray no acute findings  Patient reports having 3 loose BM a day after left foot surgery recently  Suspect reactive  Repeat CBC in the morning  Start probiotic  Chronic kidney disease  Lab Results   Component Value Date    EGFR 36 01/30/2025    EGFR 39 12/30/2024    EGFR 42 (L) 09/16/2024    CREATININE 1.76 (H) 01/30/2025    CREATININE 1.63 (H) 12/30/2024    CREATININE 1.67 (H) 09/16/2024   Baseline creatinine appears to be around 1.4 -1.9 in past year.  Creatinine stable  Patient is on Demadex 20-10mg twice daily, Inspra 100mg p.o. twice daily at home.  Sees nephrology in [facility].  Continue Demadex from home.  Inspra dose renally adjusted per pharmacy recommendation.  BMP in the morning  Smoking - cigars daily  Declined  nicotine patch  Gout  Continue allopurinol  Diabetes mellitus, type 2 (HCC)  Lab Results   Component Value Date    HGBA1C 8.2 (H) 12/30/2024       Recent Labs     01/30/25 2033   POCGLU 199*       Blood Sugar Average: Last 72 hrs:  (P) 199  On Farxiga, glipizide, Humalog 75/25 60-58 units twice daily at home.  Continue physical glipizide  Ordered NovoLog 70/30 50 units SQ twice daily before meals  SSI ACHS  Diabetic diet  Recent A1c 8.2  HTN (hypertension)  Continue losartan  Also on Demadex and Inspra as above.  BP acceptable  Monitor  Hyperlipidemia  Continue statin fish oil  Bilateral leg edema  On Demadex Inspra as above.  No leg edema on exam  Status post left foot surgery  As above  Consult podiatry  Continue surgical shoe when out of bed      VTE Pharmacologic Prophylaxis: VTE Score: 5 High Risk (Score >/= 5) - Pharmacological DVT Prophylaxis Ordered: enoxaparin (Lovenox). Sequential Compression Devices Ordered.  Code Status: Level 1 - Full Code   Discussion with family: Updated  (wife) at bedside.    Anticipated Length of Stay: Patient will be admitted on an inpatient basis with an anticipated length of stay of greater than 2 midnights secondary to right sciatic.    History of Present Illness   Chief Complaint: Sciatic pain    Stewart Flores is a 79 y.o. male with a PMH of low back pain, type 2 diabetes, CKD 3, hyperlipidemia, hypertension, leg edema, obesity who presents with left leg pain goes up to his low back initially started 2 days ago, now having right sciatic pain, unable to walk due to pain.  Patient denies saddle anesthesia.  Denies trouble urinating or bowel incontinence.  Patient reports pain is better when laying down, sitting up worsens pain, unable to walk due to pain.  Patient reports he had left foot surgery on 1/21, wears surgical shoe at home, did not use assistive device at home postsurgery.  Reports history of low back pain 10 years ago, required steroid injection,  was told not a surgical candidate.  Patient reports having loose BM up to 3 times a day since left foot surgery.  Patient denies chest pain, headache, dizziness, SOB, nausea vomiting, fever chills.  Reports taking Tylenol at home for pain.      Review of Systems   Constitutional:  Positive for activity change.   Gastrointestinal:  Positive for diarrhea.   Musculoskeletal:  Positive for back pain.        Right sciatic.  Recent left foot surgery.   All other systems reviewed and are negative.      Historical Information   Past Medical History:   Diagnosis Date    Arthritis     Chronic kidney disease     Diabetes mellitus (HCC)     History of wound infection ?2013    RIGHT LOWER LEG. WAS + FOR STAPH INFECTION AT THAT TIME    Hypertension     Shoulder abrasion     right side after a fall in Feb 2018     Past Surgical History:   Procedure Laterality Date    BONE EXOSTOSIS EXCISION Left 1/21/2025    Procedure: EXCISION EXOSTOSIS/saucerization left foot;  Surgeon: Ollie White DPM;  Location: [facility] OR;  Service: Podiatry    CHOLECYSTECTOMY      COLONOSCOPY      KNEE ARTHROPLASTY Left 2014    NC EXC B9 LESION MRGN XCP SK TG S/N/H/F/G 1.1-2.0CM Left 07/30/2018    Procedure: EXCISIONAL BIOPSY BENIGN NEOPLASM OF SKIN EXTREMITY;  Surgeon: Julio Cesar Trejo Jr., DPM;  Location: [facility] OR;  Service: Podiatry    STEROID INJECTION SHOULDER Right     8 CERIVAL SPINE INJECTIONS    TONSILLECTOMY       Social History     Tobacco Use    Smoking status: Every Day     Types: Cigars    Smokeless tobacco: Never    Tobacco comments:     1 cigar a day.   Substance and Sexual Activity    Alcohol use: Yes     Alcohol/week: 5.0 standard drinks of alcohol     Types: 5 Cans of beer per week     Comment: DAY,SOMETIMES MORE PER PATIENT    Drug use: No    Sexual activity: Not on file     E-Cigarette/Vaping     E-Cigarette/Vaping Substances     Family history non-contributory  Social History:  Marital Status: /Civil Union   Occupation:  Retired  Patient Pre-hospital Living Situation: Home  Patient Pre-hospital Level of Mobility: walks  Patient Pre-hospital Diet Restrictions: Diabetic cardiac    Meds/Allergies   I have reviewed home medications with patient personally.  Prior to Admission medications    Medication Sig Start Date End Date Taking? Authorizing Provider   allopurinol (ZYLOPRIM) 100 mg tablet Take 100 mg by mouth 2 (two) times a day   Yes Historical Provider, MD   aspirin (ECOTRIN LOW STRENGTH) 81 mg EC tablet Take 81 mg by mouth daily   Yes Historical Provider, MD   dapagliflozin (Farxiga) 5 MG TABS Take by mouth daily   Yes Historical Provider, MD   eplerenone (INSPRA) 50 MG tablet Take 50 mg by mouth 2 (two) times a day Takes 2 tabs bid   Yes Historical Provider, MD   glipiZIDE (GLUCOTROL) 5 mg tablet Take 5 mg by mouth every morning   Yes Historical Provider, MD   insulin lispro protamine-insulin lispro (HumaLOG 75/25) 100 units/mL Inject 60 Units under the skin 2 (two) times a day before meals 60 units a.m. And 58 units pm   Yes Historical Provider, MD   losartan (COZAAR) 50 mg tablet Take 50 mg by mouth daily 6/11/23  Yes Historical Provider, MD   metoprolol succinate (TOPROL-XL) 100 mg 24 hr tablet Take 150 mg by mouth daily at bedtime 6/11/23  Yes Historical Provider, MD   omega-3-acid ethyl esters (LOVAZA) 1 g capsule Take 1 g by mouth 2 (two) times a day 7/6/23  Yes Historical Provider, MD   rosuvastatin (CRESTOR) 5 mg tablet Take 5 mg by mouth daily 6/11/23  Yes Historical Provider, MD   torsemide (DEMADEX) 20 mg tablet Take 20 mg by mouth 2 (two) times a day 1 tablet in AM and 1/2 tablet pm   Yes Historical Provider, MD   Continuous Glucose Sensor (FreeStyle [place] 2 Sensor) MISC USE 1 SENSOR EVERY 14 DAYS 11/30/24   Historical Provider, MD     Allergies   Allergen Reactions    Ibuprofen Other (See Comments)     To avoid due to kidney problems       Objective :  Temp:  [97.8 °F (36.6 °C)-98 °F (36.7 °C)] 98 °F (36.7 °C)  HR:   [58-64] 58  BP: (136-212)/(68-86) 136/72  Resp:  [18-19] 18  SpO2:  [97 %-98 %] 97 %  O2 Device: None (Room air)    Physical Exam  Vitals and nursing note reviewed.   Constitutional:       Appearance: He is well-developed. He is obese.   HENT:      Head: Normocephalic and atraumatic.   Neck:      Thyroid: No thyromegaly.      Vascular: No JVD.      Trachea: No tracheal deviation.   Cardiovascular:      Rate and Rhythm: Normal rate and regular rhythm.      Heart sounds: Normal heart sounds.   Pulmonary:      Effort: Pulmonary effort is normal. No respiratory distress.      Breath sounds: Normal breath sounds. No wheezing or rales.   Abdominal:      General: Bowel sounds are normal. There is no distension.      Palpations: Abdomen is soft.      Tenderness: There is no abdominal tenderness. There is no guarding.   Musculoskeletal:      Cervical back: Neck supple.      Right lower leg: No edema.      Comments: Left foot dressing intact.  No edema to legs.  Patient able to move both legs.  Lumbar spine nontender.   Skin:     General: Skin is warm and dry.   Neurological:      General: No focal deficit present.      Mental Status: He is alert and oriented to person, place, and time.   Psychiatric:         Mood and Affect: Mood normal.         Judgment: Judgment normal.          Lines/Drains:            Lab Results: I have reviewed the following results:  Results from last 7 days   Lab Units 01/30/25  1743   WBC Thousand/uL 12.52*   HEMOGLOBIN g/dL 15.4   HEMATOCRIT % 46.3   PLATELETS Thousands/uL 217   SEGS PCT % 73   LYMPHO PCT % 16   MONO PCT % 8   EOS PCT % 2     Results from last 7 days   Lab Units 01/30/25  1743   SODIUM mmol/L 141   POTASSIUM mmol/L 3.8   CHLORIDE mmol/L 107   CO2 mmol/L 21   BUN mg/dL 29*   CREATININE mg/dL 1.76*   ANION GAP mmol/L 13   CALCIUM mg/dL 10.3*   ALBUMIN g/dL 4.5   TOTAL BILIRUBIN mg/dL 0.49   ALK PHOS U/L 55   ALT U/L 24   AST U/L 23   GLUCOSE RANDOM mg/dL 135         Results from last  7 days   Lab Units 01/30/25 2033   POC GLUCOSE mg/dl 199*     Lab Results   Component Value Date    HGBA1C 8.2 (H) 12/30/2024    HGBA1C 8.4 (H) 09/16/2024    HGBA1C 9.1 (H) 03/09/2024           Imaging Results Review: I reviewed radiology reports from this admission including: CT lumbar spine, x-ray left foot, x-ray left tibia fibula.  Other Study Results Review: No additional pertinent studies reviewed.    Administrative Statements       ** Please Note: This note has been constructed using a voice recognition system. **

## 2025-01-31 NOTE — PLAN OF CARE
Problem: PHYSICAL THERAPY ADULT  Goal: Performs mobility at highest level of function for planned discharge setting.  See evaluation for individualized goals.  Description: Treatment/Interventions: Therapeutic exercise, LE strengthening/ROM, Functional transfer training, ADL retraining, Gait training, Bed mobility, Equipment eval/education, Patient/family training, Elevations, Compensatory technique education          See flowsheet documentation for full assessment, interventions and recommendations.  Note: Prognosis: Good  Problem List: Decreased strength, Decreased range of motion, Impaired balance, Decreased mobility, Pain, Obesity  Assessment: Pt is 79 y.o. male seen for PT evaluation s/p admit to [facility] on 1/30/2025 w/ Acute low back pain with right-sided sciatica. PT consulted to assess pt's functional mobility and d/c needs. Order placed for PT eval and tx, w/ activity as tolerated order.  Co-morbidities affecting patient's physical performance include: gout, DM, htn, B leg edema, recent L foot surgery .  Personal factors affecting patient at time of initial evaluation include: stairs to enter home and inability to ambulate household distances.     Prior to admission, patient was independent with functional mobility without assistive device and independent with ADLS.  Upon evaluation: Pt was able to transfer to a chair and the commode with supervision and UE support.  Pt declined walking.         Please find objective findings from Physical Therapy assessment regarding body systems outlined above with impairments and limitations including weakness, decreased ROM, impaired balance, pain, decreased activity tolerance, and fall risk.The Barthel Index was used as a functional outcome tool presenting with a score of Barthel Index Score: 55 today indicating marked limitations of functional mobility and ADLS.  Patient's clinical presentation is currently unstable/unpredictable as seen in patient's  presentation of changing level of pain, increased fall risk, new onset of impairment of functional mobility, and new onset of weakness. Pt would benefit from continued Physical Therapy treatment to address deficits as defined above and maximize level of functional mobility.     As demonstrated by objective findings, the assigned level of complexity for this evaluation is high.The patient's AM-[facility] Basic Mobility Inpatient Short Form Raw Score is 15. A Raw score of less than or equal to 16 suggests the patient may benefit from discharge to post-acute rehabilitation services. Please also refer to the recommendation of the Physical Therapist for safe discharge planning.        Rehab Resource Intensity Level, PT: III (Minimum Resource Intensity)    See flowsheet documentation for full assessment.

## 2025-01-31 NOTE — PHYSICAL THERAPY NOTE
"       PHYSICAL THERAPY EVALUATION/TREATMENT     01/31/25 0950   PT Last Visit   PT Visit Date 01/31/25   Note Type   Note type Evaluation   Pain Assessment   Pain Assessment Tool 0-10   Pain Score   (no pain at rest, 9/10 pain L lower anterior leg and R posterior leg with activity)   Pain Location/Orientation   (back/legs   Restrictions/Precautions   Other Precautions Pain;Fall Risk   Home Living   Type of Home House   Home Layout One level  (6 ARTURO)   Home Equipment Walker;Cane   Prior Function   Level of [place] Independent with ADLs;Independent with functional mobility.     Lives With Spouse   Receives Help From Family   Falls in the last 6 months 0   Vocational Retired   General   Additional Pertinent History Pt admitted with actute LBP with R sciatica.  MRI of the L spine is pending.  Pt reports a long chronic history of LBP with multiple injections. Of note, pt had a recent L foot surgery with a post op shoe, pt reports he is WBAT in shoe.   Cognition   Overall Cognitive Status WFL   Subjective   Subjective \"I want you to take me to the bathroom in a WC\"   RLE Assessment   RLE Assessment   (ROM limited but WFL/limited by pain.  MMT hip 3-/5, knee 3+/5, ankle 3-/5)   LLE Assessment   LLE Assessment   (ROM limited but WFL/limited by pain. MMT hip 3-/5, knee 3+/5, ankle 3-/5)   Bed Mobility   Supine to Sit 4  Minimal assistance   Additional items Bedrails;HOB elevated   Sit to Supine 5  Supervision   Additional items Bedrails;HOB elevated   Transfers   Sit to Stand 5  Supervision   Stand to Sit 5  Supervision   Stand pivot 5  Supervision   Additional items   (UE support on furniture)   Ambulation/Elevation   Gait Assistance   (Pt declined trying to walk)   Balance   Static Sitting Fair +   Static Standing Fair -  (Pt unable to stand erect due to pain, very flexed posture)   Assessment   Prognosis Good   Problem List Decreased strength;Decreased range of motion;Impaired balance;Decreased mobility;Pain;Obesity "   Assessment Pt is 79 y.o. male seen for PT evaluation s/p admit to [facility] on 1/30/2025 w/ Acute low back pain with right-sided sciatica. PT consulted to assess pt's functional mobility and d/c needs. Order placed for PT eval and tx, w/ activity as tolerated order.  Co-morbidities affecting patient's physical performance include: gout, DM, htn, B leg edema, recent L foot surgery .  Personal factors affecting patient at time of initial evaluation include: stairs to enter home and inability to ambulate household distances.        Prior to admission, patient was independent with functional mobility without assistive device and independent with ADLS.  Upon evaluation: Pt was able to transfer to a chair and the commode with supervision and UE support.  Pt declined walking.             Please find objective findings from Physical Therapy assessment regarding body systems outlined above with impairments and limitations including weakness, decreased ROM, impaired balance, pain, decreased activity tolerance, and fall risk.The Barthel Index was used as a functional outcome tool presenting with a score of Barthel Index Score: 55 today indicating marked limitations of functional mobility and ADLS.  Patient's clinical presentation is currently unstable/unpredictable as seen in patient's presentation of changing level of pain, increased fall risk, new onset of impairment of functional mobility, and new onset of weakness. Pt would benefit from continued Physical Therapy treatment to address deficits as defined above and maximize level of functional mobility.     As demonstrated by objective findings, the assigned level of complexity for this evaluation is high.    The patient's -[facility] Basic Mobility Inpatient Short Form Raw Score is 15. A Raw score of less than or equal to 16 suggests the patient may benefit from discharge to post-acute rehabilitation services. Please also refer to the recommendation of the Physical  "Therapist for safe discharge planning.   Goals   Patient Goals \"less pain\" \"find out what is wrong with my back\"   STG Expiration Date 02/07/25   Short Term Goal #1 1. independent bed mobility,   2. independent transfers,   3. independent ambulation with or without a device x 30 feet   LTG Expiration Date 02/14/25   Long Term Goal #1 1. independent up and down 6 steps so pt can enter and exit her home,   2. independent ambulation outdoor surfaces 150 feet with less than 3/10 pain   Plan   Treatment/Interventions Therapeutic exercise;LE strengthening/ROM;Functional transfer training;ADL retraining;Gait training;Bed mobility;Equipment eval/education;Patient/family training;Elevations;Compensatory technique education   PT Frequency 3-5x/wk   Discharge Recommendation   Rehab Resource Intensity Level, PT III (Minimum Resource Intensity)   AM-[facility] Basic Mobility Inpatient   Turning in Flat Bed Without Bedrails 3   Lying on Back to Sitting on Edge of Flat Bed Without Bedrails 2   Moving Bed to Chair 3   Standing Up From Chair Using Arms 3   Walk in Room 2   Climb 3-5 Stairs With Railing 2   Basic Mobility Inpatient Raw Score 15   Basic Mobility Standardized Score 36.97   [facility] Highest Level Of Mobility   -HL Goal 4: Move to chair/commode   -HL Achieved 4: Move to chair/commode   Barthel Index   Feeding 10   Bathing 0   Grooming Score 5   Dressing Score 5   Bladder Score 10   Bowels Score 10   Toilet Use Score 5   Transfers (Bed/Chair) Score 10   Mobility (Level Surface) Score 0   Stairs Score 0   Barthel Index Score 55   Additional Treatment Session   Start Time 0940   End Time 0950   Treatment Assessment S:  \"I can't walk\"   O: Pt encouraged to try to walk with a walker from the bathroom to his bed but declined.  Pt tranferred to the toilet with supervision and use of grab bars, pt wiped independently, pt transferred back to the  with supervision.    A:  Pt is able to tranfer with supervision, anticipate pt " would be able to walk short distances with a walker however pt declined.   P: Encourage OOB/ambualtion as tolerated.   End of Consult   Patient Position at End of Consult All needs within reach;Bed/Chair alarm activated;Supine   Licensure   [place] License Number  Charlotte You PT [id number]

## 2025-01-31 NOTE — ASSESSMENT & PLAN NOTE
Patient presents with left leg pain goes up to his low back initially started 2 days ago, now having right sciatic pain, unable to walk due to pain.  Denies saddle anesthesia. Reports had a left foot surgery on 1/21, wears surgical shoe at home.  Reports history of low back pain 10 years ago, required steroid injection.   Chart reviewed.  Patient underwent excision exostosis/saucerization left foot on 1/21/2025.  Has surgical shoe at bedside.   CT lumbar spine showed - Chronic disc and facet degenerative change resulting in nerve root encroachment in the lower lumbar spine. L4-5 left paracentral to foraminal zone disc protrusion encroaching on the traversing left L5 nerve root. MRI may be helpful for further evaluation.   Pain control with ATC Tylenol, gabapentin, oxycodone and tolerated as needed.  Increase oxycodone dose  MRI of the lumbar spine done today and results noted  Neurosurgery consultation.  Discussed with neurosurgery APN on-call via epic chat and according to him no need for immediate transfer  PT OT

## 2025-01-31 NOTE — PLAN OF CARE
Problem: PAIN - ADULT  Goal: Verbalizes/displays adequate comfort level or baseline comfort level  Description: Interventions:  - Encourage patient to monitor pain and request assistance  - Assess pain using appropriate pain scale  - Administer analgesics based on type and severity of pain and evaluate response  - Implement non-pharmacological measures as appropriate and evaluate response  - Consider cultural and social influences on pain and pain management  - Notify physician/advanced practitioner if interventions unsuccessful or patient reports new pain  Outcome: Progressing     Problem: SAFETY ADULT  Goal: Patient will remain free of falls  Description: INTERVENTIONS:  - Educate patient/family on patient safety including physical limitations  - Instruct patient to call for assistance with activity   - Consult OT/PT to assist with strengthening/mobility   - Keep Call bell within reach  - Keep bed low and locked with side rails adjusted as appropriate  - Keep care items and personal belongings within reach  - Initiate and maintain comfort rounds  - Make Fall Risk Sign visible to staff  - Offer Toileting every 2 Hours, in advance of need  - Initiate/Maintain bed alarm  - Obtain necessary fall risk management equipment: alarm  - Apply yellow socks and bracelet for high fall risk patients  - Consider moving patient to room near nurses station  Outcome: Progressing  Goal: Maintain or return to baseline ADL function  Description: INTERVENTIONS:  -  Assess patient's ability to carry out ADLs; assess patient's baseline for ADL function and identify physical deficits which impact ability to perform ADLs (bathing, care of mouth/teeth, toileting, grooming, dressing, etc.)  - Assess/evaluate cause of self-care deficits   - Assess range of motion  - Assess patient's mobility; develop plan if impaired  - Assess patient's need for assistive devices and provide as appropriate  - Encourage maximum independence but intervene and  supervise when necessary  - Involve family in performance of ADLs  - Assess for home care needs following discharge   - Consider OT consult to assist with ADL evaluation and planning for discharge  - Provide patient education as appropriate  Outcome: Progressing     Problem: DISCHARGE PLANNING  Goal: Discharge to home or other facility with appropriate resources  Description: INTERVENTIONS:  - Identify barriers to discharge w/patient and caregiver  - Arrange for needed discharge resources and transportation as appropriate  - Identify discharge learning needs (meds, wound care, etc.)  - Arrange for interpretive services to assist at discharge as needed  - Refer to Case Management Department for coordinating discharge planning if the patient needs post-hospital services based on physician/advanced practitioner order or complex needs related to functional status, cognitive ability, or social support system  Outcome: Progressing     Problem: Knowledge Deficit  Goal: Patient/family/caregiver demonstrates understanding of disease process, treatment plan, medications, and discharge instructions  Description: Complete learning assessment and assess knowledge base.  Interventions:  - Provide teaching at level of understanding  - Provide teaching via preferred learning methods  Outcome: Progressing     Problem: SKIN/TISSUE INTEGRITY - ADULT  Goal: Skin Integrity remains intact(Skin Breakdown Prevention)  Description: Assess:  -Perform Ramin assessment every 12  -Clean and moisturize skin every 12    -Assess extremities for adequate circulation and sensation     Bed Management:  -Have minimal linens on bed & keep smooth, unwrinkled  -Change linens as needed when moist or perspiring  -Avoid sitting or lying in one position for more than 2 hours while in bed  -Keep HOB at 30degrees     Toileting:  -Offer bedside commode  -Assess for incontinence every 2  -Use incontinent care products after each incontinent episode such as  foam    Activity:  -Mobilize patient 3 times a day  -Encourage activity and walks on unit  -Encourage or provide ROM exercises   -Turn and reposition patient every 2 Hours  -Use appropriate equipment to lift or move patient in bed  -Instruct/ Assist with weight shifting every 1 when out of bed in chair  -Consider limitation of chair time 3 hour intervals    Skin Care:  -Avoid use of baby powder, tape, friction and shearing, hot water or constrictive clothing  -Relieve pressure over bony prominences using cushion  -Do not massage red bony areas    Next Steps:  -Teach patient strategies to minimize risks such as weight shifting   -Consider consults to  interdisciplinary teams such as PT  Outcome: Progressing  Goal: Incision(s), wounds(s) or drain site(s) healing without S/S of infection  Description: INTERVENTIONS  - Assess and document dressing, incision, wound bed, drain sites and surrounding tissue  - Provide patient and family education  - Perform skin care/dressing changes every per podiatry  Outcome: Progressing     Problem: Prexisting or High Potential for Compromised Skin Integrity  Goal: Skin integrity is maintained or improved  Description: INTERVENTIONS:  - Identify patients at risk for skin breakdown  - Assess and monitor skin integrity  - Assess and monitor nutrition and hydration status  - Monitor labs   - Assess for incontinence   - Turn and reposition patient  - Assist with mobility/ambulation  - Relieve pressure over bony prominences  - Avoid friction and shearing  - Provide appropriate hygiene as needed including keeping skin clean and dry  - Evaluate need for skin moisturizer/barrier cream  - Collaborate with interdisciplinary team   - Patient/family teaching  - Consider wound care consult   Outcome: Progressing

## 2025-01-31 NOTE — TELEMEDICINE
e-Consult ([facility])     Consults     Contacted by PRANAV Contreras.    Stewart Flores 79 y.o. male MRN: [id number]  Unit/Bed#: 2 [address] Encounter: [id number]    Reason for Consult    Per provider report, patient presents with acute onset of back pain about 2 days prior. Reports some radiation from the L leg into the back. Difficult walking secondary to pain. No bowel or bladder dysfunction. No reported neurological weakness. History of back pain many years ago improved with injections.  S/p L foot surgery 1/21/2025. Available past medical history,social history, surgical history, medication list, drug allergies and review of systems were reviewed.    /81   Pulse 60   Temp (!) 97.4 °F (36.3 °C) (Temporal)   Resp 18   SpO2 96%      Clinical exam per provider report, no reported bowel or bladder dysfunction.  No reported saddle anesthesia.  Ambulation limited secondary to pain.  No obvious evidence of weakness.  Left foot dressings intact from recent podiatry surgery.    Imaging personally reviewed.  MRI lumbar spine 1/31/2025: Multilevel degenerative disease notably at L4-5 where there is central and left central disc protrusion resulting in severe stenosis and compression of the exiting nerve roots.  Degenerative changes L5-S1 with osteophytic ridging resulting in right greater than left foraminal stenosis.    Assessment and Recommendations    1.  Continue to monitor neurologic exam for any evidence of new deficits or worsening symptoms  2.  Continue multimodal pain regimen.  Some suggestions provided including increased gabapentin, addition of muscle relaxers and continue topical therapies  3.  MRI lumbar spine was reviewed which does show significant stenosis at L4-5 and degenerative changes at L5-S1.  4.  Based on presenting symptoms no evidence to necessitate emergent surgical needs.  No indication for transfer at this time.  5.  Could consider referral to pain management as injections have  worked and been beneficial for the patient in the past.  6.  Would continue with conservative measures as patient is not an appropriate surgical candidate secondary to to his uncontrolled diabetes.  7.  Will continue to be available as needed during the remainder of his hospitalization.  Please reach out and contact neurosurgery should new or worsening symptoms develop.    All questions answered. Provider is in agreement with the course of action. 11-20 minutes, >50% of the total time devoted to medical consultative verbal/EMR discussion between providers. Written report will be generated in the EMR.

## 2025-01-31 NOTE — PLAN OF CARE
Problem: PAIN - ADULT  Goal: Verbalizes/displays adequate comfort level or baseline comfort level  Description: Interventions:  - Encourage patient to monitor pain and request assistance  - Assess pain using appropriate pain scale  - Administer analgesics based on type and severity of pain and evaluate response  - Implement non-pharmacological measures as appropriate and evaluate response  - Consider cultural and social influences on pain and pain management  - Notify physician/advanced practitioner if interventions unsuccessful or patient reports new pain  Outcome: Progressing     Problem: SAFETY ADULT  Goal: Patient will remain free of falls  Description: INTERVENTIONS:  - Educate patient/family on patient safety including physical limitations  - Instruct patient to call for assistance with activity   - Consult OT/PT to assist with strengthening/mobility   - Keep Call bell within reach  - Keep bed low and locked with side rails adjusted as appropriate  - Keep care items and personal belongings within reach  - Initiate and maintain comfort rounds  - Make Fall Risk Sign visible to staff  - Offer Toileting every 2 Hours, in advance of need  - Initiate/Maintain bed alarm  - Obtain necessary fall risk management equipment: walker  - Apply yellow socks and bracelet for high fall risk patients  - Consider moving patient to room near nurses station  Outcome: Progressing  Goal: Maintain or return to baseline ADL function  Description: INTERVENTIONS:  -  Assess patient's ability to carry out ADLs; assess patient's baseline for ADL function and identify physical deficits which impact ability to perform ADLs (bathing, care of mouth/teeth, toileting, grooming, dressing, etc.)  - Assess/evaluate cause of self-care deficits   - Assess range of motion  - Assess patient's mobility; develop plan if impaired  - Assess patient's need for assistive devices and provide as appropriate  - Encourage maximum independence but intervene  and supervise when necessary  - Involve family in performance of ADLs  - Assess for home care needs following discharge   - Consider OT consult to assist with ADL evaluation and planning for discharge  - Provide patient education as appropriate  Outcome: Progressing  Goal: Maintains/Returns to pre admission functional level  Description: INTERVENTIONS:  - Perform AM-PAC 6 Click Basic Mobility/ Daily Activity assessment daily.  - Set and communicate daily mobility goal to care team and patient/family/caregiver.   - Collaborate with rehabilitation services on mobility goals if consulted  - Perform Range of Motion 2 times a day.  - Reposition patient every 2 hours.  - Dangle patient 2 times a day  - Stand patient 2 times a day  - Ambulate patient 2 times a day  - Out of bed to chair 2 times a day   - Out of bed for meals 2 times a day  - Out of bed for toileting  - Record patient progress and toleration of activity level   Outcome: Progressing     Problem: DISCHARGE PLANNING  Goal: Discharge to home or other facility with appropriate resources  Description: INTERVENTIONS:  - Identify barriers to discharge w/patient and caregiver  - Arrange for needed discharge resources and transportation as appropriate  - Identify discharge learning needs (meds, wound care, etc.)  - Arrange for interpretive services to assist at discharge as needed  - Refer to Case Management Department for coordinating discharge planning if the patient needs post-hospital services based on physician/advanced practitioner order or complex needs related to functional status, cognitive ability, or social support system  Outcome: Progressing     Problem: Knowledge Deficit  Goal: Patient/family/caregiver demonstrates understanding of disease process, treatment plan, medications, and discharge instructions  Description: Complete learning assessment and assess knowledge base.  Interventions:  - Provide teaching at level of understanding  - Provide teaching  via preferred learning methods  Outcome: Progressing     Problem: SKIN/TISSUE INTEGRITY - ADULT  Goal: Skin Integrity remains intact(Skin Breakdown Prevention)  Description: Assess:  -Perform Ramin assessment every shift  -Clean and moisturize skin every shift  -Inspect skin when repositioning, toileting, and assisting with ADLS    -Assess extremities for adequate circulation and sensation     Bed Management:  -Have minimal linens on bed & keep smooth, unwrinkled  -Change linens as needed when moist or perspiring  -Avoid sitting or lying in one position for more than 2 hours while in bed  -Keep HOB at 30 degrees     Toileting:  -Offer bedside commode  -Assess for incontinence every 2 to 3 hours  -Use incontinent care products after each incontinent episode    Activity:  -Mobilize patient 2 times a day  -Encourage activity and walks on unit  -Encourage or provide ROM exercises   -Turn and reposition patient every 2 Hours  -Use appropriate equipment to lift or move patient in bed  -Instruct/ Assist with weight shifting every 2 hours when out of bed in chair  -Consider limitation of chair time 2 hour intervals    Skin Care:  -Avoid use of baby powder, tape, friction and shearing, hot water or constrictive clothing  -Relieve pressure over bony prominences using wedges, waffle cushion  -Do not massage red bony areas    Next Steps:    -Consider consults to  interdisciplinary teams such as PT, wound care nurse  Outcome: Progressing  Goal: Incision(s), wounds(s) or drain site(s) healing without S/S of infection  Description: INTERVENTIONS  - Assess and document dressing, incision, wound bed, drain sites and surrounding tissue  - Provide patient and family education  - Perform skin care/dressing changes as ordered  Outcome: Progressing

## 2025-01-31 NOTE — TELEMEDICINE
Inpatient consult to Neurosurgery  Consult performed by: Adam Mccarthy PA-C  Consult ordered by: Avila Alfaro MD       Please see note placed earlier today.

## 2025-01-31 NOTE — ASSESSMENT & PLAN NOTE
Lab Results   Component Value Date    EGFR 34 01/31/2025    EGFR 36 01/30/2025    EGFR 39 12/30/2024    CREATININE 1.82 (H) 01/31/2025    CREATININE 1.76 (H) 01/30/2025    CREATININE 1.63 (H) 12/30/2024   Baseline creatinine appears to be around 1.4 -1.9 in past year.  Hold diuretics for now  Start gentle IV fluid hydration  Recheck labs in a.m.

## 2025-01-31 NOTE — PROGRESS NOTES
Progress Note - Hospitalist   Name: Stewart Flores 79 y.o. male I MRN: [id number]  Unit/Bed#: 2 [facility] 219 A  Date of Admission: 1/30/2025   Date of Service: 1/31/2025 I Hospital Day: 1    Assessment & Plan  Acute low back pain with right-sided sciatica  Patient presents with left leg pain goes up to his low back initially started 2 days ago, now having right sciatic pain, unable to walk due to pain.  Denies saddle anesthesia. Reports had a left foot surgery on 1/21, wears surgical shoe at home.  Reports history of low back pain 10 years ago, required steroid injection.   Chart reviewed.  Patient underwent excision exostosis/saucerization left foot on 1/21/2025.  Has surgical shoe at bedside.   CT lumbar spine showed - Chronic disc and facet degenerative change resulting in nerve root encroachment in the lower lumbar spine. L4-5 left paracentral to foraminal zone disc protrusion encroaching on the traversing left L5 nerve root. MRI may be helpful for further evaluation.   Pain control with ATC Tylenol, gabapentin, oxycodone and tolerated as needed.  Increase oxycodone dose  MRI of the lumbar spine done today and results noted  Neurosurgery consultation.  Discussed with neurosurgery APN on-call via epic chat and according to him no need for immediate transfer  PT OT    Leukocytosis  Check urinalysis  Acute kidney injury superimposed on chronic kidney disease  (HCC)  Lab Results   Component Value Date    EGFR 34 01/31/2025    EGFR 36 01/30/2025    EGFR 39 12/30/2024    CREATININE 1.82 (H) 01/31/2025    CREATININE 1.76 (H) 01/30/2025    CREATININE 1.63 (H) 12/30/2024   Baseline creatinine appears to be around 1.4 -1.9 in past year.  Hold diuretics for now  Start gentle IV fluid hydration  Recheck labs in a.m.  Smoking - cigars daily  Declined nicotine patch  Gout  Continue allopurinol  Diabetes mellitus, type 2 (HCC)  Lab Results   Component Value Date    HGBA1C 8.2 (H) 12/30/2024       Recent Labs      01/30/25  2033 01/31/25  0211 01/31/25  0720 01/31/25  1057   POCGLU 199* 184* 221* 202*       Blood Sugar Average: Last 72 hrs:  (P) 201.5  On Farxiga, glipizide, Humalog 75/25 60-58 units twice daily at home.  Continue physical glipizide  Ordered NovoLog 70/30 50 units SQ twice daily before meals  SSI ACHS  Diabetic diet  Recent A1c 8.2  HTN (hypertension)  BP acceptable  Monitor  Hyperlipidemia  Continue statin  Bilateral leg edema  No leg edema on exam  Status post left foot surgery  As above  Consult podiatry  Continue surgical shoe when out of bed    VTE Pharmacologic Prophylaxis: VTE Score: 5 lovenox    Mobility:   Basic Mobility Inpatient Raw Score: 15  -HLM Goal: 4: Move to chair/commode  -HLM Achieved: 4: Move to chair/commode      Patient Centered Rounds: I performed bedside rounds with nursing staff today.   Discussions with Specialists or Other Care Team Provider: neurosurgery    Current Length of Stay: 1 day(s)  Current Patient Status: Inpatient   Certification Statement: The patient will continue to require additional inpatient hospital stay due to back pain      Code Status: Level 1 - Full Code    Subjective   Back pain    Objective :  Temp:  [97.1 °F (36.2 °C)-98 °F (36.7 °C)] 97.4 °F (36.3 °C)  HR:  [56-60] 60  BP: (134-152)/(58-81) 134/81  Resp:  [18] 18  SpO2:  [95 %-97 %] 96 %  O2 Device: None (Room air)    There is no height or weight on file to calculate BMI.     Input and Output Summary (last 24 hours):   No intake or output data in the 24 hours ending 01/31/25 1448    Physical Exam  Vitals and nursing note reviewed.   Constitutional:       Appearance: He is well-developed. He is obese.   HENT:      Head: Normocephalic and atraumatic.   Neck:      Thyroid: No thyromegaly.      Vascular: No JVD.      Trachea: No tracheal deviation.   Cardiovascular:      Rate and Rhythm: Normal rate and regular rhythm.      Heart sounds: Normal heart sounds.   Pulmonary:      Effort: Pulmonary effort is  normal. No respiratory distress.      Breath sounds: Normal breath sounds. No wheezing or rales.   Abdominal:      General: Bowel sounds are normal. There is no distension.      Palpations: Abdomen is soft.      Tenderness: There is no abdominal tenderness. There is no guarding.   Musculoskeletal:      Cervical back: Neck supple.      Right lower leg: No edema.      Comments: Left foot dressing intact.  No edema to legs.  Patient able to move both legs.  Lumbar spine nontender.   Skin:     General: Skin is warm and dry.   Neurological:      General: No focal deficit present.      Mental Status: He is alert and oriented to person, place, and time.   Psychiatric:         Mood and Affect: Mood normal.         Judgment: Judgment normal.         Lab Results: I have reviewed the following results:   Results from last 7 days   Lab Units 01/31/25  0637 01/30/25  1743   WBC Thousand/uL 13.33* 12.52*   HEMOGLOBIN g/dL 15.4 15.4   HEMATOCRIT % 46.3 46.3   PLATELETS Thousands/uL 219 217   SEGS PCT %  --  73   LYMPHO PCT %  --  16   MONO PCT %  --  8   EOS PCT %  --  2     Results from last 7 days   Lab Units 01/31/25  0533 01/30/25  1743   SODIUM mmol/L 140 141   POTASSIUM mmol/L 3.9 3.8   CHLORIDE mmol/L 107 107   CO2 mmol/L 19* 21   BUN mg/dL 30* 29*   CREATININE mg/dL 1.82* 1.76*   ANION GAP mmol/L 14* 13   CALCIUM mg/dL 9.8 10.3*   ALBUMIN g/dL  --  4.5   TOTAL BILIRUBIN mg/dL  --  0.49   ALK PHOS U/L  --  55   ALT U/L  --  24   AST U/L  --  23   GLUCOSE RANDOM mg/dL 175* 135         Results from last 7 days   Lab Units 01/31/25  1057 01/31/25  0720 01/31/25  0211 01/30/25  2033   POC GLUCOSE mg/dl 202* 221* 184* 199*               Recent Cultures (last 7 days):           Last 24 Hours Medication List:     Current Facility-Administered Medications:     acetaminophen (TYLENOL) tablet 975 mg, Q8H JANELL    allopurinol (ZYLOPRIM) tablet 100 mg, BID    aspirin (ECOTRIN LOW STRENGTH) EC tablet 81 mg, Daily    Empagliflozin  (JARDIANCE) tablet 10 mg, Daily With Dinner    enoxaparin (LOVENOX) subcutaneous injection 30 mg, Daily    eplerenone (INSPRA) tablet 25 mg, Daily    fish oil capsule 1,000 mg, BID    gabapentin (NEURONTIN) capsule 100 mg, HS    glipiZIDE (GLUCOTROL) tablet 5 mg, QAM    HYDROmorphone HCl (DILAUDID) injection 0.2 mg, Q4H PRN    insulin aspart protamine-insulin aspart (NovoLOG 70/30) 100 units/mL subcutaneous injection 50 Units, BID AC    insulin lispro (HumALOG/ADMELOG) 100 units/mL subcutaneous injection 1-5 Units, TID AC **AND** Fingerstick Glucose (POCT), TID AC    insulin lispro (HumALOG/ADMELOG) 100 units/mL subcutaneous injection 1-5 Units, HS    insulin lispro (HumALOG/ADMELOG) 100 units/mL subcutaneous injection 1-5 Units, 0200    losartan (COZAAR) tablet 50 mg, Daily    metoprolol succinate (TOPROL-XL) 24 hr tablet 150 mg, HS    ondansetron (ZOFRAN) injection 4 mg, Q4H PRN    oxyCODONE (ROXICODONE) IR tablet 5 mg, Q6H PRN    oxyCODONE (ROXICODONE) split tablet 2.5 mg, Q6H PRN    pravastatin (PRAVACHOL) tablet 40 mg, Daily With Dinner    saccharomyces boulardii (FLORASTOR) capsule 250 mg, BID    sodium chloride 0.9 % infusion, Continuous, Last Rate: 75 mL/hr (01/31/25 1158)    [Held by provider] torsemide (DEMADEX) tablet 20 mg, Daily **AND** [Held by provider] torsemide (DEMADEX) tablet 10 mg, After Dinner    Administrative Statements     **Please Note: This note may have been constructed using a voice recognition system.**

## 2025-01-31 NOTE — ASSESSMENT & PLAN NOTE
Lab Results   Component Value Date    HGBA1C 8.2 (H) 12/30/2024       Recent Labs     01/30/25 2033   POCGLU 199*       Blood Sugar Average: Last 72 hrs:  (P) 199  On Farxiga, glipizide, Humalog 75/25 60-58 units twice daily at home.  Continue physical glipizide  Ordered NovoLog 70/30 50 units SQ twice daily before meals  SSI ACHS  Diabetic diet  Recent A1c 8.2

## 2025-01-31 NOTE — UTILIZATION REVIEW
Initial Clinical Review    Admission: Date/Time/Statement:   Admission Orders (From admission, onward)       Ordered        01/30/25 1841  INPATIENT ADMISSION  Once                          Orders Placed This Encounter   Procedures    INPATIENT ADMISSION     Standing Status:   Standing     Number of Occurrences:   1     Level of Care:   Med Surg [16]     Estimated length of stay:   More than 2 Midnights     Certification:   I certify that inpatient services are medically necessary for this patient for a duration of greater than two midnights. See H&P and MD Progress Notes for additional information about the patient's course of treatment.     ED Arrival Information       Expected   -    Arrival   1/30/2025 14:40    Acuity   Urgent              Means of arrival   Ambulance    Escorted by   [facility]    Service   Hospitalist    Admission type   Emergency              Arrival complaint   back pain             Chief Complaint   Patient presents with    Leg Pain     Patient c/o left ankle pain radiating up to his lower back starting two days ago.  States had surgery on his left foot on 1/21.       Initial Presentation:  79 yom to ER from home via EMS for worsening low back pain rating into the left leg that has led to inability ambulate at home. Hx low back pain, received injections about 10 years ago, recent surgery on his left heel for diabetic ulcer , sciatica. Presents hypertensive, L paraspinal musculature tenderness. Admission L tib/fib xray neg. L foot xray neg. CT lumbar spine: Chronic disc and facet degenerative change resulting in nerve root encroachment in the lower lumbar spine. L4-5 left paracentral to foraminal zone disc protrusion encroaching on the traversing left L5 nerve root. Labs: WBC 12.52, BUN 29, Cr 1.76, Ca 10.3.  Admitted to inpatient status for acute low back pain with R sided sciatica. Plan: pain mgt, therapies, accuchecks, podiatry consulted      Anticipated Length of  Stay/Certification Statement:   Patient will be admitted on an inpatient basis with an anticipated length of stay of greater than 2 midnights secondary to right sciatic.     Date: 1/31/25   Day 2:   Continues to require IV Dilaudid for pain control. Using IV Ativan for anxiety. Therapies ongoing to determine safe d/c plan. Continue pain mgt, monitor functional ability for safety, monitor VS, follow labs.       ED Treatment-Medication Administration from 01/30/2025 1440 to 01/30/2025 2003         Date/Time Order Dose Route Action     01/30/2025 1508 oxyCODONE (ROXICODONE) IR tablet 5 mg 5 mg Oral Given     01/30/2025 1509 lidocaine (LIDODERM) 5 % patch 1 patch 1 patch Topical Medication Applied            Scheduled Medications:  Medications 01/22 01/23 01/24 01/25 01/26 01/27 01/28 01/29 01/30 01/31   acetaminophen (TYLENOL) tablet 975 mg  Dose: 975 mg  Freq: Every 8 hours scheduled Route: PO  Indications Comment: Around the clock pain.  Start: 01/30/25 2200            2120      0528     1400     2200        allopurinol (ZYLOPRIM) tablet 100 mg  Dose: 100 mg  Freq: 2 times daily Route: PO  Start: 01/31/25 0900             0835     1800        aspirin (ECOTRIN LOW STRENGTH) EC tablet 81 mg  Dose: 81 mg  Freq: Daily Route: PO  Start: 01/31/25 0900   Admin Instructions:                0835        ceFAZolin (ANCEF) IVPB (premix in dextrose) 2,000 mg 50 mL  Dose: 2,000 mg  Freq: Once Route: IV  Indications of Use: PROPHYLAXIS  Start: 01/21/25 0730 End: 01/21/25 0927   Admin Instructions:      Order specific questions:                   chlorhexidine (PERIDEX) 0.12 % oral rinse 15 mL  Dose: 15 mL  Freq: Once Route: SWISH & SPIT  Start: 01/21/25 0730 End: 01/21/25 0739   Admin Instructions:                   Empagliflozin (JARDIANCE) tablet 10 mg  Dose: 10 mg  Freq: Daily with dinner Route: PO  Start: 01/31/25 1630   Order specific questions:                1630        enoxaparin (LOVENOX) subcutaneous injection 30  mg  Dose: 30 mg  Freq: Daily Route: SC  Start: 01/31/25 0900   Admin Instructions:                0835        eplerenone (INSPRA) tablet 25 mg  Dose: 25 mg  Freq: Daily Route: PO  Start: 01/31/25 0900   Admin Instructions:      Order specific questions:                0837        eplerenone (INSPRA) tablet 50 mg  Dose: 50 mg  Freq: 2 times daily Route: PO  Start: 01/31/25 0900 End: 01/30/25 2036   Admin Instructions:      Order specific questions:               2036-D/C'd       fish oil capsule 1,000 mg  Dose: 1,000 mg  Freq: 2 times daily Route: PO  Start: 01/31/25 0900   Admin Instructions:                0836     1800        fish oil capsule 1,000 mg  Dose: 1,000 mg  Freq: 2 times daily Route: PO  Start: 01/30/25 2030 End: 01/30/25 2020   Admin Instructions:               2020-D/C'd       gabapentin (NEURONTIN) capsule 100 mg  Dose: 100 mg  Freq: Daily at bedtime Route: PO  Start: 01/30/25 2200   Admin Instructions:               2120 2200        glipiZIDE (GLUCOTROL) tablet 5 mg  Dose: 5 mg  Freq: Every morning Route: PO  Start: 01/31/25 0900   Admin Instructions:                0836        insulin aspart protamine-insulin aspart (NovoLOG 70/30) 100 units/mL subcutaneous injection 50 Units  Dose: 50 Units  Freq: 2 times daily before meals Route: SC  Start: 01/31/25 0700   Admin Instructions:                0841     1600        insulin lispro (HumALOG/ADMELOG) 100 units/mL subcutaneous injection 1-5 Units  Dose: 1-5 Units  Freq: Daily  (0200) Route: SC  Start: 01/31/25 0200   Admin Instructions:                0221        insulin lispro (HumALOG/ADMELOG) 100 units/mL subcutaneous injection 1-5 Units  Dose: 1-5 Units  Freq: Daily at bedtime Route: SC  Start: 01/30/25 2200   Admin Instructions:               2123 [C]      2200         insulin lispro (HumALOG/ADMELOG) 100 units/mL subcutaneous injection 1-5 Units  Dose: 1-5 Units  Freq: 3 times daily before meals Route: SC  Start: 01/31/25 0700   Admin  Instructions:                0841     1137     1600        lidocaine (LIDODERM) 5 % patch 1 patch  Dose: 1 patch  Freq: Once Route: TP  Start: 01/30/25 1515 End: 01/31/25 0309   Admin Instructions:      Order specific questions:               1509      0345 [C]        losartan (COZAAR) tablet 50 mg  Dose: 50 mg  Freq: Daily Route: PO  Start: 01/31/25 0900   Order specific questions:                0838        metoprolol succinate (TOPROL-XL) 24 hr tablet 150 mg  Dose: 150 mg  Freq: Daily at bedtime Route: PO  Start: 01/31/25 2200   Admin Instructions:      Order specific questions:                2200        ondansetron (ZOFRAN) injection 4 mg  Dose: 4 mg  Freq: Once Route: IV  Indications of Use: POSTOPERATIVE NAUSEA AND VOMITING  Start: 01/21/25 1030 End: 01/21/25 1142   Admin Instructions:                   oxyCODONE (ROXICODONE) IR tablet 5 mg  Dose: 5 mg  Freq: Once Route: PO  Start: 01/30/25 1515 End: 01/30/25 1508   Admin Instructions:               1508         pravastatin (PRAVACHOL) tablet 40 mg  Dose: 40 mg  Freq: Daily with dinner Route: PO  Start: 01/31/25 1630             1630        saccharomyces boulardii (FLORASTOR) capsule 250 mg  Dose: 250 mg  Freq: 2 times daily Route: PO  Start: 01/31/25 0900             0838     1800         torsemide (DEMADEX) tablet 20 mg  Dose: 20 mg  Freq: Daily Route: PO  Indications of Use: EDEMA  Start: 01/31/25 0900   Order specific questions:                0838     1144        And   torsemide (DEMADEX) tablet 10 mg  Dose: 10 mg  Freq: Daily after dinner Route: PO  Start: 01/31/25 1800   Order specific questions:                1144     1800                    Continuous Meds Sorted by Name  for Stewart Flores as of 01/22/25 through 1/31/25  Legend:       Medications 01/22 01/23 01/24 01/25 01/26 01/27 01/28 01/29 01/30 01/31   lactated ringers infusion  Rate: 75 mL/hr Dose: 75 mL/hr  Freq: Continuous Route: IV  Indications of Use: IV Hydration  Last Dose: 400  mL/hr (01/21/25 1012)  Start: 01/21/25 0730 End: 01/21/25 1343                sodium chloride 0.9 % infusion  Rate: 75 mL/hr Dose: 75 mL/hr  Freq: Continuous Route: IV  Last Dose: 75 mL/hr (01/31/25 1158)  Start: 01/31/25 1200 End: 02/01/25 1157             1158                    PRN Meds Sorted by Name  for Stewart Flores as of 01/22/25 through 1/31/25  Legend:       Medications 01/22 01/23 01/24 01/25 01/26 01/27 01/28 01/29 01/30 01/31   bupivacaine (PF) (MARCAINE) 0.25 % 1 mL, lidocaine (PF) (XYLOCAINE-MPF) 1 % 1 mL infiltration  Freq: As needed  Start: 01/21/25 1015 End: 01/21/25 1025                chlorhexidine gluconate (HIBICLENS) 4 % topical liquid  Freq: Daily PRN Route: TP  PRN Comment: Surgical Scrub  Start: 01/21/25 0716 End: 01/21/25 1343   Admin Instructions:                   ePHEDrine injection  Freq: As needed Route: IV  Start: 01/21/25 0943 End: 01/21/25 1018                fentaNYL (SUBLIMAZE) injection 25 mcg  Dose: 25 mcg  Freq: Every 5 minutes PRN Route: IV  PRN Reason: Pain - PACU  PRN Comment: Breakthrough - first line  Indications of Use: PAIN  Start: 01/21/25 1025 End: 01/21/25 1142   Admin Instructions:                   fentaNYL injection  Freq: As needed Route: IV  Start: 01/21/25 0932 End: 01/21/25 1018                HYDROmorphone HCl (DILAUDID) injection 0.2 mg  Dose: 0.2 mg  Freq: Every 4 hours PRN Route: IV  PRN Reason: breakthrough pain  Start: 01/30/25 2021   Admin Instructions:                0213     1158        lidocaine (PF) (XYLOCAINE-MPF) 1 % injection  Freq: As needed Route: IV  Start: 01/21/25 0932 End: 01/21/25 1018                LORazepam (ATIVAN) injection 1 mg  Dose: 1 mg  Freq: Once as needed Route: IV  PRN Reason: anxiety  PRN Comment: prior to MRI  Start: 01/30/25 2024 End: 01/31/25 1138   Admin Instructions:                1138        metoclopramide (REGLAN) injection  Freq: As needed Route: IV  Start: 01/21/25 0929 End: 01/21/25 1018                 midazolam (VERSED) injection  Freq: As needed Route: IV  Start: 01/21/25 0928 End: 01/21/25 1018                ondansetron (ZOFRAN) injection  Freq: As needed Route: IV  Start: 01/21/25 0929 End: 01/21/25 1018                ondansetron (ZOFRAN) injection 4 mg  Dose: 4 mg  Freq: Every 4 hours PRN Route: IV  PRN Reasons: nausea,vomiting  Start: 01/30/25 2020   Admin Instructions:                   oxyCODONE (ROXICODONE) split tablet 2.5 mg  Dose: 2.5 mg  Freq: Every 6 hours PRN Route: PO  PRN Reasons: moderate pain,severe pain  Start: 01/30/25 2021   Admin Instructions:               2119         phenylephrine 1,000 mcg/10 mL prefilled syringe  Freq: As needed Route: IV  Start: 01/21/25 0941 End: 01/21/25 1018                propofol (DIPRIVAN) 1000 mg in 100 mL infusion (premix)  Freq: As needed Route: IV  Start: 01/21/25 0932 End: 01/21/25 1018                sodium chloride 0.9 % irrigation solution  Freq: As needed  Start: 01/21/25 1002 End: 01/21/25 1025                Legend:       [id number]/2201/2301/2401/2501/2601/2701/2801/2901/3001/31          ED Triage Vitals [01/30/25 1444]   Temperature Pulse Respirations Blood Pressure SpO2 Pain Score   97.8 °F (36.6 °C) 64 19 (!) 212/86 98 % 9     Weight (last 2 days)       None            Vital Signs (last 3 days)       Date/Time Temp Pulse Resp BP MAP (mmHg) SpO2 O2 Device Patient Position - Orthostatic VS Pain    01/31/25 08:33:45 -- -- -- 134/81 99 -- -- -- --    01/31/25 0528 -- -- -- -- -- -- -- -- 3    01/31/25 02:13:12 97.1 °F (36.2 °C) 56 18 134/58 83 95 % None (Room air) Lying --    01/31/25 0213 -- -- -- -- -- -- -- -- 7    01/30/25 2120 -- -- -- -- -- -- -- -- 7    01/30/25 2119 -- -- -- -- -- -- -- -- 7    01/30/25 2035 -- -- -- -- -- -- -- -- No Pain    01/30/25 20:13:31 98 °F (36.7 °C) -- 18 136/72 93 -- -- -- --    01/30/25 1847 -- 58 18 152/68 -- 97 % None (Room air) Lying --    01/30/25 1444 97.8 °F (36.6 °C) 64 19 212/86 -- 98 % None (Room  air) Lying 9              Pertinent Labs/Diagnostic Test Results:   Radiology:  CT lumbar spine without contrast   Final Interpretation by Ricky Larson MD (01/30 1830)      Chronic disc and facet degenerative change resulting in nerve root encroachment in the lower lumbar spine.      L4-5 left paracentral to foraminal zone disc protrusion encroaching on the traversing left L5 nerve root. MRI may be helpful for further evaluation.      Workstation performed: [id number]         XR tibia fibula 2 views LEFT   Final Interpretation by Izabel Vu MD (01/30 1609)      No acute osseous abnormality.            Computerized Assisted Algorithm (CAA) may have been used to analyze all applicable images.               Workstation performed: [id number]         XR foot 3+ vw left   Final Interpretation by Izabel Vu MD (01/30 1608)      No acute osseous abnormality.         Computerized Assisted Algorithm (CAA) may have been used to analyze all applicable images.         Workstation performed: [id number]         MRI lumbar spine wo contrast    (Results Pending)     Cardiology:  No orders to display     GI:  No orders to display           Results from last 7 days   Lab Units 01/31/25  0637 01/30/25  1743   WBC Thousand/uL 13.33* 12.52*   HEMOGLOBIN g/dL 15.4 15.4   HEMATOCRIT % 46.3 46.3   PLATELETS Thousands/uL 219 217   TOTAL NEUT ABS Thousands/µL  --  9.19*         Results from last 7 days   Lab Units 01/31/25  0533 01/30/25  1743   SODIUM mmol/L 140 141   POTASSIUM mmol/L 3.9 3.8   CHLORIDE mmol/L 107 107   CO2 mmol/L 19* 21   ANION GAP mmol/L 14* 13   BUN mg/dL 30* 29*   CREATININE mg/dL 1.82* 1.76*   EGFR ml/min/1.73sq m 34 36   CALCIUM mg/dL 9.8 10.3*   MAGNESIUM mg/dL 2.0  --      Results from last 7 days   Lab Units 01/30/25  1743   AST U/L 23   ALT U/L 24   ALK PHOS U/L 55   TOTAL PROTEIN g/dL 7.4   ALBUMIN g/dL 4.5   TOTAL BILIRUBIN mg/dL 0.49   BILIRUBIN DIRECT mg/dL 0.10     Results from last 7 days  "  Lab Units 01/31/25  0720 01/31/25  0211 01/30/25  2033   POC GLUCOSE mg/dl 221* 184* 199*     Results from last 7 days   Lab Units 01/31/25  0533 01/30/25  1743   GLUCOSE RANDOM mg/dL 175* 135             No results found for: \"BETA-HYDROXYBUTYRATE\"                                                                                                                                         Past Medical History:   Diagnosis Date    Arthritis     Chronic kidney disease     Diabetes mellitus (HCC)     History of wound infection ?2013    RIGHT LOWER LEG. WAS + FOR STAPH INFECTION AT THAT TIME    Hypertension     Shoulder abrasion     right side after a fall in Feb 2018     Present on Admission:   Smoking - cigars daily   Gout   Diabetes mellitus, type 2 (HCC)   HTN (hypertension)   Hyperlipidemia   Chronic kidney disease      Admitting Diagnosis: Sciatica [M54.30]  Leg pain [M79.606]  Ambulatory dysfunction [R26.2]  Age/Sex: 79 y.o. male    Network Utilization Review Department  ATTENTION: Please call with any questions or concerns to [phone] and carefully listen to the prompts so that you are directed to the right person. All voicemails are confidential.   For Discharge needs, contact Care Management DC Support Team at [phone] opt. [phone]  Send all requests for admission clinical reviews, approved or denied determinations and any other requests to dedicated fax number below belonging to the campus where the patient is receiving treatment. List of dedicated fax numbers for the Facilities:  FACILITY NAME UR FAX NUMBER   ADMISSION DENIALS (Administrative/Medical Necessity) [phone]   DISCHARGE SUPPORT TEAM (NETWORK) [phone]   PARENT CHILD HEALTH (Maternity/NICU/Pediatrics) [phone]   [facility] [phone]   [facility] [phone]   [facility] [phone]   [facility] [phone]   [facility]" [facility] [phone]   [facility] [phone]   [facility] [phone]   [facility] [phone]   [facility] [phone]   formerly [facility] [phone]   [facility] [phone]   [facility] [phone]

## 2025-01-31 NOTE — ASSESSMENT & PLAN NOTE
WBC 12.52, no bands, patient afebrile.  Left foot x-ray no acute findings  Patient reports having 3 loose BM a day after left foot surgery recently  Suspect reactive  Repeat CBC in the morning  Start probiotic

## 2025-02-01 LAB
ANION GAP SERPL CALCULATED.3IONS-SCNC: 12 MMOL/L (ref 4–13)
BASOPHILS # BLD AUTO: 0.05 THOUSANDS/ΜL (ref 0–0.1)
BASOPHILS NFR BLD AUTO: 1 % (ref 0–1)
BUN SERPL-MCNC: 29 MG/DL (ref 5–25)
CALCIUM SERPL-MCNC: 9.2 MG/DL (ref 8.4–10.2)
CHLORIDE SERPL-SCNC: 111 MMOL/L (ref 96–108)
CO2 SERPL-SCNC: 18 MMOL/L (ref 21–32)
CREAT SERPL-MCNC: 1.6 MG/DL (ref 0.6–1.3)
EOSINOPHIL # BLD AUTO: 0.26 THOUSAND/ΜL (ref 0–0.61)
EOSINOPHIL NFR BLD AUTO: 3 % (ref 0–6)
ERYTHROCYTE [DISTWIDTH] IN BLOOD BY AUTOMATED COUNT: 13.7 % (ref 11.6–15.1)
GFR SERPL CREATININE-BSD FRML MDRD: 40 ML/MIN/1.73SQ M
GLUCOSE SERPL-MCNC: 106 MG/DL (ref 65–140)
GLUCOSE SERPL-MCNC: 115 MG/DL (ref 65–140)
GLUCOSE SERPL-MCNC: 125 MG/DL (ref 65–140)
GLUCOSE SERPL-MCNC: 133 MG/DL (ref 65–140)
GLUCOSE SERPL-MCNC: 165 MG/DL (ref 65–140)
GLUCOSE SERPL-MCNC: 170 MG/DL (ref 65–140)
HCT VFR BLD AUTO: 43.7 % (ref 36.5–49.3)
HGB BLD-MCNC: 14.4 G/DL (ref 12–17)
IMM GRANULOCYTES # BLD AUTO: 0.04 THOUSAND/UL (ref 0–0.2)
IMM GRANULOCYTES NFR BLD AUTO: 0 % (ref 0–2)
LYMPHOCYTES # BLD AUTO: 2.15 THOUSANDS/ΜL (ref 0.6–4.47)
LYMPHOCYTES NFR BLD AUTO: 21 % (ref 14–44)
MCH RBC QN AUTO: 31.3 PG (ref 26.8–34.3)
MCHC RBC AUTO-ENTMCNC: 33 G/DL (ref 31.4–37.4)
MCV RBC AUTO: 95 FL (ref 82–98)
MONOCYTES # BLD AUTO: 0.87 THOUSAND/ΜL (ref 0.17–1.22)
MONOCYTES NFR BLD AUTO: 9 % (ref 4–12)
NEUTROPHILS # BLD AUTO: 6.69 THOUSANDS/ΜL (ref 1.85–7.62)
NEUTS SEG NFR BLD AUTO: 66 % (ref 43–75)
NRBC BLD AUTO-RTO: 0 /100 WBCS
PLATELET # BLD AUTO: 171 THOUSANDS/UL (ref 149–390)
PMV BLD AUTO: 9.8 FL (ref 8.9–12.7)
POTASSIUM SERPL-SCNC: 3.5 MMOL/L (ref 3.5–5.3)
RBC # BLD AUTO: 4.6 MILLION/UL (ref 3.88–5.62)
SODIUM SERPL-SCNC: 141 MMOL/L (ref 135–147)
WBC # BLD AUTO: 10.06 THOUSAND/UL (ref 4.31–10.16)

## 2025-02-01 PROCEDURE — 82948 REAGENT STRIP/BLOOD GLUCOSE: CPT

## 2025-02-01 PROCEDURE — 99232 SBSQ HOSP IP/OBS MODERATE 35: CPT | Performed by: HOSPITALIST

## 2025-02-01 PROCEDURE — 85025 COMPLETE CBC W/AUTO DIFF WBC: CPT | Performed by: HOSPITALIST

## 2025-02-01 PROCEDURE — 80048 BASIC METABOLIC PNL TOTAL CA: CPT | Performed by: HOSPITALIST

## 2025-02-01 RX ORDER — CYCLOBENZAPRINE HCL 10 MG
10 TABLET ORAL 3 TIMES DAILY PRN
Status: DISCONTINUED | OUTPATIENT
Start: 2025-02-01 | End: 2025-02-04 | Stop reason: HOSPADM

## 2025-02-01 RX ADMIN — METOPROLOL SUCCINATE 150 MG: 100 TABLET, EXTENDED RELEASE ORAL at 21:47

## 2025-02-01 RX ADMIN — ACETAMINOPHEN 975 MG: 325 TABLET ORAL at 21:47

## 2025-02-01 RX ADMIN — ASPIRIN 81 MG: 81 TABLET, COATED ORAL at 07:58

## 2025-02-01 RX ADMIN — INSULIN ASPART 50 UNITS: 100 INJECTION, SUSPENSION SUBCUTANEOUS at 17:33

## 2025-02-01 RX ADMIN — OMEGA-3 FATTY ACIDS CAP 1000 MG 1000 MG: 1000 CAP at 07:59

## 2025-02-01 RX ADMIN — ENOXAPARIN SODIUM 30 MG: 30 INJECTION SUBCUTANEOUS at 08:00

## 2025-02-01 RX ADMIN — INSULIN LISPRO 1 UNITS: 100 INJECTION, SOLUTION INTRAVENOUS; SUBCUTANEOUS at 17:35

## 2025-02-01 RX ADMIN — Medication 250 MG: at 08:01

## 2025-02-01 RX ADMIN — ACETAMINOPHEN 975 MG: 325 TABLET ORAL at 13:30

## 2025-02-01 RX ADMIN — EPLERENONE 25 MG: 25 TABLET, FILM COATED ORAL at 07:59

## 2025-02-01 RX ADMIN — ACETAMINOPHEN 975 MG: 325 TABLET ORAL at 05:33

## 2025-02-01 RX ADMIN — LOSARTAN POTASSIUM 50 MG: 50 TABLET, FILM COATED ORAL at 08:00

## 2025-02-01 RX ADMIN — ALLOPURINOL 100 MG: 100 TABLET ORAL at 17:34

## 2025-02-01 RX ADMIN — INSULIN LISPRO 1 UNITS: 100 INJECTION, SOLUTION INTRAVENOUS; SUBCUTANEOUS at 07:57

## 2025-02-01 RX ADMIN — Medication 250 MG: at 17:35

## 2025-02-01 RX ADMIN — OXYCODONE HYDROCHLORIDE 5 MG: 5 TABLET ORAL at 19:47

## 2025-02-01 RX ADMIN — OMEGA-3 FATTY ACIDS CAP 1000 MG 1000 MG: 1000 CAP at 17:33

## 2025-02-01 RX ADMIN — INSULIN ASPART 50 UNITS: 100 INJECTION, SUSPENSION SUBCUTANEOUS at 07:56

## 2025-02-01 RX ADMIN — ALLOPURINOL 100 MG: 100 TABLET ORAL at 07:58

## 2025-02-01 RX ADMIN — GABAPENTIN 100 MG: 100 CAPSULE ORAL at 21:46

## 2025-02-01 RX ADMIN — EMPAGLIFLOZIN 10 MG: 10 TABLET, FILM COATED ORAL at 17:34

## 2025-02-01 RX ADMIN — SODIUM CHLORIDE 75 ML/HR: 0.9 INJECTION, SOLUTION INTRAVENOUS at 03:09

## 2025-02-01 RX ADMIN — PRAVASTATIN SODIUM 40 MG: 40 TABLET ORAL at 17:35

## 2025-02-01 RX ADMIN — OXYCODONE HYDROCHLORIDE 5 MG: 5 TABLET ORAL at 05:36

## 2025-02-01 RX ADMIN — GLIPIZIDE 5 MG: 5 TABLET ORAL at 08:00

## 2025-02-01 NOTE — ASSESSMENT & PLAN NOTE
Lab Results   Component Value Date    HGBA1C 8.2 (H) 12/30/2024       Recent Labs     01/31/25  1557 01/31/25  2143 02/01/25  0249 02/01/25  0738   POCGLU 157* 84 133 165*       Blood Sugar Average: Last 72 hrs:  (P) 168.125  On Farxiga, glipizide, Humalog 75/25 60-58 units twice daily at home.  Continue physical glipizide  Continue NovoLog 70/30 50 units SQ twice daily before meals  SSI ACHS  Diabetic diet  Recent A1c 8.2

## 2025-02-01 NOTE — PROGRESS NOTES
Progress Note - Hospitalist   Name: Stewart Flores 79 y.o. male I MRN: [id number]  Unit/Bed#: 2 [facility] 219 A  Date of Admission: 1/30/2025   Date of Service: 2/1/2025 I Hospital Day: 2    Assessment & Plan  Acute low back pain with right-sided sciatica  Pain control with ATC Tylenol, gabapentin, oxycodone and tolerated as needed.  Added Flexeril as needed for spasms  MRI of the lumbar spine: Multilevel degenerative discogenic disease, most notably at L4-L5 where there is a central and left central disc protrusion resulting in severe spinal stenosis and compression of the nerve roots of the cauda equina. Surgical consultation is recommended. Additional findings of degenerative discogenic disease at L5-S1 with endplate osteophytic ridging and facet arthropathy resulting in right greater than left foraminal stenosis and bilateral subarticular zone encroachment. Correlate for right greater than left L5 and bilateral S1 radiculitis.  Neurosurgery consultation appreciated  PT OT  Leukocytosis  Resolved.  Likely reactive  Acute kidney injury superimposed on chronic kidney disease  (HCC)  Lab Results   Component Value Date    EGFR 40 02/01/2025    EGFR 34 01/31/2025    EGFR 36 01/30/2025    CREATININE 1.60 (H) 02/01/2025    CREATININE 1.82 (H) 01/31/2025    CREATININE 1.76 (H) 01/30/2025   Baseline creatinine appears to be around 1.4 -1.9 in past year.  Hold diuretics for now  Continue IV fluids  Recheck labs in a.m.  Smoking - cigars daily  Declined nicotine patch  Gout  Continue allopurinol  Diabetes mellitus, type 2 (HCC)  Lab Results   Component Value Date    HGBA1C 8.2 (H) 12/30/2024       Recent Labs     01/31/25  1557 01/31/25  2143 02/01/25  0249 02/01/25  0738   POCGLU 157* 84 133 165*       Blood Sugar Average: Last 72 hrs:  (P) 168.125  On Farxiga, glipizide, Humalog 75/25 60-58 units twice daily at home.  Continue physical glipizide  Continue NovoLog 70/30 50 units SQ twice daily before meals  SSI  ACHS  Diabetic diet  Recent A1c 8.2  HTN (hypertension)  BP acceptable  Monitor  Hyperlipidemia  Continue statin  Bilateral leg edema  No leg edema on exam  Status post left foot surgery  As above  Podiatry consultation appreciated  Continue surgical shoe when out of bed    VTE Pharmacologic Prophylaxis: VTE Score: 5 lovenox    Mobility:   Basic Mobility Inpatient Raw Score: 15  -[facility] Goal: 4: Move to chair/commode  -HLM Achieved: 2: Bed activities/Dependent transfer      Patient Centered Rounds: I performed bedside rounds with nursing staff today.   Discussions with Specialists or Other Care Team Provider: neurosurgery    Current Length of Stay: 2 day(s)  Current Patient Status: Inpatient   Certification Statement: The patient will continue to require additional inpatient hospital stay due to back pain      Code Status: Level 1 - Full Code    Subjective   Back pain    Objective :  Temp:  [97.2 °F (36.2 °C)-97.9 °F (36.6 °C)] 97.2 °F (36.2 °C)  HR:  [56-61] 61  BP: (126-147)/(64-88) 134/83  Resp:  [18] 18  SpO2:  [95 %-96 %] 95 %  O2 Device: None (Room air)    There is no height or weight on file to calculate BMI.     Input and Output Summary (last 24 hours):     Intake/Output Summary (Last 24 hours) at 2/1/2025 1010  Last data filed at 2/1/2025 0411  Gross per 24 hour   Intake 853.75 ml   Output 650 ml   Net 203.75 ml       Physical Exam  Vitals and nursing note reviewed.   Constitutional:       Appearance: He is well-developed. He is obese.   HENT:      Head: Normocephalic and atraumatic.   Neck:      Thyroid: No thyromegaly.      Vascular: No JVD.      Trachea: No tracheal deviation.   Cardiovascular:      Rate and Rhythm: Normal rate and regular rhythm.      Heart sounds: Normal heart sounds.   Pulmonary:      Effort: Pulmonary effort is normal. No respiratory distress.      Breath sounds: Normal breath sounds. No wheezing or rales.   Abdominal:      General: Bowel sounds are normal. There is no distension.       Palpations: Abdomen is soft.      Tenderness: There is no abdominal tenderness. There is no guarding.   Musculoskeletal:      Cervical back: Neck supple.      Right lower leg: No edema.      Comments: Left foot dressing intact.  No edema to legs.  Patient able to move both legs.  Lumbar spine nontender.   Skin:     General: Skin is warm and dry.   Neurological:      General: No focal deficit present.      Mental Status: He is alert and oriented to person, place, and time.   Psychiatric:         Mood and Affect: Mood normal.         Judgment: Judgment normal.         Lab Results: I have reviewed the following results:   Results from last 7 days   Lab Units 02/01/25  0542   WBC Thousand/uL 10.06   HEMOGLOBIN g/dL 14.4   HEMATOCRIT % 43.7   PLATELETS Thousands/uL 171   SEGS PCT % 66   LYMPHO PCT % 21   MONO PCT % 9   EOS PCT % 3     Results from last 7 days   Lab Units 02/01/25  0542 01/31/25  0533 01/30/25  1743   SODIUM mmol/L 141   < > 141   POTASSIUM mmol/L 3.5   < > 3.8   CHLORIDE mmol/L 111*   < > 107   CO2 mmol/L 18*   < > 21   BUN mg/dL 29*   < > 29*   CREATININE mg/dL 1.60*   < > 1.76*   ANION GAP mmol/L 12   < > 13   CALCIUM mg/dL 9.2   < > 10.3*   ALBUMIN g/dL  --   --  4.5   TOTAL BILIRUBIN mg/dL  --   --  0.49   ALK PHOS U/L  --   --  55   ALT U/L  --   --  24   AST U/L  --   --  23   GLUCOSE RANDOM mg/dL 125   < > 135    < > = values in this interval not displayed.         Results from last 7 days   Lab Units 02/01/25  0738 02/01/25  0249 01/31/25  2143 01/31/25  1557 01/31/25  1057 01/31/25  0720 01/31/25  0211 01/30/25  2033   POC GLUCOSE mg/dl 165* 133 84 157* 202* 221* 184* 199*               Recent Cultures (last 7 days):           Last 24 Hours Medication List:     Current Facility-Administered Medications:     acetaminophen (TYLENOL) tablet 975 mg, Q8H JANELL    allopurinol (ZYLOPRIM) tablet 100 mg, BID    aspirin (ECOTRIN LOW STRENGTH) EC tablet 81 mg, Daily    cyclobenzaprine (FLEXERIL) tablet  10 mg, TID PRN    Empagliflozin (JARDIANCE) tablet 10 mg, Daily With Dinner    enoxaparin (LOVENOX) subcutaneous injection 30 mg, Daily    eplerenone (INSPRA) tablet 25 mg, Daily    fish oil capsule 1,000 mg, BID    gabapentin (NEURONTIN) capsule 100 mg, HS    glipiZIDE (GLUCOTROL) tablet 5 mg, QAM    HYDROmorphone HCl (DILAUDID) injection 0.2 mg, Q4H PRN    insulin aspart protamine-insulin aspart (NovoLOG 70/30) 100 units/mL subcutaneous injection 50 Units, BID AC    insulin lispro (HumALOG/ADMELOG) 100 units/mL subcutaneous injection 1-5 Units, TID AC **AND** Fingerstick Glucose (POCT), TID AC    insulin lispro (HumALOG/ADMELOG) 100 units/mL subcutaneous injection 1-5 Units, HS    insulin lispro (HumALOG/ADMELOG) 100 units/mL subcutaneous injection 1-5 Units, 0200    losartan (COZAAR) tablet 50 mg, Daily    metoprolol succinate (TOPROL-XL) 24 hr tablet 150 mg, HS    ondansetron (ZOFRAN) injection 4 mg, Q4H PRN    oxyCODONE (ROXICODONE) IR tablet 5 mg, Q6H PRN    oxyCODONE (ROXICODONE) split tablet 2.5 mg, Q6H PRN    pravastatin (PRAVACHOL) tablet 40 mg, Daily With Dinner    saccharomyces boulardii (FLORASTOR) capsule 250 mg, BID    [Held by provider] torsemide (DEMADEX) tablet 20 mg, Daily **AND** [Held by provider] torsemide (DEMADEX) tablet 10 mg, After Dinner    Administrative Statements     **Please Note: This note may have been constructed using a voice recognition system.**

## 2025-02-01 NOTE — ASSESSMENT & PLAN NOTE
Pain control with ATC Tylenol, gabapentin, oxycodone and tolerated as needed.  Added Flexeril as needed for spasms  MRI of the lumbar spine: Multilevel degenerative discogenic disease, most notably at L4-L5 where there is a central and left central disc protrusion resulting in severe spinal stenosis and compression of the nerve roots of the cauda equina. Surgical consultation is recommended. Additional findings of degenerative discogenic disease at L5-S1 with endplate osteophytic ridging and facet arthropathy resulting in right greater than left foraminal stenosis and bilateral subarticular zone encroachment. Correlate for right greater than left L5 and bilateral S1 radiculitis.  Neurosurgery consultation appreciated  PT OT

## 2025-02-01 NOTE — CONSULTS
Podiatry - Consultation    Patient Information:   Stewart Flores 79 y.o. male MRN: [id number]  Unit/Bed#: 2 [place] [address] A Encounter: [id number]  PCP: Brady Reilly PA-C  Date of Admission:  1/30/2025  Date of Consultation: 01/31/25  Requesting Physician: Avila Alfaro MD      ASSESSMENT:    Stewart Flores is a 79 y.o. male with:    Status post left medial foot saucerization  Left foot Charcot neuroarthropathy    PLAN:    Incision site to the left foot remains well coapted with skin edges well-approximated.  No local signs of infection noted.  Local wound care consisting of Xeroform, dry sterile dressing, 4 inch Ace bandage performed.  Dressings can remain clean, dry, and intact until the patient's next postoperative visit  No additional podiatric care necessary at this time, patient may be weightbearing as tolerated to the left foot in his surgical shoe  Elevation and offloading on green foam wedges or pillows when non-ambulatory.  Rest of care per primary team.    Weightbearing status: Weightbearing as tolerated left  foot in surgical shoe    SUBJECTIVE:    History of Present Illness:    Stewart Flores is a 79 y.o. male who is originally admitted 1/30/2025 due to severe lower back pain. Patient has a past medical history of type 2 diabetes, Charcot neuroarthropathy to the left foot, tobacco use.    We are consulted for evaluation and management of the patient's left foot.  The patient recently underwent left foot exostectomy/saucerization to alleviate pressure to his left plantar foot secondary to Charcot neuroarthropathy.  This was performed on 1/21/2025.  He states that he started feeling extreme back pain yesterday and was unable to get out of bed.  For this reason he went to the emergency department for evaluation.    Review of Systems:    Constitutional: Negative.    HENT: Negative.    Eyes: Negative.    Respiratory: Negative.    Cardiovascular: Negative.    Gastrointestinal: Negative.     Musculoskeletal: Back pain  Skin: Healing surgical incision left foot  Neurological: Peripheral neuropathy  Psych: Negative.     Past Medical and Surgical History:     Past Medical History:   Diagnosis Date    Arthritis     Chronic kidney disease     Diabetes mellitus (HCC)     History of wound infection ?2013    RIGHT LOWER LEG. WAS + FOR STAPH INFECTION AT THAT TIME    Hypertension     Shoulder abrasion     right side after a fall in Feb 2018       Past Surgical History:   Procedure Laterality Date    BONE EXOSTOSIS EXCISION Left 1/21/2025    Procedure: EXCISION EXOSTOSIS/saucerization left foot;  Surgeon: Ollie White DPM;  Location: [facility];  Service: Podiatry    CHOLECYSTECTOMY      COLONOSCOPY      KNEE ARTHROPLASTY Left 2014    VT EXC B9 LESION MRGN XCP SK TG S/N/H/F/G 1.1-2.0CM Left 07/30/2018    Procedure: EXCISIONAL BIOPSY BENIGN NEOPLASM OF SKIN EXTREMITY;  Surgeon: Julio Cesar Trejo Jr., DPM;  Location: [facility];  Service: Podiatry    STEROID INJECTION SHOULDER Right     8 CERIVAL SPINE INJECTIONS    TONSILLECTOMY         Meds/Allergies:      Medications Prior to Admission:     allopurinol (ZYLOPRIM) 100 mg tablet    aspirin (ECOTRIN LOW STRENGTH) 81 mg EC tablet    dapagliflozin (Farxiga) 5 MG TABS    eplerenone (INSPRA) 50 MG tablet    glipiZIDE (GLUCOTROL) 5 mg tablet    insulin lispro protamine-insulin lispro (HumaLOG 75/25) 100 units/mL    losartan (COZAAR) 50 mg tablet    metoprolol succinate (TOPROL-XL) 100 mg 24 hr tablet    omega-3-acid ethyl esters (LOVAZA) 1 g capsule    rosuvastatin (CRESTOR) 5 mg tablet    torsemide (DEMADEX) 20 mg tablet    Continuous Glucose Sensor (FreeStyle [place] 2 Sensor) MISC    Allergies   Allergen Reactions    Ibuprofen Other (See Comments)     To avoid due to kidney problems       Social History:     Marital Status: /Civil Union    Substance Use History:   Social History     Substance and Sexual Activity   Alcohol Use Yes    Alcohol/week: 5.0 standard  drinks of alcohol    Types: 5 Cans of beer per week    Comment: DAY,SOMETIMES MORE PER PATIENT     Social History     Tobacco Use   Smoking Status Every Day    Types: Cigars   Smokeless Tobacco Never   Tobacco Comments    1 cigar a day.     Social History     Substance and Sexual Activity   Drug Use No       Family History:    History reviewed. No pertinent family history.      OBJECTIVE:    Vitals:   Blood Pressure: 126/88 (01/31/25 2000)  Pulse: 57 (01/31/25 2000)  Temperature: 97.5 °F (36.4 °C) (01/31/25 2000)  Temp Source: Temporal (01/31/25 2000)  Respirations: 18 (01/31/25 2000)  SpO2: 95 % (01/31/25 2000)    Physical Exam:    General Appearance: Alert, cooperative, no distress.  HEENT: Head normocephalic, atraumatic, without obvious abnormality.  Heart: Normal rate and rhythm.  Lungs: Non-labored breathing. No respiratory distress.  Abdomen: Without distension.  Psychiatric: AAOx3  Lower Extremity:    Vascular:   DP: Right: 2+ Left: 2+  PT: Right: 2+ Left: 2+  CRT < 3 seconds at the digits. +0/4 edema noted at bilateral lower extremities.   Pedal hair is absent.   Skin temperature is WNL bilaterally.    Musculoskeletal:  MMT is 5/5 in all muscle compartments bilaterally.   ROM at the 1st MPJ and ankle joint are decreased bilaterally with the leg extended.   No Pain on palpation of the bilateral foot.   Charcot neuroarthropathy of the left foot noted with rocker-bottom deformity most pronounced to the plantar medial aspect    Dermatological:  Left foot incision site is well coapted with skin edges well-approximated.  All sutures in place and intact.  No surrounding local signs of infection.  No drainage noted.    Neurological:  Gross sensation is diminished.   Light touch is diminished.   Protective sensation is diminished.      Additional data:     Lab Results: I have personally reviewed pertinent labs including:    Results from last 7 days   Lab Units 01/31/25  0637 01/30/25  1743   WBC Thousand/uL 13.33*  "12.52*   HEMOGLOBIN g/dL 15.4 15.4   HEMATOCRIT % 46.3 46.3   PLATELETS Thousands/uL 219 217   SEGS PCT %  --  73   LYMPHO PCT %  --  16   MONO PCT %  --  8   EOS PCT %  --  2     Results from last 7 days   Lab Units 01/31/25  0533 01/30/25  1743   POTASSIUM mmol/L 3.9 3.8   CHLORIDE mmol/L 107 107   CO2 mmol/L 19* 21   BUN mg/dL 30* 29*   CREATININE mg/dL 1.82* 1.76*   CALCIUM mg/dL 9.8 10.3*   ALK PHOS U/L  --  55   ALT U/L  --  24   AST U/L  --  23           Cultures: I have personally reviewed pertinent cultures including:              Imaging: I have personally reviewed pertinent reports in PACS.  EKG, Pathology, and Other Studies: I have personally reviewed pertinent reports.    Time Spent for Care: 30 minutes.  More than 50% of total time spent on counseling and coordination of care as described above.      ** Please Note: Portions of the record may have been created with voice recognition software. Occasional wrong word or \"sound a like\" substitutions may have occurred due to the inherent limitations of voice recognition software. Read the chart carefully and recognize, using context, where substitutions have occurred. **   "

## 2025-02-01 NOTE — UTILIZATION REVIEW
Continued Stay Review    Date: 2-1-25  Day 3: Has surpassed a 2nd midnight with active treatments and services for persistent severe back pain.                 Current Patient Class: Inpatient Current Level of Care: med surg    HPI:79 y.o. male initially admitted on 1-30-25    Acute right sided sciatica pain.    S/p L foot surgery 1/21/2025     Assessment/Plan:     Back pain 8/10.  Treated with po oxycodone and scheduled tylenol.  Flexeril added prn for back spasms.  Neuro surgical consult completed: plan conservative measures due to uncontrolled diabetes. Podiatry consult completed:  left foot wound care continues.  Weight bearing as tolerated with left foot in surgical shoe.  Creatinine 1.4-1.9. Today  creatinine 1.60.  Diuretics on hold and  iv fluids completed at 1056.      Scheduled Medications:    acetaminophen, 975 mg, Oral, Q8H JANELL  allopurinol, 100 mg, Oral, BID  aspirin, 81 mg, Oral, Daily  Empagliflozin, 10 mg, Oral, Daily With Dinner  enoxaparin, 30 mg, Subcutaneous, Daily  eplerenone, 25 mg, Oral, Daily  fish oil, 1,000 mg, Oral, BID  gabapentin, 100 mg, Oral, HS  glipiZIDE, 5 mg, Oral, QAM  insulin aspart protamine-insulin aspart, 50 Units, Subcutaneous, BID AC  insulin lispro, 1-5 Units, Subcutaneous, TID AC  insulin lispro, 1-5 Units, Subcutaneous, HS  insulin lispro, 1-5 Units, Subcutaneous, 0200  losartan, 50 mg, Oral, Daily  metoprolol succinate, 150 mg, Oral, HS  pravastatin, 40 mg, Oral, Daily With Dinner  saccharomyces boulardii, 250 mg, Oral, BID  [Held by provider] torsemide, 20 mg, Oral, Daily   And  [Held by provider] torsemide, 10 mg, Oral, After Dinner      Continuous IV Infusions:     PRN Meds:  cyclobenzaprine, 10 mg, Oral, TID PRN  HYDROmorphone, 0.2 mg, Intravenous, Q4H PRN  ondansetron, 4 mg, Intravenous, Q4H PRN  oxyCODONE, 5 mg, Oral, Q6H PRN  oxyCODONE, 2.5 mg, Oral, Q6H PRN      Discharge Plan: to be determination     Vital Signs (last 3 days)       Date/Time Temp Pulse Resp BP  MAP (mmHg) SpO2 O2 Device Pain    02/01/25 07:37:46 97.2 °F (36.2 °C) 61 18 134/83 100 95 % -- No Pain    02/01/25 0536 -- -- -- -- -- -- -- 8    02/01/25 0533 -- -- -- -- -- -- -- 8    01/31/25 2320 -- -- -- -- -- -- -- 10 - Worst Possible Pain    01/31/25 2300 97.9 °F (36.6 °C) 57 -- 147/68 98 96 % None (Room air) --    01/31/25 2147 -- 56 -- -- -- -- -- 3    01/31/25 21:46:43 -- -- -- 130/85 100 -- -- --    01/31/25 20:00:50 97.5 °F (36.4 °C) 57 18 126/88 101 95 % None (Room air) No Pain    01/31/25 15:45:22 -- -- -- 132/64 87 -- -- --    01/31/25 15:45:12 -- -- -- 132/64 87 -- -- --    01/31/25 1420 -- -- -- -- -- -- -- Med Not Given for Pain - for MAR use only    01/31/25 1158 -- -- -- -- -- -- -- 9    01/31/25 08:33:45 97.4 °F (36.3 °C) 60 18 134/81 99 96 % None (Room air) No Pain    01/31/25 0528 -- -- -- -- -- -- -- 3    01/31/25 02:13:12 97.1 °F (36.2 °C) 56 18 134/58 83 95 % None (Room air) --    01/31/25 0213 -- -- -- -- -- -- -- 7 01/30/25 2120 -- -- -- -- -- -- -- 7 01/30/25 2119 -- -- -- -- -- -- -- 7 01/30/25 2035 -- -- -- -- -- -- -- No Pain    01/30/25 20:13:31 98 °F (36.7 °C) -- 18 136/72 93 -- -- --    01/30/25 1847 -- 58 18 152/68 -- 97 % None (Room air) --    01/30/25 1444 97.8 °F (36.6 °C) 64 19 212/86 -- 98 % None (Room air) 9          Weight (last 2 days)       None            Pertinent Labs/Diagnostic Results:   Radiology:  MRI lumbar spine wo contrast   Final  (01/31 1413)      Multilevel degenerative discogenic disease, most notably at L4-L5 where there is a central and left central disc protrusion resulting in severe spinal stenosis and compression of the nerve roots of the cauda equina. Surgical consultation is recommended.      Additional findings of degenerative discogenic disease at L5-S1 with endplate osteophytic ridging and facet arthropathy resulting in right greater than left foraminal stenosis and bilateral subarticular zone encroachment. Correlate for right greater than  left L5 and bilateral S1 radiculitis.            CT lumbar spine without contrast   Final  (01/30 1830)      Chronic disc and facet degenerative change resulting in nerve root encroachment in the lower lumbar spine.      L4-5 left paracentral to foraminal zone disc protrusion encroaching on the traversing left L5 nerve root. MRI may be helpful for further evaluation.         XR tibia fibula 2 views LEFT   Final (01/30 1609)      No acute osseous abnormality.         XR foot 3+ vw left   Final (01/30 1608)      No acute osseous abnormality.        Cardiology:  No orders to display     GI:  No orders to display           Results from last 7 days   Lab Units 02/01/25  0542 01/31/25  0637 01/30/25  1743   WBC Thousand/uL 10.06 13.33* 12.52*   HEMOGLOBIN g/dL 14.4 15.4 15.4   HEMATOCRIT % 43.7 46.3 46.3   PLATELETS Thousands/uL 171 219 217   TOTAL NEUT ABS Thousands/µL 6.69  --  9.19*         Results from last 7 days   Lab Units 02/01/25  0542 01/31/25  0533 01/30/25  1743   SODIUM mmol/L 141 140 141   POTASSIUM mmol/L 3.5 3.9 3.8   CHLORIDE mmol/L 111* 107 107   CO2 mmol/L 18* 19* 21   ANION GAP mmol/L 12 14* 13   BUN mg/dL 29* 30* 29*   CREATININE mg/dL 1.60* 1.82* 1.76*   EGFR ml/min/1.73sq m 40 34 36   CALCIUM mg/dL 9.2 9.8 10.3*   MAGNESIUM mg/dL  --  2.0  --      Results from last 7 days   Lab Units 01/30/25  1743   AST U/L 23   ALT U/L 24   ALK PHOS U/L 55   TOTAL PROTEIN g/dL 7.4   ALBUMIN g/dL 4.5   TOTAL BILIRUBIN mg/dL 0.49   BILIRUBIN DIRECT mg/dL 0.10     Results from last 7 days   Lab Units 02/01/25  1123 02/01/25  0738 02/01/25  0249 01/31/25  2143 01/31/25  1557 01/31/25  1057 01/31/25  0720 01/31/25  0211 01/30/25  2033   POC GLUCOSE mg/dl 115 165* 133 84 157* 202* 221* 184* 199*     Results from last 7 days   Lab Units 02/01/25  0542 01/31/25  0533 01/30/25  1743   GLUCOSE RANDOM mg/dL 125 175* 135       Results from last 7 days   Lab Units 01/31/25  1159   CLARITY UA  Clear   COLOR UA  Yellow   SPEC  GRAV UA  1.015   PH UA  5.0   GLUCOSE UA mg/dl 1000 (1%)*   KETONES UA mg/dl Negative   BLOOD UA  Negative   PROTEIN UA mg/dl Negative   NITRITE UA  Negative   BILIRUBIN UA  Negative   UROBILINOGEN UA (BE) mg/dl <2.0   LEUKOCYTES UA  Negative   WBC UA /hpf None Seen   RBC UA /hpf 0-1*   BACTERIA UA /hpf None Seen   EPITHELIAL CELLS WET PREP /hpf Occasional     Network Utilization Review Department  ATTENTION: Please call with any questions or concerns to [phone] and carefully listen to the prompts so that you are directed to the right person. All voicemails are confidential.   For Discharge needs, contact Care Management DC Support Team at [phone] opt. 2  Send all requests for admission clinical reviews, approved or denied determinations and any other requests to dedicated fax number below belonging to the [place] where the patient is receiving treatment. List of dedicated fax numbers for the Facilities:  FACILITY NAME UR FAX NUMBER   ADMISSION DENIALS (Administrative/Medical Necessity) [phone]   DISCHARGE SUPPORT TEAM (NETWORK) [phone]   PARENT CHILD HEALTH (Maternity/NICU/Pediatrics) [phone]   [facility] [phone]   [facility] [phone]   [facility] [phone]   [facility] [phone]   [facility] [phone]   [facility] [phone]   [facility] [phone]   [facility] [phone]   [facility] [phone]   [facility] [phone]   [facility] [phone]   [facility] [phone]

## 2025-02-01 NOTE — ASSESSMENT & PLAN NOTE
Lab Results   Component Value Date    EGFR 40 02/01/2025    EGFR 34 01/31/2025    EGFR 36 01/30/2025    CREATININE 1.60 (H) 02/01/2025    CREATININE 1.82 (H) 01/31/2025    CREATININE 1.76 (H) 01/30/2025   Baseline creatinine appears to be around 1.4 -1.9 in past year.  Hold diuretics for now  Continue IV fluids  Recheck labs in a.m.

## 2025-02-02 LAB
ANION GAP SERPL CALCULATED.3IONS-SCNC: 12 MMOL/L (ref 4–13)
BASOPHILS # BLD AUTO: 0.08 THOUSANDS/ΜL (ref 0–0.1)
BASOPHILS NFR BLD AUTO: 1 % (ref 0–1)
BUN SERPL-MCNC: 23 MG/DL (ref 5–25)
CALCIUM SERPL-MCNC: 9 MG/DL (ref 8.4–10.2)
CHLORIDE SERPL-SCNC: 111 MMOL/L (ref 96–108)
CO2 SERPL-SCNC: 19 MMOL/L (ref 21–32)
CREAT SERPL-MCNC: 1.51 MG/DL (ref 0.6–1.3)
EOSINOPHIL # BLD AUTO: 0.26 THOUSAND/ΜL (ref 0–0.61)
EOSINOPHIL NFR BLD AUTO: 3 % (ref 0–6)
ERYTHROCYTE [DISTWIDTH] IN BLOOD BY AUTOMATED COUNT: 13.5 % (ref 11.6–15.1)
GFR SERPL CREATININE-BSD FRML MDRD: 43 ML/MIN/1.73SQ M
GLUCOSE SERPL-MCNC: 100 MG/DL (ref 65–140)
GLUCOSE SERPL-MCNC: 103 MG/DL (ref 65–140)
GLUCOSE SERPL-MCNC: 113 MG/DL (ref 65–140)
GLUCOSE SERPL-MCNC: 125 MG/DL (ref 65–140)
GLUCOSE SERPL-MCNC: 133 MG/DL (ref 65–140)
GLUCOSE SERPL-MCNC: 80 MG/DL (ref 65–140)
HCT VFR BLD AUTO: 43.3 % (ref 36.5–49.3)
HGB BLD-MCNC: 14.3 G/DL (ref 12–17)
IMM GRANULOCYTES # BLD AUTO: 0.03 THOUSAND/UL (ref 0–0.2)
IMM GRANULOCYTES NFR BLD AUTO: 0 % (ref 0–2)
LYMPHOCYTES # BLD AUTO: 1.98 THOUSANDS/ΜL (ref 0.6–4.47)
LYMPHOCYTES NFR BLD AUTO: 21 % (ref 14–44)
MCH RBC QN AUTO: 31 PG (ref 26.8–34.3)
MCHC RBC AUTO-ENTMCNC: 33 G/DL (ref 31.4–37.4)
MCV RBC AUTO: 94 FL (ref 82–98)
MONOCYTES # BLD AUTO: 0.81 THOUSAND/ΜL (ref 0.17–1.22)
MONOCYTES NFR BLD AUTO: 9 % (ref 4–12)
NEUTROPHILS # BLD AUTO: 6.26 THOUSANDS/ΜL (ref 1.85–7.62)
NEUTS SEG NFR BLD AUTO: 66 % (ref 43–75)
NRBC BLD AUTO-RTO: 0 /100 WBCS
PLATELET # BLD AUTO: 185 THOUSANDS/UL (ref 149–390)
PMV BLD AUTO: 9.6 FL (ref 8.9–12.7)
POTASSIUM SERPL-SCNC: 3.4 MMOL/L (ref 3.5–5.3)
RBC # BLD AUTO: 4.61 MILLION/UL (ref 3.88–5.62)
SODIUM SERPL-SCNC: 142 MMOL/L (ref 135–147)
WBC # BLD AUTO: 9.42 THOUSAND/UL (ref 4.31–10.16)

## 2025-02-02 PROCEDURE — 99232 SBSQ HOSP IP/OBS MODERATE 35: CPT | Performed by: HOSPITALIST

## 2025-02-02 PROCEDURE — 80048 BASIC METABOLIC PNL TOTAL CA: CPT | Performed by: HOSPITALIST

## 2025-02-02 PROCEDURE — 85025 COMPLETE CBC W/AUTO DIFF WBC: CPT | Performed by: HOSPITALIST

## 2025-02-02 PROCEDURE — 82948 REAGENT STRIP/BLOOD GLUCOSE: CPT

## 2025-02-02 RX ORDER — INSULIN LISPRO 100 [IU]/ML
1-5 INJECTION, SOLUTION INTRAVENOUS; SUBCUTANEOUS
Status: CANCELLED | OUTPATIENT
Start: 2025-02-03

## 2025-02-02 RX ORDER — GABAPENTIN 100 MG/1
100 CAPSULE ORAL
Status: CANCELLED | OUTPATIENT
Start: 2025-02-02

## 2025-02-02 RX ORDER — INSULIN LISPRO 100 [IU]/ML
1-5 INJECTION, SOLUTION INTRAVENOUS; SUBCUTANEOUS
Status: CANCELLED | OUTPATIENT
Start: 2025-02-02

## 2025-02-02 RX ORDER — ONDANSETRON 2 MG/ML
4 INJECTION INTRAMUSCULAR; INTRAVENOUS EVERY 4 HOURS PRN
Status: CANCELLED | OUTPATIENT
Start: 2025-02-02

## 2025-02-02 RX ORDER — ACETAMINOPHEN 325 MG/1
975 TABLET ORAL EVERY 8 HOURS SCHEDULED
Status: CANCELLED | OUTPATIENT
Start: 2025-02-02

## 2025-02-02 RX ORDER — HYDROMORPHONE HCL IN WATER/PF 6 MG/30 ML
0.2 PATIENT CONTROLLED ANALGESIA SYRINGE INTRAVENOUS EVERY 4 HOURS PRN
Refills: 0 | Status: CANCELLED | OUTPATIENT
Start: 2025-02-02

## 2025-02-02 RX ORDER — OXYCODONE HYDROCHLORIDE 5 MG/1
5 TABLET ORAL EVERY 6 HOURS PRN
Refills: 0 | Status: CANCELLED | OUTPATIENT
Start: 2025-02-02

## 2025-02-02 RX ORDER — ASPIRIN 81 MG/1
81 TABLET ORAL DAILY
Status: CANCELLED | OUTPATIENT
Start: 2025-02-03

## 2025-02-02 RX ORDER — SACCHAROMYCES BOULARDII 250 MG
250 CAPSULE ORAL 2 TIMES DAILY
Status: CANCELLED | OUTPATIENT
Start: 2025-02-02

## 2025-02-02 RX ORDER — INSULIN ASPART 100 [IU]/ML
50 INJECTION, SUSPENSION SUBCUTANEOUS
Status: CANCELLED | OUTPATIENT
Start: 2025-02-02

## 2025-02-02 RX ORDER — CHLORAL HYDRATE 500 MG
1000 CAPSULE ORAL 2 TIMES DAILY
Status: CANCELLED | OUTPATIENT
Start: 2025-02-02

## 2025-02-02 RX ORDER — CYCLOBENZAPRINE HCL 10 MG
10 TABLET ORAL 3 TIMES DAILY PRN
Status: CANCELLED | OUTPATIENT
Start: 2025-02-02

## 2025-02-02 RX ORDER — GLIPIZIDE 5 MG/1
5 TABLET ORAL EVERY MORNING
Status: CANCELLED | OUTPATIENT
Start: 2025-02-03

## 2025-02-02 RX ORDER — PRAVASTATIN SODIUM 40 MG
40 TABLET ORAL
Status: CANCELLED | OUTPATIENT
Start: 2025-02-02

## 2025-02-02 RX ORDER — LOSARTAN POTASSIUM 50 MG/1
50 TABLET ORAL DAILY
Status: CANCELLED | OUTPATIENT
Start: 2025-02-03

## 2025-02-02 RX ORDER — ENOXAPARIN SODIUM 100 MG/ML
30 INJECTION SUBCUTANEOUS DAILY
Status: CANCELLED | OUTPATIENT
Start: 2025-02-03

## 2025-02-02 RX ORDER — ALLOPURINOL 100 MG/1
100 TABLET ORAL 2 TIMES DAILY
Status: CANCELLED | OUTPATIENT
Start: 2025-02-02

## 2025-02-02 RX ORDER — EPLERENONE 25 MG/1
25 TABLET, FILM COATED ORAL DAILY
Status: CANCELLED | OUTPATIENT
Start: 2025-02-03

## 2025-02-02 RX ADMIN — METOPROLOL SUCCINATE 150 MG: 100 TABLET, EXTENDED RELEASE ORAL at 21:42

## 2025-02-02 RX ADMIN — ENOXAPARIN SODIUM 30 MG: 30 INJECTION SUBCUTANEOUS at 08:54

## 2025-02-02 RX ADMIN — EMPAGLIFLOZIN 10 MG: 10 TABLET, FILM COATED ORAL at 17:09

## 2025-02-02 RX ADMIN — ASPIRIN 81 MG: 81 TABLET, COATED ORAL at 08:54

## 2025-02-02 RX ADMIN — ACETAMINOPHEN 975 MG: 325 TABLET ORAL at 05:41

## 2025-02-02 RX ADMIN — ACETAMINOPHEN 975 MG: 325 TABLET ORAL at 21:35

## 2025-02-02 RX ADMIN — EPLERENONE 25 MG: 25 TABLET, FILM COATED ORAL at 08:55

## 2025-02-02 RX ADMIN — INSULIN ASPART 50 UNITS: 100 INJECTION, SUSPENSION SUBCUTANEOUS at 08:52

## 2025-02-02 RX ADMIN — LOSARTAN POTASSIUM 50 MG: 50 TABLET, FILM COATED ORAL at 08:54

## 2025-02-02 RX ADMIN — GLIPIZIDE 5 MG: 5 TABLET ORAL at 08:54

## 2025-02-02 RX ADMIN — OXYCODONE HYDROCHLORIDE 5 MG: 5 TABLET ORAL at 21:36

## 2025-02-02 RX ADMIN — ACETAMINOPHEN 975 MG: 325 TABLET ORAL at 15:07

## 2025-02-02 RX ADMIN — OMEGA-3 FATTY ACIDS CAP 1000 MG 1000 MG: 1000 CAP at 17:09

## 2025-02-02 RX ADMIN — Medication 250 MG: at 17:09

## 2025-02-02 RX ADMIN — GABAPENTIN 100 MG: 100 CAPSULE ORAL at 21:36

## 2025-02-02 RX ADMIN — ALLOPURINOL 100 MG: 100 TABLET ORAL at 08:53

## 2025-02-02 RX ADMIN — PRAVASTATIN SODIUM 40 MG: 40 TABLET ORAL at 17:09

## 2025-02-02 RX ADMIN — ALLOPURINOL 100 MG: 100 TABLET ORAL at 17:09

## 2025-02-02 RX ADMIN — Medication 250 MG: at 08:53

## 2025-02-02 RX ADMIN — OMEGA-3 FATTY ACIDS CAP 1000 MG 1000 MG: 1000 CAP at 08:53

## 2025-02-02 NOTE — ASSESSMENT & PLAN NOTE
Lab Results   Component Value Date    EGFR 43 02/02/2025    EGFR 40 02/01/2025    EGFR 34 01/31/2025    CREATININE 1.51 (H) 02/02/2025    CREATININE 1.60 (H) 02/01/2025    CREATININE 1.82 (H) 01/31/2025   Baseline creatinine appears to be around 1.4 -1.9 in past year.  Hold diuretics for now  Continue IV fluids  Recheck labs in a.m.

## 2025-02-02 NOTE — PROGRESS NOTES
Progress Note - Hospitalist   Name: Stewart Flores 79 y.o. male I MRN: [id number]  Unit/Bed#: 2 [facility] 219 A  Date of Admission: 1/30/2025   Date of Service: 2/2/2025 I Hospital Day: 3    Assessment & Plan  Acute low back pain with right-sided sciatica  Pain control with ATC Tylenol, gabapentin, oxycodone and tolerated as needed.  Added Flexeril as needed for spasms  MRI of the lumbar spine: Multilevel degenerative discogenic disease, most notably at L4-L5 where there is a central and left central disc protrusion resulting in severe spinal stenosis and compression of the nerve roots of the cauda equina. Surgical consultation is recommended. Additional findings of degenerative discogenic disease at L5-S1 with endplate osteophytic ridging and facet arthropathy resulting in right greater than left foraminal stenosis and bilateral subarticular zone encroachment. Correlate for right greater than left L5 and bilateral S1 radiculitis.  Neurosurgery consultation appreciated.  Discussed with neurosurgery team today and they agreed on transfer since the patient is still complaining of a lot of pain.  Discussed with [facility] hospitalist at [facility] and the patient was accepted as transfer.  May take a couple days for transfer to happen due to the bed availability.  Patient would rather go to [facility] instead of [facility] OT  Leukocytosis  Resolved.  Likely reactive  Acute kidney injury superimposed on chronic kidney disease  (HCC)  Lab Results   Component Value Date    EGFR 43 02/02/2025    EGFR 40 02/01/2025    EGFR 34 01/31/2025    CREATININE 1.51 (H) 02/02/2025    CREATININE 1.60 (H) 02/01/2025    CREATININE 1.82 (H) 01/31/2025   Baseline creatinine appears to be around 1.4 -1.9 in past year.  Hold diuretics for now  Continue IV fluids  Recheck labs in a.m.  Smoking - cigars daily  Declined nicotine patch  Gout  Continue allopurinol  Diabetes mellitus, type 2 (HCC)  Lab Results   Component Value Date     HGBA1C 8.2 (H) 12/30/2024       Recent Labs     02/01/25  2122 02/02/25  0220 02/02/25  0716 02/02/25  1121   POCGLU 106 103 125 133       Blood Sugar Average: Last 72 hrs:  (P) 149.9677108671208569  On Farxiga, glipizide, Humalog 75/25 60-58 units twice daily at home.  Continue physical glipizide  Continue NovoLog 70/30 50 units SQ twice daily before meals  SSI ACHS  Diabetic diet  Recent A1c 8.2  HTN (hypertension)  BP acceptable  Monitor  Hyperlipidemia  Continue statin  Bilateral leg edema  No leg edema on exam  Status post left foot surgery  As above  Podiatry consultation appreciated  Continue surgical shoe when out of bed    VTE Pharmacologic Prophylaxis: VTE Score: 5 lovenox    Mobility:   Basic Mobility Inpatient Raw Score: 15  -[facility] Goal: 4: Move to chair/commode  -HL Achieved: 4: Move to chair/commode      Patient Centered Rounds: I performed bedside rounds with nursing staff today.   Discussions with Specialists or Other Care Team Provider: neurosurgery    Current Length of Stay: 3 day(s)  Current Patient Status: Inpatient   Certification Statement: The patient will continue to require additional inpatient hospital stay due to back pain      Code Status: Level 1 - Full Code    Subjective   Back pain    Objective :  Temp:  [97.7 °F (36.5 °C)] 97.7 °F (36.5 °C)  HR:  [58-67] 62  BP: (137-155)/(78-82) 155/81  Resp:  [20] 20  SpO2:  [94 %] 94 %  O2 Device: None (Room air)    There is no height or weight on file to calculate BMI.     Input and Output Summary (last 24 hours):     Intake/Output Summary (Last 24 hours) at 2/2/2025 1211  Last data filed at 2/2/2025 1100  Gross per 24 hour   Intake 520 ml   Output 1175 ml   Net -655 ml       Physical Exam  Vitals and nursing note reviewed.   Constitutional:       Appearance: He is well-developed. He is obese.   HENT:      Head: Normocephalic and atraumatic.   Neck:      Thyroid: No thyromegaly.      Vascular: No JVD.      Trachea: No tracheal deviation.    Cardiovascular:      Rate and Rhythm: Normal rate and regular rhythm.      Heart sounds: Normal heart sounds.   Pulmonary:      Effort: Pulmonary effort is normal. No respiratory distress.      Breath sounds: Normal breath sounds. No wheezing or rales.   Abdominal:      General: Bowel sounds are normal. There is no distension.      Palpations: Abdomen is soft.      Tenderness: There is no abdominal tenderness. There is no guarding.   Musculoskeletal:      Cervical back: Neck supple.      Right lower leg: No edema.      Comments: Left foot dressing intact.  No edema to legs.  Patient able to move both legs.  Lumbar spine nontender.   Skin:     General: Skin is warm and dry.   Neurological:      General: No focal deficit present.      Mental Status: He is alert and oriented to person, place, and time.   Psychiatric:         Mood and Affect: Mood normal.         Judgment: Judgment normal.         Lab Results: I have reviewed the following results:   Results from last 7 days   Lab Units 02/02/25  0546   WBC Thousand/uL 9.42   HEMOGLOBIN g/dL 14.3   HEMATOCRIT % 43.3   PLATELETS Thousands/uL 185   SEGS PCT % 66   LYMPHO PCT % 21   MONO PCT % 9   EOS PCT % 3     Results from last 7 days   Lab Units 02/02/25  0546 01/31/25  0533 01/30/25  1743   SODIUM mmol/L 142   < > 141   POTASSIUM mmol/L 3.4*   < > 3.8   CHLORIDE mmol/L 111*   < > 107   CO2 mmol/L 19*   < > 21   BUN mg/dL 23   < > 29*   CREATININE mg/dL 1.51*   < > 1.76*   ANION GAP mmol/L 12   < > 13   CALCIUM mg/dL 9.0   < > 10.3*   ALBUMIN g/dL  --   --  4.5   TOTAL BILIRUBIN mg/dL  --   --  0.49   ALK PHOS U/L  --   --  55   ALT U/L  --   --  24   AST U/L  --   --  23   GLUCOSE RANDOM mg/dL 113   < > 135    < > = values in this interval not displayed.         Results from last 7 days   Lab Units 02/02/25  1121 02/02/25  0716 02/02/25  0220 02/01/25  2122 02/01/25  1715 02/01/25  1123 02/01/25  0738 02/01/25  0249 01/31/25  2143 01/31/25  1557 01/31/25  1057  01/31/25  0720   POC GLUCOSE mg/dl 133 125 103 106 170* 115 165* 133 84 157* 202* 221*               Recent Cultures (last 7 days):           Last 24 Hours Medication List:     Current Facility-Administered Medications:     acetaminophen (TYLENOL) tablet 975 mg, Q8H JANELL    allopurinol (ZYLOPRIM) tablet 100 mg, BID    aspirin (ECOTRIN LOW STRENGTH) EC tablet 81 mg, Daily    cyclobenzaprine (FLEXERIL) tablet 10 mg, TID PRN    Empagliflozin (JARDIANCE) tablet 10 mg, Daily With Dinner    enoxaparin (LOVENOX) subcutaneous injection 30 mg, Daily    eplerenone (INSPRA) tablet 25 mg, Daily    fish oil capsule 1,000 mg, BID    gabapentin (NEURONTIN) capsule 100 mg, HS    glipiZIDE (GLUCOTROL) tablet 5 mg, QAM    HYDROmorphone HCl (DILAUDID) injection 0.2 mg, Q4H PRN    insulin aspart protamine-insulin aspart (NovoLOG 70/30) 100 units/mL subcutaneous injection 50 Units, BID AC    insulin lispro (HumALOG/ADMELOG) 100 units/mL subcutaneous injection 1-5 Units, TID AC **AND** Fingerstick Glucose (POCT), TID AC    insulin lispro (HumALOG/ADMELOG) 100 units/mL subcutaneous injection 1-5 Units, HS    insulin lispro (HumALOG/ADMELOG) 100 units/mL subcutaneous injection 1-5 Units, 0200    losartan (COZAAR) tablet 50 mg, Daily    metoprolol succinate (TOPROL-XL) 24 hr tablet 150 mg, HS    ondansetron (ZOFRAN) injection 4 mg, Q4H PRN    oxyCODONE (ROXICODONE) IR tablet 5 mg, Q6H PRN    oxyCODONE (ROXICODONE) split tablet 2.5 mg, Q6H PRN    pravastatin (PRAVACHOL) tablet 40 mg, Daily With Dinner    saccharomyces boulardii (FLORASTOR) capsule 250 mg, BID    [Held by provider] torsemide (DEMADEX) tablet 20 mg, Daily **AND** [Held by provider] torsemide (DEMADEX) tablet 10 mg, After Dinner    Administrative Statements     **Please Note: This note may have been constructed using a voice recognition system.**

## 2025-02-02 NOTE — ASSESSMENT & PLAN NOTE
Pain control with ATC Tylenol, gabapentin, oxycodone and tolerated as needed.  Added Flexeril as needed for spasms  MRI of the lumbar spine: Multilevel degenerative discogenic disease, most notably at L4-L5 where there is a central and left central disc protrusion resulting in severe spinal stenosis and compression of the nerve roots of the cauda equina. Surgical consultation is recommended. Additional findings of degenerative discogenic disease at L5-S1 with endplate osteophytic ridging and facet arthropathy resulting in right greater than left foraminal stenosis and bilateral subarticular zone encroachment. Correlate for right greater than left L5 and bilateral S1 radiculitis.  Neurosurgery consultation appreciated.  Discussed with neurosurgery team today and they agreed on transfer since the patient is still complaining of a lot of pain.  Discussed with [facility] hospitalist at [facility] and the patient was accepted as transfer.  May take a couple days for transfer to happen due to the bed availability.  Patient would rather go to [facility] instead of [facility] OT

## 2025-02-02 NOTE — ASSESSMENT & PLAN NOTE
Lab Results   Component Value Date    HGBA1C 8.2 (H) 12/30/2024       Recent Labs     02/01/25  2122 02/02/25  0220 02/02/25  0716 02/02/25  1121   POCGLU 106 103 125 133       Blood Sugar Average: Last 72 hrs:  (P) 149.8716575025602251  On Farxiga, glipizide, Humalog 75/25 60-58 units twice daily at home.  Continue physical glipizide  Continue NovoLog 70/30 50 units SQ twice daily before meals  SSI ACHS  Diabetic diet  Recent A1c 8.2

## 2025-02-02 NOTE — OCCUPATIONAL THERAPY NOTE
Occupational Therapy Cancel Note       02/02/25 0665   Note Type   Note type Cancelled Session   Additional Comments OT orders received. Chart reviewed. Per review, patient with orders to transfer to either [facility] or [facility] for higher level of care  under neurosurgery due to continued pain. Will hold OT eval   Licensure   [place] License Number  Jane Mckeon, OTR/L [id number]

## 2025-02-03 ENCOUNTER — TELEPHONE (OUTPATIENT)
Age: 80
End: 2025-02-03

## 2025-02-03 PROBLEM — D72.829 LEUKOCYTOSIS: Status: RESOLVED | Noted: 2025-01-30 | Resolved: 2025-02-03

## 2025-02-03 PROBLEM — N18.9 CKD (CHRONIC KIDNEY DISEASE): Status: ACTIVE | Noted: 2025-02-03

## 2025-02-03 LAB
ANION GAP SERPL CALCULATED.3IONS-SCNC: 13 MMOL/L (ref 4–13)
BASOPHILS # BLD AUTO: 0.05 THOUSANDS/ΜL (ref 0–0.1)
BASOPHILS NFR BLD AUTO: 1 % (ref 0–1)
BUN SERPL-MCNC: 21 MG/DL (ref 5–25)
CALCIUM SERPL-MCNC: 8.9 MG/DL (ref 8.4–10.2)
CHLORIDE SERPL-SCNC: 111 MMOL/L (ref 96–108)
CO2 SERPL-SCNC: 17 MMOL/L (ref 21–32)
CREAT SERPL-MCNC: 1.36 MG/DL (ref 0.6–1.3)
EOSINOPHIL # BLD AUTO: 0.22 THOUSAND/ΜL (ref 0–0.61)
EOSINOPHIL NFR BLD AUTO: 2 % (ref 0–6)
ERYTHROCYTE [DISTWIDTH] IN BLOOD BY AUTOMATED COUNT: 13.7 % (ref 11.6–15.1)
GFR SERPL CREATININE-BSD FRML MDRD: 49 ML/MIN/1.73SQ M
GLUCOSE SERPL-MCNC: 100 MG/DL (ref 65–140)
GLUCOSE SERPL-MCNC: 104 MG/DL (ref 65–140)
GLUCOSE SERPL-MCNC: 118 MG/DL (ref 65–140)
GLUCOSE SERPL-MCNC: 135 MG/DL (ref 65–140)
GLUCOSE SERPL-MCNC: 78 MG/DL (ref 65–140)
GLUCOSE SERPL-MCNC: 83 MG/DL (ref 65–140)
HCT VFR BLD AUTO: 45.1 % (ref 36.5–49.3)
HGB BLD-MCNC: 14.8 G/DL (ref 12–17)
IMM GRANULOCYTES # BLD AUTO: 0.02 THOUSAND/UL (ref 0–0.2)
IMM GRANULOCYTES NFR BLD AUTO: 0 % (ref 0–2)
LYMPHOCYTES # BLD AUTO: 2.26 THOUSANDS/ΜL (ref 0.6–4.47)
LYMPHOCYTES NFR BLD AUTO: 24 % (ref 14–44)
MCH RBC QN AUTO: 31.2 PG (ref 26.8–34.3)
MCHC RBC AUTO-ENTMCNC: 32.8 G/DL (ref 31.4–37.4)
MCV RBC AUTO: 95 FL (ref 82–98)
MONOCYTES # BLD AUTO: 0.8 THOUSAND/ΜL (ref 0.17–1.22)
MONOCYTES NFR BLD AUTO: 8 % (ref 4–12)
NEUTROPHILS # BLD AUTO: 6.16 THOUSANDS/ΜL (ref 1.85–7.62)
NEUTS SEG NFR BLD AUTO: 65 % (ref 43–75)
NRBC BLD AUTO-RTO: 0 /100 WBCS
PLATELET # BLD AUTO: 183 THOUSANDS/UL (ref 149–390)
PMV BLD AUTO: 10 FL (ref 8.9–12.7)
POTASSIUM SERPL-SCNC: 3.7 MMOL/L (ref 3.5–5.3)
RBC # BLD AUTO: 4.75 MILLION/UL (ref 3.88–5.62)
SODIUM SERPL-SCNC: 141 MMOL/L (ref 135–147)
WBC # BLD AUTO: 9.51 THOUSAND/UL (ref 4.31–10.16)

## 2025-02-03 PROCEDURE — 80048 BASIC METABOLIC PNL TOTAL CA: CPT | Performed by: HOSPITALIST

## 2025-02-03 PROCEDURE — 82948 REAGENT STRIP/BLOOD GLUCOSE: CPT

## 2025-02-03 PROCEDURE — 99232 SBSQ HOSP IP/OBS MODERATE 35: CPT | Performed by: INTERNAL MEDICINE

## 2025-02-03 PROCEDURE — 85025 COMPLETE CBC W/AUTO DIFF WBC: CPT | Performed by: HOSPITALIST

## 2025-02-03 RX ADMIN — ALLOPURINOL 100 MG: 100 TABLET ORAL at 17:47

## 2025-02-03 RX ADMIN — LOSARTAN POTASSIUM 50 MG: 50 TABLET, FILM COATED ORAL at 09:33

## 2025-02-03 RX ADMIN — ACETAMINOPHEN 975 MG: 325 TABLET ORAL at 14:39

## 2025-02-03 RX ADMIN — ALLOPURINOL 100 MG: 100 TABLET ORAL at 09:33

## 2025-02-03 RX ADMIN — TORSEMIDE 10 MG: 10 TABLET ORAL at 17:47

## 2025-02-03 RX ADMIN — HYDROMORPHONE HYDROCHLORIDE 0.2 MG: 0.2 INJECTION, SOLUTION INTRAMUSCULAR; INTRAVENOUS; SUBCUTANEOUS at 17:46

## 2025-02-03 RX ADMIN — OMEGA-3 FATTY ACIDS CAP 1000 MG 1000 MG: 1000 CAP at 17:47

## 2025-02-03 RX ADMIN — HYDROMORPHONE HYDROCHLORIDE 0.2 MG: 0.2 INJECTION, SOLUTION INTRAMUSCULAR; INTRAVENOUS; SUBCUTANEOUS at 05:28

## 2025-02-03 RX ADMIN — ACETAMINOPHEN 975 MG: 325 TABLET ORAL at 05:18

## 2025-02-03 RX ADMIN — ACETAMINOPHEN 975 MG: 325 TABLET ORAL at 22:45

## 2025-02-03 RX ADMIN — METOPROLOL SUCCINATE 150 MG: 100 TABLET, EXTENDED RELEASE ORAL at 22:45

## 2025-02-03 RX ADMIN — EPLERENONE 25 MG: 25 TABLET, FILM COATED ORAL at 09:36

## 2025-02-03 RX ADMIN — OXYCODONE HYDROCHLORIDE 5 MG: 5 TABLET ORAL at 16:46

## 2025-02-03 RX ADMIN — PRAVASTATIN SODIUM 40 MG: 40 TABLET ORAL at 16:47

## 2025-02-03 RX ADMIN — ASPIRIN 81 MG: 81 TABLET, COATED ORAL at 09:33

## 2025-02-03 RX ADMIN — ENOXAPARIN SODIUM 30 MG: 30 INJECTION SUBCUTANEOUS at 09:34

## 2025-02-03 RX ADMIN — GLIPIZIDE 5 MG: 5 TABLET ORAL at 09:33

## 2025-02-03 RX ADMIN — INSULIN ASPART 50 UNITS: 100 INJECTION, SUSPENSION SUBCUTANEOUS at 09:42

## 2025-02-03 RX ADMIN — EMPAGLIFLOZIN 10 MG: 10 TABLET, FILM COATED ORAL at 16:47

## 2025-02-03 RX ADMIN — OMEGA-3 FATTY ACIDS CAP 1000 MG 1000 MG: 1000 CAP at 09:33

## 2025-02-03 RX ADMIN — Medication 250 MG: at 09:33

## 2025-02-03 RX ADMIN — OXYCODONE HYDROCHLORIDE 5 MG: 5 TABLET ORAL at 03:32

## 2025-02-03 RX ADMIN — GABAPENTIN 100 MG: 100 CAPSULE ORAL at 22:45

## 2025-02-03 RX ADMIN — Medication 250 MG: at 17:47

## 2025-02-03 RX ADMIN — CYCLOBENZAPRINE 10 MG: 10 TABLET, FILM COATED ORAL at 17:47

## 2025-02-03 NOTE — TELEPHONE ENCOUNTER
Patient wife called in and stated her  is at the hospital and it not sure when he will get out She wanted to know if it is possible to Dr. White to see him in the hospital to have his sutures removed.  Please advise thank you  She asked if you can call her at [phone] her home number.

## 2025-02-03 NOTE — PROGRESS NOTES
Progress Note - Hospitalist   Name: Stewart Flores 79 y.o. male I MRN: [id number]  Unit/Bed#: 2 [facility] 219 A  Date of Admission: 1/30/2025   Date of Service: 2/3/2025 I Hospital Day: 4    Assessment & Plan  Acute low back pain with right-sided sciatica  Patient presented to the ED for acute onset of back pain x2 days. Radiation to the left leg. Difficulty ambulating.  No bowel or bladder dysfunction.    MRI of the lumbar spine: Multilevel degenerative discogenic disease, most notably at L4-L5 where there is a central and left central disc protrusion resulting in severe spinal stenosis and compression of the nerve roots of the cauda equina. Surgical consultation is recommended. Additional findings of degenerative discogenic disease at L5-S1 with endplate osteophytic ridging and facet arthropathy resulting in right greater than left foraminal stenosis and bilateral subarticular zone encroachment. Correlate for right greater than left L5 and bilateral S1 radiculitis.  Neurosurgery consultation appreciated.  Discussed with neurosurgery team today and they agreed on transfer since the patient is still complaining of a lot of pain.  Discussed with SL[facility] hospitalist at [facility] and the patient was accepted as transfer.  May take a couple days for transfer to happen due to the bed availability.  Patient would rather go to [facility] instead of [place]  Pain control with ATC Tylenol, gabapentin, oxycodone and tolerated as needed.  Added Flexeril as needed for spasms  PT/OT evaluation and treatment  Smoking - cigars daily  Declined nicotine patch  Gout  Continue allopurinol  Diabetes mellitus, type 2 (HCC)  Lab Results   Component Value Date    HGBA1C 8.2 (H) 12/30/2024       Recent Labs     02/02/25  2013 02/03/25  0214 02/03/25  0802 02/03/25  1106   POCGLU 100 100 118 135       Blood Sugar Average: Last 72 hrs:  (P) 135.5198219823310798  On Farxiga, glipizide, Humalog 75/25 60-58 units twice daily at  home.  Continue NovoLog 70/30 50 units SQ twice daily before meals  SSI ACHS  Diabetic diet  Recent A1c 8.2  HTN (hypertension)  BP acceptable  Continue losartan, metoprolol, torsemide  Monitor vitals per routine  Hyperlipidemia  Continue statin  Status post left foot surgery  Continue surgical shoe when out of bed  Podiatry consultation appreciated. Follow up outpatient  CKD (chronic kidney disease)  Lab Results   Component Value Date    EGFR 49 02/03/2025    EGFR 43 02/02/2025    EGFR 40 02/01/2025    CREATININE 1.36 (H) 02/03/2025    CREATININE 1.51 (H) 02/02/2025    CREATININE 1.60 (H) 02/01/2025     Creatinine stable (baseline 1.4-1.8)  Avoid hypotension and nephrotoxic agents  Monitor vitals per routine    VTE Pharmacologic Prophylaxis: VTE Score: 5 High Risk (Score >/= 5) - Pharmacological DVT Prophylaxis Ordered: enoxaparin (Lovenox). Sequential Compression Devices Ordered.    Mobility:   Basic Mobility Inpatient Raw Score: 14  JH-HLM Goal: 4: Move to chair/commode  JH-HLM Achieved: 3: Sit at edge of bed  JH-HLM Goal NOT achieved. Continue with multidisciplinary rounding and encourage appropriate mobility to improve upon JH-HLM goals.    Patient Centered Rounds: I performed bedside rounds with nursing staff today.   Discussions with Specialists or Other Care Team Provider: Nursing      Current Length of Stay: 4 day(s)  Current Patient Status: Inpatient   Certification Statement: The patient will continue to require additional inpatient hospital stay due to back pain  Discharge Plan:  Transfer to [facility] once bed available    Code Status: Level 1 - Full Code    Subjective   Patient lying in bed.  Reports increased lower back pain today.  Pain meds administered.  Patient then was able to sit up in bed and eat breakfast.  No acute events overnight.    Objective :  Temp:  [97.6 °F (36.4 °C)-97.8 °F (36.6 °C)] 97.8 °F (36.6 °C)  HR:  [55-61] 55  BP: (156-167)/(75-82) 158/75  Resp:  [16-20] 16  SpO2:  [93 %-96 %] 95  %  O2 Device: None (Room air)    Body mass index is 35.28 kg/m².     Input and Output Summary (last 24 hours):     Intake/Output Summary (Last 24 hours) at 2/3/2025 1324  Last data filed at 2/3/2025 1101  Gross per 24 hour   Intake 150 ml   Output 675 ml   Net -525 ml       Physical Exam  Vitals and nursing note reviewed.   Constitutional:       General: He is not in acute distress.     Appearance: He is obese.   HENT:      Head: Normocephalic and atraumatic.   Eyes:      Conjunctiva/sclera: Conjunctivae normal.   Cardiovascular:      Rate and Rhythm: Normal rate.      Heart sounds: No murmur heard.  Pulmonary:      Effort: Pulmonary effort is normal. No respiratory distress.   Abdominal:      General: Bowel sounds are normal.      Palpations: Abdomen is soft.      Tenderness: There is no abdominal tenderness.   Musculoskeletal:         General: No swelling or deformity.      Cervical back: Neck supple.   Skin:     General: Skin is warm and dry.   Neurological:      General: No focal deficit present.      Mental Status: He is alert.           Lines/Drains:              Lab Results: I have reviewed the following results:   Results from last 7 days   Lab Units 02/03/25  0525   WBC Thousand/uL 9.51   HEMOGLOBIN g/dL 14.8   HEMATOCRIT % 45.1   PLATELETS Thousands/uL 183   SEGS PCT % 65   LYMPHO PCT % 24   MONO PCT % 8   EOS PCT % 2     Results from last 7 days   Lab Units 02/03/25  0525 01/31/25  0533 01/30/25  1743   SODIUM mmol/L 141   < > 141   POTASSIUM mmol/L 3.7   < > 3.8   CHLORIDE mmol/L 111*   < > 107   CO2 mmol/L 17*   < > 21   BUN mg/dL 21   < > 29*   CREATININE mg/dL 1.36*   < > 1.76*   ANION GAP mmol/L 13   < > 13   CALCIUM mg/dL 8.9   < > 10.3*   ALBUMIN g/dL  --   --  4.5   TOTAL BILIRUBIN mg/dL  --   --  0.49   ALK PHOS U/L  --   --  55   ALT U/L  --   --  24   AST U/L  --   --  23   GLUCOSE RANDOM mg/dL 104   < > 135    < > = values in this interval not displayed.         Results from last 7 days   Lab  Units 02/03/25  1106 02/03/25  0802 02/03/25  0214 02/02/25  2013 02/02/25  1633 02/02/25  1121 02/02/25  0716 02/02/25  0220 02/01/25  2122 02/01/25  1715 02/01/25  1123 02/01/25  0738   POC GLUCOSE mg/dl 135 118 100 100 80 133 125 103 106 170* 115 165*               Recent Cultures (last 7 days):         Imaging Results Review: I reviewed radiology reports from this admission including: MRI spine.  Other Study Results Review: No additional pertinent studies reviewed.    Last 24 Hours Medication List:     Current Facility-Administered Medications:     acetaminophen (TYLENOL) tablet 975 mg, Q8H JANELL    allopurinol (ZYLOPRIM) tablet 100 mg, BID    aspirin (ECOTRIN LOW STRENGTH) EC tablet 81 mg, Daily    cyclobenzaprine (FLEXERIL) tablet 10 mg, TID PRN    Empagliflozin (JARDIANCE) tablet 10 mg, Daily With Dinner    enoxaparin (LOVENOX) subcutaneous injection 30 mg, Daily    eplerenone (INSPRA) tablet 25 mg, Daily    fish oil capsule 1,000 mg, BID    gabapentin (NEURONTIN) capsule 100 mg, HS    glipiZIDE (GLUCOTROL) tablet 5 mg, QAM    HYDROmorphone HCl (DILAUDID) injection 0.2 mg, Q4H PRN    insulin aspart protamine-insulin aspart (NovoLOG 70/30) 100 units/mL subcutaneous injection 50 Units, BID AC    insulin lispro (HumALOG/ADMELOG) 100 units/mL subcutaneous injection 1-5 Units, TID AC **AND** Fingerstick Glucose (POCT), TID AC    insulin lispro (HumALOG/ADMELOG) 100 units/mL subcutaneous injection 1-5 Units, HS    losartan (COZAAR) tablet 50 mg, Daily    metoprolol succinate (TOPROL-XL) 24 hr tablet 150 mg, HS    ondansetron (ZOFRAN) injection 4 mg, Q4H PRN    oxyCODONE (ROXICODONE) IR tablet 5 mg, Q6H PRN    oxyCODONE (ROXICODONE) split tablet 2.5 mg, Q6H PRN    pravastatin (PRAVACHOL) tablet 40 mg, Daily With Dinner    saccharomyces boulardii (FLORASTOR) capsule 250 mg, BID    torsemide (DEMADEX) tablet 20 mg, Daily **AND** torsemide (DEMADEX) tablet 10 mg, After Dinner    Administrative Statements   Today, Patient  Was Seen By: Tessy Marquez PA-C  I have spent a total time of 30 minutes in caring for this patient on the day of the visit/encounter including Diagnostic results, Risks and benefits of tx options, Instructions for management, Patient and family education, Counseling / Coordination of care, Documenting in the medical record, Reviewing / ordering tests, medicine, procedures  , Obtaining or reviewing history  , and Communicating with other healthcare professionals .    **Please Note: This note may have been constructed using a voice recognition system.**

## 2025-02-03 NOTE — PLAN OF CARE
Problem: PAIN - ADULT  Goal: Verbalizes/displays adequate comfort level or baseline comfort level  Description: Interventions:  - Encourage patient to monitor pain and request assistance  - Assess pain using appropriate pain scale  - Administer analgesics based on type and severity of pain and evaluate response  - Implement non-pharmacological measures as appropriate and evaluate response  - Consider cultural and social influences on pain and pain management  - Notify physician/advanced practitioner if interventions unsuccessful or patient reports new pain  Outcome: Progressing     Problem: SAFETY ADULT  Goal: Patient will remain free of falls  Description: INTERVENTIONS:  - Educate patient/family on patient safety including physical limitations  - Instruct patient to call for assistance with activity   - Consult OT/PT to assist with strengthening/mobility   - Keep Call bell within reach  - Keep bed low and locked with side rails adjusted as appropriate  - Keep care items and personal belongings within reach  - Initiate and maintain comfort rounds  - Make Fall Risk Sign visible to staff  - Offer Toileting every 2 Hours, in advance of need  - Initiate/Maintain bed alarm  - Obtain necessary fall risk management equipment: alarm  - Apply yellow socks and bracelet for high fall risk patients  - Consider moving patient to room near nurses station  Outcome: Progressing  Goal: Maintain or return to baseline ADL function  Description: INTERVENTIONS:  -  Assess patient's ability to carry out ADLs; assess patient's baseline for ADL function and identify physical deficits which impact ability to perform ADLs (bathing, care of mouth/teeth, toileting, grooming, dressing, etc.)  - Assess/evaluate cause of self-care deficits   - Assess range of motion  - Assess patient's mobility; develop plan if impaired  - Assess patient's need for assistive devices and provide as appropriate  - Encourage maximum independence but intervene and  supervise when necessary  - Involve family in performance of ADLs  - Assess for home care needs following discharge   - Consider OT consult to assist with ADL evaluation and planning for discharge  - Provide patient education as appropriate  Outcome: Progressing     Problem: DISCHARGE PLANNING  Goal: Discharge to home or other facility with appropriate resources  Description: INTERVENTIONS:  - Identify barriers to discharge w/patient and caregiver  - Arrange for needed discharge resources and transportation as appropriate  - Identify discharge learning needs (meds, wound care, etc.)  - Arrange for interpretive services to assist at discharge as needed  - Refer to Case Management Department for coordinating discharge planning if the patient needs post-hospital services based on physician/advanced practitioner order or complex needs related to functional status, cognitive ability, or social support system  Outcome: Progressing

## 2025-02-03 NOTE — CASE MANAGEMENT
Case Management Assessment & Discharge Planning Note    Patient name Stewart Flores  Location [facility] A MRN [id number]  : 1945 Date 2/3/2025       Current Admission Date: 2025  Current Admission Diagnosis:Acute low back pain with right-sided sciatica   Patient Active Problem List    Diagnosis Date Noted Date Diagnosed    Status post left foot surgery 2025     Acute low back pain with right-sided sciatica 2025     Leukocytosis 2025     Bilateral leg edema 2025     S/P total knee arthroplasty, left 2025     Smoking - cigars daily 2025     Gout 2025     Diabetes mellitus, type 2 (HCC) 2025     HTN (hypertension) 2025     Hyperlipidemia 2025     Acute kidney injury superimposed on chronic kidney disease  (HCC) 2025       LOS (days): 4  Geometric Mean LOS (GMLOS) (days): 2.9  Days to GMLOS:-0.7     OBJECTIVE:    Risk of Unplanned Readmission Score: 12.12         Current admission status: Inpatient     Preferred Pharmacy:   [facility] [address] [address]  Phone: [phone] Fax: [phone]    [facility] - [address] [facility] - [facility]  [place] [address]  Phone: [phone] Fax: [phone]    Primary Care Provider: Brady Reilly PA-C    Primary Insurance: [facility]  Secondary Insurance:     ASSESSMENT:  Active Health Care Proxies    There are no active Health Care Proxies on file.       Readmission Root Cause  30 Day Readmission: No    Patient Information  Admitted from:: Home  Mental Status: Alert  Assessment information provided by:: Patient  Primary Caregiver: Family  Caregiver's Name:: Karolyn Flores  Caregiver's Relationship to Patient:: Family Member  Caregiver's Telephone Number:: [phone]  Support Systems: Self, Spouse/significant other  [facility]  Residence: [place]  What city do you live in?: [place]  Home entry access options. Select all that apply.: Stairs  Number of steps to enter home.: 6  Type of Current Residence: [facility]  Living Arrangements: Lives w/ Spouse/significant other    Activities of Daily Living Prior to Admission  Functional Status: Independent  Completes ADLs independently?: Yes  Ambulates independently?: Yes  Does patient use assisted devices?: Yes  Assisted Devices (DME) used: Walker, Straight Cane  Does patient currently own DME?: Yes  What DME does the patient currently own?: Straight Cane, Walker  Does the patient have a history of Short-Term Rehab?: No  Does patient have a history of HHC?: Yes  Does patient currently have HHC?: No     Patient Information Continued  Income Source: Pension/long term  Does patient have prescription coverage?: Yes  Does patient receive dialysis treatments?: No     Means of Transportation  Means of Transport to Appts:: Family transport    DISCHARGE DETAILS:    Discharge planning discussed with:: Patient  Freedom of Choice: Yes     Contacts  Patient Contacts: Karolyn Flores (wife)  Relationship to Patient:: Family    Other Referral/Resources/Interventions Provided:  Referral Comments: RN TRIXIE spoke with patient at bedside to introduce role, and screen for anticipated discharge needsrequiring CM assistance,. Patient stated he lives with his wife and has been independent with adls and iadls and does have a cane and walker and will use as needed. Discussed level III rehab per therapy recs. Patient stated he did have home rehab in the past but does not remember the name of the agency. Patient then stated he is being transfered to [facility]. Patient made aware CM at the [facility] will follow up with him for discharge needs.     allergies, food allergies and immunocompromised state. Neurological:  Positive for weakness. Negative for dizziness, tremors, seizures, syncope, facial asymmetry, speech difficulty, light-headedness, numbness and headaches. Hematological:  Negative for adenopathy. Bruises/bleeds easily. Psychiatric/Behavioral:  Positive for decreased concentration and dysphoric mood. Negative for agitation, behavioral problems, confusion, hallucinations, self-injury, sleep disturbance and suicidal ideas. The patient is nervous/anxious. The patient is not hyperactive. Past Medical/Surgical Hx;        Diagnosis Date    (HFpEF) heart failure with preserved ejection fraction ([address])     1/16/2018- echo- LVEF 55-65%    Anal fissure     Anemia 10/6/2017    Anxiety and depression     Arthritis     Asthma     Atrial fibrillation (HCC)     Eliquis    Blood transfusion     long time no reaction    CHF (congestive heart failure) (HCC)     Diastolic    COPD (chronic obstructive pulmonary disease) (HCC)     Disc disorder     DM2 (diabetes mellitus, type 2) ([address])     on insulin    Fall due to stumbling 10/6/2017    GERD (gastroesophageal reflux disease)     History of blood transfusion     Hx of blood clots     Hyperlipidemia     Hypertension     Inappropriate sinus tachycardia     LVH (left ventricular hypertrophy)     moderate    Lymphadenitis, 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 Vera Brown, [facility]    ANOSCOPY  6/13/2007    injection of anal sphincter with Botox, Franklyn Arndt [facility]    ANUS SURGERY  4/4/2006    Lateral sphincterotomy for chronic anal fissure, Dr. Marv Castillo, [address] [place] [address], [id number]  4/18/2012    anal exam and injection of

## 2025-02-03 NOTE — ASSESSMENT & PLAN NOTE
Patient presented to the ED for acute onset of back pain x2 days. Radiation to the left leg. Difficulty ambulating.  No bowel or bladder dysfunction.    MRI of the lumbar spine: Multilevel degenerative discogenic disease, most notably at L4-L5 where there is a central and left central disc protrusion resulting in severe spinal stenosis and compression of the nerve roots of the cauda equina. Surgical consultation is recommended. Additional findings of degenerative discogenic disease at L5-S1 with endplate osteophytic ridging and facet arthropathy resulting in right greater than left foraminal stenosis and bilateral subarticular zone encroachment. Correlate for right greater than left L5 and bilateral S1 radiculitis.  Neurosurgery consultation appreciated.  Discussed with neurosurgery team today and they agreed on transfer since the patient is still complaining of a lot of pain.  Discussed with SLIM hospitalist at [facility] and the patient was accepted as transfer.  May take a couple days for transfer to happen due to the bed availability.  Patient would rather go to [facility] instead of [place]  Pain control with ATC Tylenol, gabapentin, oxycodone and tolerated as needed.  Added Flexeril as needed for spasms  PT/OT evaluation and treatment

## 2025-02-03 NOTE — ASSESSMENT & PLAN NOTE
Lab Results   Component Value Date    EGFR 49 02/03/2025    EGFR 43 02/02/2025    EGFR 40 02/01/2025    CREATININE 1.36 (H) 02/03/2025    CREATININE 1.51 (H) 02/02/2025    CREATININE 1.60 (H) 02/01/2025     Creatinine stable (baseline 1.4-1.8)  Avoid hypotension and nephrotoxic agents  Monitor vitals per routine

## 2025-02-03 NOTE — TELEPHONE ENCOUNTER
SW patients wife. Advised her that he can have them removed when out of hospital. If still in hospital next week Dr. White can remove them. Pt wife understood.

## 2025-02-03 NOTE — UTILIZATION REVIEW
Initial Clinical Review    Admission: Date/Time/Statement:   Admission Orders (From admission, onward)       Ordered        01/30/25 1841  INPATIENT ADMISSION  Once                          Orders Placed This Encounter   Procedures    INPATIENT ADMISSION     Standing Status:   Standing     Number of Occurrences:   1     Level of Care:   Med Surg [16]     Estimated length of stay:   More than 2 Midnights     Certification:   I certify that inpatient services are medically necessary for this patient for a duration of greater than two midnights. See H&P and MD Progress Notes for additional information about the patient's course of treatment.     ED Arrival Information       Expected   -    Arrival   1/30/2025 14:40    Acuity   Urgent              Means of arrival   Ambulance    Escorted by   [facility]    Service   Hospitalist    Admission type   Emergency              Arrival complaint   back pain             Chief Complaint   Patient presents with    Leg Pain     Patient c/o left ankle pain radiating up to his lower back starting two days ago.  States had surgery on his left foot on 1/21.       Initial Presentation:  79 yom to ER from home via EMS for worsening low back pain rating into the left leg that has led to inability ambulate at home. Hx low back pain, received injections about 10 years ago, recent surgery on his left heel for diabetic ulcer , sciatica. Presents hypertensive, L paraspinal musculature tenderness. Admission L tib/fib xray neg. L foot xray neg. CT lumbar spine: Chronic disc and facet degenerative change resulting in nerve root encroachment in the lower lumbar spine. L4-5 left paracentral to foraminal zone disc protrusion encroaching on the traversing left L5 nerve root. Labs: WBC 12.52, BUN 29, Cr 1.76, Ca 10.3.  Admitted to inpatient status for acute low back pain with R sided sciatica. Plan: pain mgt, therapies, accuchecks, podiatry consulted      Anticipated Length of  Stay/Certification Statement:   Patient will be admitted on an inpatient basis with an anticipated length of stay of greater than 2 midnights secondary to right sciatic.     Date: 1/31/25   Day 2:   Continues to require IV Dilaudid for pain control. Using IV Ativan for anxiety. Therapies ongoing to determine safe d/c plan. Continue pain mgt, monitor functional ability for safety, monitor VS, follow labs.       ED Treatment-Medication Administration from 01/30/2025 1440 to 01/30/2025 2003         Date/Time Order Dose Route Action     01/30/2025 1508 oxyCODONE (ROXICODONE) IR tablet 5 mg 5 mg Oral Given     01/30/2025 1509 lidocaine (LIDODERM) 5 % patch 1 patch 1 patch Topical Medication Applied            Scheduled Medications:  Medications 01/22 01/23 01/24 01/25 01/26 01/27 01/28 01/29 01/30 01/31   acetaminophen (TYLENOL) tablet 975 mg  Dose: 975 mg  Freq: Every 8 hours scheduled Route: PO  Indications Comment: Around the clock pain.  Start: 01/30/25 2200            2120      0528     1400     2200        allopurinol (ZYLOPRIM) tablet 100 mg  Dose: 100 mg  Freq: 2 times daily Route: PO  Start: 01/31/25 0900             0835     1800        aspirin (ECOTRIN LOW STRENGTH) EC tablet 81 mg  Dose: 81 mg  Freq: Daily Route: PO  Start: 01/31/25 0900   Admin Instructions:                0835        ceFAZolin (ANCEF) IVPB (premix in dextrose) 2,000 mg 50 mL  Dose: 2,000 mg  Freq: Once Route: IV  Indications of Use: PROPHYLAXIS  Start: 01/21/25 0730 End: 01/21/25 0927   Admin Instructions:      Order specific questions:                   chlorhexidine (PERIDEX) 0.12 % oral rinse 15 mL  Dose: 15 mL  Freq: Once Route: SWISH & SPIT  Start: 01/21/25 0730 End: 01/21/25 0739   Admin Instructions:                   Empagliflozin (JARDIANCE) tablet 10 mg  Dose: 10 mg  Freq: Daily with dinner Route: PO  Start: 01/31/25 1630   Order specific questions:                1630        enoxaparin (LOVENOX) subcutaneous injection 30  mg  Dose: 30 mg  Freq: Daily Route: SC  Start: 01/31/25 0900   Admin Instructions:                0835        eplerenone (INSPRA) tablet 25 mg  Dose: 25 mg  Freq: Daily Route: PO  Start: 01/31/25 0900   Admin Instructions:      Order specific questions:                0837        eplerenone (INSPRA) tablet 50 mg  Dose: 50 mg  Freq: 2 times daily Route: PO  Start: 01/31/25 0900 End: 01/30/25 2036   Admin Instructions:      Order specific questions:               2036-D/C'd       fish oil capsule 1,000 mg  Dose: 1,000 mg  Freq: 2 times daily Route: PO  Start: 01/31/25 0900   Admin Instructions:                0836     1800        fish oil capsule 1,000 mg  Dose: 1,000 mg  Freq: 2 times daily Route: PO  Start: 01/30/25 2030 End: 01/30/25 2020   Admin Instructions:               2020-D/C'd       gabapentin (NEURONTIN) capsule 100 mg  Dose: 100 mg  Freq: Daily at bedtime Route: PO  Start: 01/30/25 2200   Admin Instructions:               2120 2200        glipiZIDE (GLUCOTROL) tablet 5 mg  Dose: 5 mg  Freq: Every morning Route: PO  Start: 01/31/25 0900   Admin Instructions:                0836        insulin aspart protamine-insulin aspart (NovoLOG 70/30) 100 units/mL subcutaneous injection 50 Units  Dose: 50 Units  Freq: 2 times daily before meals Route: SC  Start: 01/31/25 0700   Admin Instructions:                0841     1600        insulin lispro (HumALOG/ADMELOG) 100 units/mL subcutaneous injection 1-5 Units  Dose: 1-5 Units  Freq: Daily  (0200) Route: SC  Start: 01/31/25 0200   Admin Instructions:                0221        insulin lispro (HumALOG/ADMELOG) 100 units/mL subcutaneous injection 1-5 Units  Dose: 1-5 Units  Freq: Daily at bedtime Route: SC  Start: 01/30/25 2200   Admin Instructions:               2123 [C]      2200         insulin lispro (HumALOG/ADMELOG) 100 units/mL subcutaneous injection 1-5 Units  Dose: 1-5 Units  Freq: 3 times daily before meals Route: SC  Start: 01/31/25 0700   Admin  Instructions:                0841     1137     1600        lidocaine (LIDODERM) 5 % patch 1 patch  Dose: 1 patch  Freq: Once Route: TP  Start: 01/30/25 1515 End: 01/31/25 0309   Admin Instructions:      Order specific questions:               1509      0345 [C]        losartan (COZAAR) tablet 50 mg  Dose: 50 mg  Freq: Daily Route: PO  Start: 01/31/25 0900   Order specific questions:                0838        metoprolol succinate (TOPROL-XL) 24 hr tablet 150 mg  Dose: 150 mg  Freq: Daily at bedtime Route: PO  Start: 01/31/25 2200   Admin Instructions:      Order specific questions:                2200        ondansetron (ZOFRAN) injection 4 mg  Dose: 4 mg  Freq: Once Route: IV  Indications of Use: POSTOPERATIVE NAUSEA AND VOMITING  Start: 01/21/25 1030 End: 01/21/25 1142   Admin Instructions:                   oxyCODONE (ROXICODONE) IR tablet 5 mg  Dose: 5 mg  Freq: Once Route: PO  Start: 01/30/25 1515 End: 01/30/25 1508   Admin Instructions:               1508         pravastatin (PRAVACHOL) tablet 40 mg  Dose: 40 mg  Freq: Daily with dinner Route: PO  Start: 01/31/25 1630             1630        saccharomyces boulardii (FLORASTOR) capsule 250 mg  Dose: 250 mg  Freq: 2 times daily Route: PO  Start: 01/31/25 0900             0838     1800         torsemide (DEMADEX) tablet 20 mg  Dose: 20 mg  Freq: Daily Route: PO  Indications of Use: EDEMA  Start: 01/31/25 0900   Order specific questions:                0838     1144        And   torsemide (DEMADEX) tablet 10 mg  Dose: 10 mg  Freq: Daily after dinner Route: PO  Start: 01/31/25 1800   Order specific questions:                1144     1800                    Continuous Meds Sorted by Name  for Stewart Flores as of 01/22/25 through 1/31/25  Legend:       Medications 01/22 01/23 01/24 01/25 01/26 01/27 01/28 01/29 01/30 01/31   lactated ringers infusion  Rate: 75 mL/hr Dose: 75 mL/hr  Freq: Continuous Route: IV  Indications of Use: IV Hydration  Last Dose: 400  mL/hr (01/21/25 1012)  Start: 01/21/25 0730 End: 01/21/25 1343                sodium chloride 0.9 % infusion  Rate: 75 mL/hr Dose: 75 mL/hr  Freq: Continuous Route: IV  Last Dose: 75 mL/hr (01/31/25 1158)  Start: 01/31/25 1200 End: 02/01/25 1157             1158                    PRN Meds Sorted by Name  for Stewart Flores as of 01/22/25 through 1/31/25  Legend:       Medications 01/22 01/23 01/24 01/25 01/26 01/27 01/28 01/29 01/30 01/31   bupivacaine (PF) (MARCAINE) 0.25 % 1 mL, lidocaine (PF) (XYLOCAINE-MPF) 1 % 1 mL infiltration  Freq: As needed  Start: 01/21/25 1015 End: 01/21/25 1025                chlorhexidine gluconate (HIBICLENS) 4 % topical liquid  Freq: Daily PRN Route: TP  PRN Comment: Surgical Scrub  Start: 01/21/25 0716 End: 01/21/25 1343   Admin Instructions:                   ePHEDrine injection  Freq: As needed Route: IV  Start: 01/21/25 0943 End: 01/21/25 1018                fentaNYL (SUBLIMAZE) injection 25 mcg  Dose: 25 mcg  Freq: Every 5 minutes PRN Route: IV  PRN Reason: Pain - PACU  PRN Comment: Breakthrough - first line  Indications of Use: PAIN  Start: 01/21/25 1025 End: 01/21/25 1142   Admin Instructions:                   fentaNYL injection  Freq: As needed Route: IV  Start: 01/21/25 0932 End: 01/21/25 1018                HYDROmorphone HCl (DILAUDID) injection 0.2 mg  Dose: 0.2 mg  Freq: Every 4 hours PRN Route: IV  PRN Reason: breakthrough pain  Start: 01/30/25 2021   Admin Instructions:                0213     1158        lidocaine (PF) (XYLOCAINE-MPF) 1 % injection  Freq: As needed Route: IV  Start: 01/21/25 0932 End: 01/21/25 1018                LORazepam (ATIVAN) injection 1 mg  Dose: 1 mg  Freq: Once as needed Route: IV  PRN Reason: anxiety  PRN Comment: prior to MRI  Start: 01/30/25 2024 End: 01/31/25 1138   Admin Instructions:                1138        metoclopramide (REGLAN) injection  Freq: As needed Route: IV  Start: 01/21/25 0929 End: 01/21/25 1018                 midazolam (VERSED) injection  Freq: As needed Route: IV  Start: 01/21/25 0928 End: 01/21/25 1018                ondansetron (ZOFRAN) injection  Freq: As needed Route: IV  Start: 01/21/25 0929 End: 01/21/25 1018                ondansetron (ZOFRAN) injection 4 mg  Dose: 4 mg  Freq: Every 4 hours PRN Route: IV  PRN Reasons: nausea,vomiting  Start: 01/30/25 2020   Admin Instructions:                   oxyCODONE (ROXICODONE) split tablet 2.5 mg  Dose: 2.5 mg  Freq: Every 6 hours PRN Route: PO  PRN Reasons: moderate pain,severe pain  Start: 01/30/25 2021   Admin Instructions:               2119         phenylephrine 1,000 mcg/10 mL prefilled syringe  Freq: As needed Route: IV  Start: 01/21/25 0941 End: 01/21/25 1018                propofol (DIPRIVAN) 1000 mg in 100 mL infusion (premix)  Freq: As needed Route: IV  Start: 01/21/25 0932 End: 01/21/25 1018                sodium chloride 0.9 % irrigation solution  Freq: As needed  Start: 01/21/25 1002 End: 01/21/25 1025                Legend:       [id number]/2301/2401/2501/2601/2701/2801/2901/3001/31          ED Triage Vitals [01/30/25 1444]   Temperature Pulse Respirations Blood Pressure SpO2 Pain Score   97.8 °F (36.6 °C) 64 19 (!) 212/86 98 % 9     Weight (last 2 days)       Date/Time Weight    02/02/25 1816 105 (232)            Vital Signs (last 3 days)       Date/Time Temp Pulse Resp BP MAP (mmHg) SpO2 O2 Device Patient Position - Orthostatic VS Pain    02/03/25 09:29:37 97.8 °F (36.6 °C) 55 16 158/75 103 95 % None (Room air) -- 3    02/03/25 0528 -- -- -- -- -- -- -- -- 8    02/03/25 0518 -- -- -- -- -- -- -- -- 3    02/03/25 0332 -- -- -- -- -- -- -- -- 8 02/03/25 00:36:01 -- -- -- 158/76 103 96 % -- -- --    02/02/25 2142 -- 59 18 167/81 110 -- -- -- --    02/02/25 2136 -- -- -- -- -- -- -- -- 8 02/02/25 2135 -- -- -- -- -- -- -- -- 8 02/02/25 20:23:25 97.6 °F (36.4 °C) 61 20 165/82 110 93 % None (Room air) -- --    02/02/25 15:36:56 -- -- -- 156/80  105 -- -- -- --    02/02/25 1507 -- -- -- -- -- -- -- -- 6    02/02/25 07:51:54 97.8 °F (36.6 °C) 60 19 155/81 106 95 % None (Room air) -- No Pain    02/02/25 0541 -- -- -- -- -- -- -- -- 3    02/01/25 2147 -- -- -- -- -- -- -- -- 2    02/01/25 21:46:04 -- 62 -- 155/80 105 -- -- -- --    02/01/25 1947 -- -- -- -- -- -- -- -- 8 02/01/25 19:31:26 97.7 °F (36.5 °C) 67 20 150/82 105 94 % None (Room air) Lying --    02/01/25 15:06:23 97.7 °F (36.5 °C) 58 -- 137/78 98 -- -- -- --    02/01/25 1330 -- -- -- -- -- -- -- -- Med Not Given for Pain - for MAR use only    02/01/25 07:37:46 97.2 °F (36.2 °C) 61 18 134/83 100 95 % -- -- No Pain    02/01/25 0536 -- -- -- -- -- -- -- -- 8 02/01/25 0533 -- -- -- -- -- -- -- -- 8 01/31/25 2320 -- -- -- -- -- -- -- -- 10 - Worst Possible Pain    01/31/25 2300 97.9 °F (36.6 °C) 57 -- 147/68 98 96 % None (Room air) Lying --    01/31/25 2147 -- 56 -- -- -- -- -- -- 3    01/31/25 21:46:43 -- -- -- 130/85 100 -- -- -- --    01/31/25 20:00:50 97.5 °F (36.4 °C) 57 18 126/88 101 95 % None (Room air) -- No Pain    01/31/25 15:45:22 -- -- -- 132/64 87 -- -- -- --    01/31/25 15:45:12 -- -- -- 132/64 87 -- -- -- --    01/31/25 1420 -- -- -- -- -- -- -- -- Med Not Given for Pain - for MAR use only    01/31/25 1158 -- -- -- -- -- -- -- -- 9    01/31/25 08:33:45 97.4 °F (36.3 °C) 60 18 134/81 99 96 % None (Room air) -- No Pain    01/31/25 0528 -- -- -- -- -- -- -- -- 3    01/31/25 02:13:12 97.1 °F (36.2 °C) 56 18 134/58 83 95 % None (Room air) Lying --    01/31/25 0213 -- -- -- -- -- -- -- -- 7              Pertinent Labs/Diagnostic Test Results:   Radiology:  MRI lumbar spine wo contrast   Final Interpretation by Pallav N Shah, MD (01/31 1413)      Multilevel degenerative discogenic disease, most notably at L4-L5 where there is a central and left central disc protrusion resulting in severe spinal stenosis and compression of the nerve roots of the cauda equina. Surgical consultation is  recommended.      Additional findings of degenerative discogenic disease at L5-S1 with endplate osteophytic ridging and facet arthropathy resulting in right greater than left foraminal stenosis and bilateral subarticular zone encroachment. Correlate for right greater than    left L5 and bilateral S1 radiculitis.      The study was marked in EPIC for immediate notification.         Workstation performed: [id number]         CT lumbar spine without contrast   Final Interpretation by Ricky Larson MD (01/30 1830)      Chronic disc and facet degenerative change resulting in nerve root encroachment in the lower lumbar spine.      L4-5 left paracentral to foraminal zone disc protrusion encroaching on the traversing left L5 nerve root. MRI may be helpful for further evaluation.      Workstation performed: [id number]         XR tibia fibula 2 views LEFT   Final Interpretation by Izabel Vu MD (01/30 1609)      No acute osseous abnormality.            Computerized Assisted Algorithm (CAA) may have been used to analyze all applicable images.               Workstation performed: [id number]         XR foot 3+ vw left   Final Interpretation by Izabel Vu MD (01/30 1608)      No acute osseous abnormality.         Computerized Assisted Algorithm (CAA) may have been used to analyze all applicable images.         Workstation performed: [id number]           Cardiology:  No orders to display     GI:  No orders to display           Results from last 7 days   Lab Units 02/03/25  0525 02/02/25  0546 02/01/25  0542 01/31/25  0637 01/30/25  1743   WBC Thousand/uL 9.51 9.42 10.06 13.33* 12.52*   HEMOGLOBIN g/dL 14.8 14.3 14.4 15.4 15.4   HEMATOCRIT % 45.1 43.3 43.7 46.3 46.3   PLATELETS Thousands/uL 183 185 171 219 217   TOTAL NEUT ABS Thousands/µL 6.16 6.26 6.69  --  9.19*         Results from last 7 days   Lab Units 02/03/25  0525 02/02/25  0546 02/01/25  0542 01/31/25  0533 01/30/25  1743   SODIUM mmol/L 141 142 141 140  "141   POTASSIUM mmol/L 3.7 3.4* 3.5 3.9 3.8   CHLORIDE mmol/L 111* 111* 111* 107 107   CO2 mmol/L 17* 19* 18* 19* 21   ANION GAP mmol/L 13 12 12 14* 13   BUN mg/dL 21 23 29* 30* 29*   CREATININE mg/dL 1.36* 1.51* 1.60* 1.82* 1.76*   EGFR ml/min/1.73sq m 49 43 40 34 36   CALCIUM mg/dL 8.9 9.0 9.2 9.8 10.3*   MAGNESIUM mg/dL  --   --   --  2.0  --      Results from last 7 days   Lab Units 01/30/25  1743   AST U/L 23   ALT U/L 24   ALK PHOS U/L 55   TOTAL PROTEIN g/dL 7.4   ALBUMIN g/dL 4.5   TOTAL BILIRUBIN mg/dL 0.49   BILIRUBIN DIRECT mg/dL 0.10     Results from last 7 days   Lab Units 02/03/25  1106 02/03/25  0802 02/03/25  0214 02/02/25  2013 02/02/25  1633 02/02/25  1121 02/02/25  0716 02/02/25  0220 02/01/25  2122 02/01/25  1715 02/01/25  1123 02/01/25  0738   POC GLUCOSE mg/dl 135 118 100 100 80 133 125 103 106 170* 115 165*     Results from last 7 days   Lab Units 02/03/25  0525 02/02/25  0546 02/01/25  0542 01/31/25  0533 01/30/25  1743   GLUCOSE RANDOM mg/dL 104 113 125 175* 135             No results found for: \"BETA-HYDROXYBUTYRATE\"                                                                                       Results from last 7 days   Lab Units 01/31/25  1159   CLARITY UA  Clear   COLOR UA  Yellow   SPEC GRAV UA  1.015   PH UA  5.0   GLUCOSE UA mg/dl 1000 (1%)*   KETONES UA mg/dl Negative   BLOOD UA  Negative   PROTEIN UA mg/dl Negative   NITRITE UA  Negative   BILIRUBIN UA  Negative   UROBILINOGEN UA (BE) mg/dl <2.0   LEUKOCYTES UA  Negative   WBC UA /hpf None Seen   RBC UA /hpf 0-1*   BACTERIA UA /hpf None Seen   EPITHELIAL CELLS WET PREP /hpf Occasional                                                   Past Medical History:   Diagnosis Date    Arthritis     Chronic kidney disease     Diabetes mellitus (HCC)     History of wound infection ?2013    RIGHT LOWER LEG. WAS + FOR STAPH INFECTION AT THAT TIME    Hypertension     Shoulder abrasion     right side after a fall in Feb 2018     Present on " Admission:   Smoking - cigars daily   Gout   Diabetes mellitus, type 2 (HCC)   HTN (hypertension)   Hyperlipidemia      Admitting Diagnosis: Sciatica [M54.30]  Leg pain [M79.606]  Ambulatory dysfunction [R26.2]  Age/Sex: 79 y.o. male    Network Utilization Review Department  ATTENTION: Please call with any questions or concerns to [phone] and carefully listen to the prompts so that you are directed to the right person. All voicemails are confidential.   For Discharge needs, contact Care Management DC Support Team at [phone] opt. 2  Send all requests for admission clinical reviews, approved or denied determinations and any other requests to dedicated fax number below belonging to the campus where the patient is receiving treatment. List of dedicated fax numbers for the Facilities:  FACILITY NAME UR FAX NUMBER   ADMISSION DENIALS (Administrative/Medical Necessity) [phone]   DISCHARGE SUPPORT TEAM (NETWORK) [phone]   PARENT CHILD HEALTH (Maternity/NICU/Pediatrics) [phone]   [facility] [phone]   [facility] [phone]   [facility] [phone]   [facility] [phone]   [facility] [phone]   [facility] [phone]   [facility] [phone]   [facility] [phone]   [facility] [phone]   [facility] [phone]   [facility] [phone]   [facility] [phone]         Continued Stay Review    Date: 2-1-25  Day 3: Has surpassed a 2nd midnight with active treatments and services for persistent severe back pain.                 Current Patient Class: Inpatient Current Level of Care: med surg    HPI:79 y.o. male initially admitted on 1-30-25     Acute right sided sciatica pain.    S/p L foot surgery 1/21/2025     Assessment/Plan:     Back pain 8/10.  Treated with po oxycodone and scheduled tylenol.  Flexeril added prn for back spasms.  Neuro surgical consult completed: plan conservative measures due to uncontrolled diabetes. Podiatry consult completed:  left foot wound care continues.  Weight bearing as tolerated with left foot in surgical shoe.  Creatinine 1.4-1.9. Today  creatinine 1.60.  Diuretics on hold and  iv fluids completed at 1056.      Scheduled Medications:    acetaminophen, 975 mg, Oral, Q8H JANELL  allopurinol, 100 mg, Oral, BID  aspirin, 81 mg, Oral, Daily  Empagliflozin, 10 mg, Oral, Daily With Dinner  enoxaparin, 30 mg, Subcutaneous, Daily  eplerenone, 25 mg, Oral, Daily  fish oil, 1,000 mg, Oral, BID  gabapentin, 100 mg, Oral, HS  glipiZIDE, 5 mg, Oral, QAM  insulin aspart protamine-insulin aspart, 50 Units, Subcutaneous, BID AC  insulin lispro, 1-5 Units, Subcutaneous, TID AC  insulin lispro, 1-5 Units, Subcutaneous, HS  losartan, 50 mg, Oral, Daily  metoprolol succinate, 150 mg, Oral, HS  pravastatin, 40 mg, Oral, Daily With Dinner  saccharomyces boulardii, 250 mg, Oral, BID  torsemide, 20 mg, Oral, Daily   And  torsemide, 10 mg, Oral, After Dinner      Continuous IV Infusions:     PRN Meds:  cyclobenzaprine, 10 mg, Oral, TID PRN  HYDROmorphone, 0.2 mg, Intravenous, Q4H PRN  ondansetron, 4 mg, Intravenous, Q4H PRN  oxyCODONE, 5 mg, Oral, Q6H PRN  oxyCODONE, 2.5 mg, Oral, Q6H PRN      Discharge Plan: to be determination     Vital Signs (last 3 days)       Date/Time Temp Pulse Resp BP MAP (mmHg) SpO2 O2 Device Pain    02/01/25 07:37:46 97.2 °F (36.2 °C) 61 18 134/83 100 95 % -- No Pain    02/01/25 0536 -- -- -- -- -- -- -- 8    02/01/25 0533 -- -- -- -- -- -- -- 8    01/31/25 2320 -- -- -- -- -- -- -- 10 - Worst Possible Pain    01/31/25 2300 97.9 °F (36.6 °C) 57 -- 147/68 98 96 % None (Room air) --    01/31/25 2147 -- 56 -- -- -- -- -- 3     01/31/25 21:46:43 -- -- -- 130/85 100 -- -- --    01/31/25 20:00:50 97.5 °F (36.4 °C) 57 18 126/88 101 95 % None (Room air) No Pain    01/31/25 15:45:22 -- -- -- 132/64 87 -- -- --    01/31/25 15:45:12 -- -- -- 132/64 87 -- -- --    01/31/25 1420 -- -- -- -- -- -- -- Med Not Given for Pain - for MAR use only    01/31/25 1158 -- -- -- -- -- -- -- 9    01/31/25 08:33:45 97.4 °F (36.3 °C) 60 18 134/81 99 96 % None (Room air) No Pain    01/31/25 0528 -- -- -- -- -- -- -- 3    01/31/25 02:13:12 97.1 °F (36.2 °C) 56 18 134/58 83 95 % None (Room air) --    01/31/25 0213 -- -- -- -- -- -- -- 7    01/30/25 2120 -- -- -- -- -- -- -- 7    01/30/25 2119 -- -- -- -- -- -- -- 7    01/30/25 2035 -- -- -- -- -- -- -- No Pain    01/30/25 20:13:31 98 °F (36.7 °C) -- 18 136/72 93 -- -- --    01/30/25 1847 -- 58 18 152/68 -- 97 % None (Room air) --    01/30/25 1444 97.8 °F (36.6 °C) 64 19 212/86 -- 98 % None (Room air) 9          Weight (last 2 days)       Date/Time Weight    02/02/25 1816 105 (232)            Pertinent Labs/Diagnostic Results:   Radiology:  MRI lumbar spine wo contrast   Final  (01/31 1413)      Multilevel degenerative discogenic disease, most notably at L4-L5 where there is a central and left central disc protrusion resulting in severe spinal stenosis and compression of the nerve roots of the cauda equina. Surgical consultation is recommended.      Additional findings of degenerative discogenic disease at L5-S1 with endplate osteophytic ridging and facet arthropathy resulting in right greater than left foraminal stenosis and bilateral subarticular zone encroachment. Correlate for right greater than left L5 and bilateral S1 radiculitis.            CT lumbar spine without contrast   Final  (01/30 1830)      Chronic disc and facet degenerative change resulting in nerve root encroachment in the lower lumbar spine.      L4-5 left paracentral to foraminal zone disc protrusion encroaching on the traversing left L5 nerve  root. MRI may be helpful for further evaluation.         XR tibia fibula 2 views LEFT   Final (01/30 1609)      No acute osseous abnormality.         XR foot 3+ vw left   Final (01/30 1608)      No acute osseous abnormality.        Cardiology:  No orders to display     GI:  No orders to display           Results from last 7 days   Lab Units 02/03/25  0525 02/02/25  0546 02/01/25  0542 01/31/25  0637 01/30/25  1743   WBC Thousand/uL 9.51 9.42 10.06 13.33* 12.52*   HEMOGLOBIN g/dL 14.8 14.3 14.4 15.4 15.4   HEMATOCRIT % 45.1 43.3 43.7 46.3 46.3   PLATELETS Thousands/uL 183 185 171 219 217   TOTAL NEUT ABS Thousands/µL 6.16 6.26 6.69  --  9.19*         Results from last 7 days   Lab Units 02/03/25  0525 02/02/25  0546 02/01/25  0542 01/31/25  0533 01/30/25  1743   SODIUM mmol/L 141 142 141 140 141   POTASSIUM mmol/L 3.7 3.4* 3.5 3.9 3.8   CHLORIDE mmol/L 111* 111* 111* 107 107   CO2 mmol/L 17* 19* 18* 19* 21   ANION GAP mmol/L 13 12 12 14* 13   BUN mg/dL 21 23 29* 30* 29*   CREATININE mg/dL 1.36* 1.51* 1.60* 1.82* 1.76*   EGFR ml/min/1.73sq m 49 43 40 34 36   CALCIUM mg/dL 8.9 9.0 9.2 9.8 10.3*   MAGNESIUM mg/dL  --   --   --  2.0  --      Results from last 7 days   Lab Units 01/30/25  1743   AST U/L 23   ALT U/L 24   ALK PHOS U/L 55   TOTAL PROTEIN g/dL 7.4   ALBUMIN g/dL 4.5   TOTAL BILIRUBIN mg/dL 0.49   BILIRUBIN DIRECT mg/dL 0.10     Results from last 7 days   Lab Units 02/03/25  1106 02/03/25  0802 02/03/25  0214 02/02/25 2013 02/02/25  1633 02/02/25  1121 02/02/25  0716 02/02/25  0220 02/01/25  2122 02/01/25  1715 02/01/25  1123 02/01/25  0738   POC GLUCOSE mg/dl 135 118 100 100 80 133 125 103 106 170* 115 165*     Results from last 7 days   Lab Units 02/03/25  0525 02/02/25  0546 02/01/25  0542 01/31/25  0533 01/30/25  1743   GLUCOSE RANDOM mg/dL 104 113 125 175* 135       Results from last 7 days   Lab Units 01/31/25  1159   CLARITY UA  Clear   COLOR UA  Yellow   SPEC GRAV UA  1.015   PH UA  5.0   GLUCOSE UA  mg/dl 1000 (1%)*   KETONES UA mg/dl Negative   BLOOD UA  Negative   PROTEIN UA mg/dl Negative   NITRITE UA  Negative   BILIRUBIN UA  Negative   UROBILINOGEN UA (BE) mg/dl <2.0   LEUKOCYTES UA  Negative   WBC UA /hpf None Seen   RBC UA /hpf 0-1*   BACTERIA UA /hpf None Seen   EPITHELIAL CELLS WET PREP /hpf Occasional     Network Utilization Review Department  ATTENTION: Please call with any questions or concerns to [phone] and carefully listen to the prompts so that you are directed to the right person. All voicemails are confidential.   For Discharge needs, contact Care Management DC Support Team at [phone] opt. 2  Send all requests for admission clinical reviews, approved or denied determinations and any other requests to dedicated fax number below belonging to the campus where the patient is receiving treatment. List of dedicated fax numbers for the Facilities:  FACILITY NAME UR FAX NUMBER   ADMISSION DENIALS (Administrative/Medical Necessity) [phone]   DISCHARGE SUPPORT TEAM (NETWORK) [phone]   PARENT CHILD HEALTH (Maternity/NICU/Pediatrics) [phone]   [facility] [phone]   [facility] [phone]   [facility] [phone]   [facility] [phone]   [facility] [phone]   [facility] [phone]   [facility] [phone]   [facility] [phone]   [facility] [phone]   [facility] [phone]   [facility] [phone]   [facility] [phone]

## 2025-02-03 NOTE — OCCUPATIONAL THERAPY NOTE
Occupational Therapy Cancellation Note       02/03/25 1510   Note Type   Note type Cancelled Session   Cancel Reasons Refusal   Additional Comments OT orders received and chart reviewed. Attempted to see pt for OT eval however pt declined, states he was up and OOB not long ago and was very painful, not wanting to mobilize at this time and exacerbate his pain. Will re-attempt as time/schedule permits.     Charlotte Cole OTR/L   [place] License # [id number]  [place] License # [id number]

## 2025-02-03 NOTE — TELEPHONE ENCOUNTER
Caller: Spouse -Karolyn     Doctor: Dr. White     Reason for call: Patient had to cancel his post op due to being in the hospital please contact wife to reschedule post op     Please advise     Call back#: [phone]  or [phone]

## 2025-02-03 NOTE — ASSESSMENT & PLAN NOTE
Lab Results   Component Value Date    HGBA1C 8.2 (H) 12/30/2024       Recent Labs     02/02/25  2013 02/03/25  0214 02/03/25  0802 02/03/25  1106   POCGLU 100 100 118 135       Blood Sugar Average: Last 72 hrs:  (P) 135.6322536992807972  On Farxiga, glipizide, Humalog 75/25 60-58 units twice daily at home.  Continue NovoLog 70/30 50 units SQ twice daily before meals  SSI ACHS  Diabetic diet  Recent A1c 8.2

## 2025-02-04 ENCOUNTER — HOSPITAL ENCOUNTER (INPATIENT)
Facility: HOSPITAL | Age: 80
LOS: 3 days | Discharge: NURSING, REHAB OR LONG-TERM CARE | End: 2025-02-07
Attending: INTERNAL MEDICINE
Payer: COMMERCIAL

## 2025-02-04 VITALS
WEIGHT: 232 LBS | RESPIRATION RATE: 18 BRPM | HEIGHT: 68 IN | SYSTOLIC BLOOD PRESSURE: 150 MMHG | TEMPERATURE: 97.7 F | OXYGEN SATURATION: 94 % | BODY MASS INDEX: 35.16 KG/M2 | DIASTOLIC BLOOD PRESSURE: 90 MMHG | HEART RATE: 63 BPM

## 2025-02-04 DIAGNOSIS — M54.41 ACUTE LOW BACK PAIN WITH RIGHT-SIDED SCIATICA, UNSPECIFIED BACK PAIN LATERALITY: Primary | ICD-10-CM

## 2025-02-04 PROBLEM — M54.42 ACUTE BILATERAL LOW BACK PAIN WITH BILATERAL SCIATICA: Status: ACTIVE | Noted: 2025-01-30

## 2025-02-04 LAB
ANION GAP SERPL CALCULATED.3IONS-SCNC: 14 MMOL/L (ref 4–13)
BUN SERPL-MCNC: 21 MG/DL (ref 5–25)
CALCIUM SERPL-MCNC: 9.2 MG/DL (ref 8.4–10.2)
CHLORIDE SERPL-SCNC: 111 MMOL/L (ref 96–108)
CO2 SERPL-SCNC: 18 MMOL/L (ref 21–32)
CREAT SERPL-MCNC: 1.38 MG/DL (ref 0.6–1.3)
GFR SERPL CREATININE-BSD FRML MDRD: 48 ML/MIN/1.73SQ M
GLUCOSE SERPL-MCNC: 101 MG/DL (ref 65–140)
GLUCOSE SERPL-MCNC: 122 MG/DL (ref 65–140)
GLUCOSE SERPL-MCNC: 84 MG/DL (ref 65–140)
GLUCOSE SERPL-MCNC: 93 MG/DL (ref 65–140)
GLUCOSE SERPL-MCNC: 97 MG/DL (ref 65–140)
POTASSIUM SERPL-SCNC: 3.3 MMOL/L (ref 3.5–5.3)
SODIUM SERPL-SCNC: 143 MMOL/L (ref 135–147)

## 2025-02-04 PROCEDURE — 83540 ASSAY OF IRON: CPT | Performed by: STUDENT IN AN ORGANIZED HEALTH CARE EDUCATION/TRAINING PROGRAM

## 2025-02-04 PROCEDURE — 82728 ASSAY OF FERRITIN: CPT | Performed by: STUDENT IN AN ORGANIZED HEALTH CARE EDUCATION/TRAINING PROGRAM

## 2025-02-04 PROCEDURE — 83550 IRON BINDING TEST: CPT | Performed by: STUDENT IN AN ORGANIZED HEALTH CARE EDUCATION/TRAINING PROGRAM

## 2025-02-04 PROCEDURE — 80048 BASIC METABOLIC PNL TOTAL CA: CPT | Performed by: INTERNAL MEDICINE

## 2025-02-04 PROCEDURE — 82948 REAGENT STRIP/BLOOD GLUCOSE: CPT

## 2025-02-04 PROCEDURE — 99223 1ST HOSP IP/OBS HIGH 75: CPT | Performed by: INTERNAL MEDICINE

## 2025-02-04 PROCEDURE — 99232 SBSQ HOSP IP/OBS MODERATE 35: CPT | Performed by: INTERNAL MEDICINE

## 2025-02-04 PROCEDURE — 82746 ASSAY OF FOLIC ACID SERUM: CPT | Performed by: STUDENT IN AN ORGANIZED HEALTH CARE EDUCATION/TRAINING PROGRAM

## 2025-02-04 RX ORDER — INSULIN LISPRO 100 [IU]/ML
1-5 INJECTION, SOLUTION INTRAVENOUS; SUBCUTANEOUS
Status: DISCONTINUED | OUTPATIENT
Start: 2025-02-05 | End: 2025-02-06

## 2025-02-04 RX ORDER — LIDOCAINE 50 MG/G
1 PATCH TOPICAL DAILY
Start: 2025-02-04 | End: 2025-02-04

## 2025-02-04 RX ORDER — GLIPIZIDE 5 MG/1
5 TABLET ORAL EVERY MORNING
Status: DISCONTINUED | OUTPATIENT
Start: 2025-02-05 | End: 2025-02-04

## 2025-02-04 RX ORDER — INSULIN LISPRO 100 [IU]/ML
1-5 INJECTION, SOLUTION INTRAVENOUS; SUBCUTANEOUS
Start: 2025-02-04 | End: 2025-02-04

## 2025-02-04 RX ORDER — GABAPENTIN 300 MG/1
300 CAPSULE ORAL 3 TIMES DAILY
Status: DISCONTINUED | OUTPATIENT
Start: 2025-02-04 | End: 2025-02-04 | Stop reason: HOSPADM

## 2025-02-04 RX ORDER — EPLERENONE 25 MG/1
25 TABLET ORAL DAILY
Status: DISCONTINUED | OUTPATIENT
Start: 2025-02-05 | End: 2025-02-07 | Stop reason: HOSPADM

## 2025-02-04 RX ORDER — SACCHAROMYCES BOULARDII 250 MG
250 CAPSULE ORAL 2 TIMES DAILY
Status: DISCONTINUED | OUTPATIENT
Start: 2025-02-05 | End: 2025-02-07 | Stop reason: HOSPADM

## 2025-02-04 RX ORDER — OXYCODONE HYDROCHLORIDE 5 MG/1
2.5 TABLET ORAL EVERY 6 HOURS PRN
Start: 2025-02-04 | End: 2025-02-04

## 2025-02-04 RX ORDER — CYCLOBENZAPRINE HCL 10 MG
10 TABLET ORAL 3 TIMES DAILY PRN
Start: 2025-02-04 | End: 2025-02-04

## 2025-02-04 RX ORDER — ACETAMINOPHEN 325 MG/1
975 TABLET ORAL EVERY 8 HOURS SCHEDULED
Status: DISCONTINUED | OUTPATIENT
Start: 2025-02-04 | End: 2025-02-07 | Stop reason: HOSPADM

## 2025-02-04 RX ORDER — SACCHAROMYCES BOULARDII 250 MG
250 CAPSULE ORAL 2 TIMES DAILY
Start: 2025-02-04 | End: 2025-02-04

## 2025-02-04 RX ORDER — ENOXAPARIN SODIUM 100 MG/ML
40 INJECTION SUBCUTANEOUS
Status: DISCONTINUED | OUTPATIENT
Start: 2025-02-05 | End: 2025-02-07 | Stop reason: HOSPADM

## 2025-02-04 RX ORDER — GABAPENTIN 300 MG/1
300 CAPSULE ORAL 3 TIMES DAILY
Status: CANCELLED | OUTPATIENT
Start: 2025-02-04

## 2025-02-04 RX ORDER — WARFARIN SODIUM 5 MG/1
5 TABLET ORAL
Status: DISCONTINUED | OUTPATIENT
Start: 2025-02-04 | End: 2025-02-04

## 2025-02-04 RX ORDER — HYDROMORPHONE HCL IN WATER/PF 6 MG/30 ML
0.2 PATIENT CONTROLLED ANALGESIA SYRINGE INTRAVENOUS EVERY 4 HOURS PRN
Refills: 0 | Status: DISCONTINUED | OUTPATIENT
Start: 2025-02-04 | End: 2025-02-07 | Stop reason: HOSPADM

## 2025-02-04 RX ORDER — ASPIRIN 81 MG/1
81 TABLET ORAL DAILY
Status: DISCONTINUED | OUTPATIENT
Start: 2025-02-05 | End: 2025-02-07 | Stop reason: HOSPADM

## 2025-02-04 RX ORDER — ENOXAPARIN SODIUM 100 MG/ML
30 INJECTION SUBCUTANEOUS DAILY
Status: DISCONTINUED | OUTPATIENT
Start: 2025-02-05 | End: 2025-02-04

## 2025-02-04 RX ORDER — CHLORAL HYDRATE 500 MG
1000 CAPSULE ORAL 2 TIMES DAILY
Status: DISCONTINUED | OUTPATIENT
Start: 2025-02-05 | End: 2025-02-07 | Stop reason: HOSPADM

## 2025-02-04 RX ORDER — ALLOPURINOL 100 MG/1
100 TABLET ORAL 2 TIMES DAILY
Status: DISCONTINUED | OUTPATIENT
Start: 2025-02-05 | End: 2025-02-07 | Stop reason: HOSPADM

## 2025-02-04 RX ORDER — POTASSIUM CHLORIDE 1500 MG/1
40 TABLET, EXTENDED RELEASE ORAL ONCE
Status: COMPLETED | OUTPATIENT
Start: 2025-02-04 | End: 2025-02-04

## 2025-02-04 RX ORDER — OXYCODONE HYDROCHLORIDE 5 MG/1
5 TABLET ORAL EVERY 6 HOURS PRN
Refills: 0 | Status: DISCONTINUED | OUTPATIENT
Start: 2025-02-04 | End: 2025-02-05

## 2025-02-04 RX ORDER — HYDROMORPHONE HCL IN WATER/PF 6 MG/30 ML
0.2 PATIENT CONTROLLED ANALGESIA SYRINGE INTRAVENOUS EVERY 4 HOURS PRN
Start: 2025-02-04 | End: 2025-02-04

## 2025-02-04 RX ORDER — ACETAMINOPHEN 325 MG/1
975 TABLET ORAL EVERY 8 HOURS SCHEDULED
Start: 2025-02-04 | End: 2025-02-04

## 2025-02-04 RX ORDER — LIDOCAINE 50 MG/G
1 PATCH TOPICAL DAILY
Status: DISCONTINUED | OUTPATIENT
Start: 2025-02-04 | End: 2025-02-04 | Stop reason: HOSPADM

## 2025-02-04 RX ORDER — WARFARIN SODIUM 5 MG/1
10 TABLET ORAL
Status: DISCONTINUED | OUTPATIENT
Start: 2025-02-05 | End: 2025-02-04

## 2025-02-04 RX ORDER — LOSARTAN POTASSIUM 50 MG/1
50 TABLET ORAL DAILY
Status: DISCONTINUED | OUTPATIENT
Start: 2025-02-05 | End: 2025-02-07 | Stop reason: HOSPADM

## 2025-02-04 RX ORDER — ONDANSETRON 2 MG/ML
4 INJECTION INTRAMUSCULAR; INTRAVENOUS EVERY 4 HOURS PRN
Status: DISCONTINUED | OUTPATIENT
Start: 2025-02-04 | End: 2025-02-07 | Stop reason: HOSPADM

## 2025-02-04 RX ORDER — GABAPENTIN 300 MG/1
300 CAPSULE ORAL 3 TIMES DAILY
Start: 2025-02-04 | End: 2025-02-04

## 2025-02-04 RX ORDER — PRAVASTATIN SODIUM 40 MG
40 TABLET ORAL
Status: DISCONTINUED | OUTPATIENT
Start: 2025-02-05 | End: 2025-02-07 | Stop reason: HOSPADM

## 2025-02-04 RX ORDER — LIDOCAINE 50 MG/G
1 PATCH TOPICAL DAILY
Status: CANCELLED | OUTPATIENT
Start: 2025-02-05

## 2025-02-04 RX ORDER — LIDOCAINE 50 MG/G
1 PATCH TOPICAL DAILY
Status: DISCONTINUED | OUTPATIENT
Start: 2025-02-05 | End: 2025-02-07 | Stop reason: HOSPADM

## 2025-02-04 RX ORDER — CYCLOBENZAPRINE HCL 10 MG
10 TABLET ORAL 3 TIMES DAILY PRN
Status: DISCONTINUED | OUTPATIENT
Start: 2025-02-04 | End: 2025-02-05

## 2025-02-04 RX ORDER — ENOXAPARIN SODIUM 100 MG/ML
30 INJECTION SUBCUTANEOUS DAILY
Start: 2025-02-05 | End: 2025-02-04

## 2025-02-04 RX ORDER — INSULIN LISPRO 100 [IU]/ML
1-5 INJECTION, SOLUTION INTRAVENOUS; SUBCUTANEOUS
Status: DISCONTINUED | OUTPATIENT
Start: 2025-02-04 | End: 2025-02-06

## 2025-02-04 RX ORDER — OXYCODONE HYDROCHLORIDE 5 MG/1
5 TABLET ORAL EVERY 6 HOURS PRN
Start: 2025-02-04 | End: 2025-02-04

## 2025-02-04 RX ORDER — INSULIN ASPART 100 [IU]/ML
50 INJECTION, SUSPENSION SUBCUTANEOUS
Status: DISCONTINUED | OUTPATIENT
Start: 2025-02-05 | End: 2025-02-06

## 2025-02-04 RX ORDER — GABAPENTIN 300 MG/1
300 CAPSULE ORAL 3 TIMES DAILY
Status: DISCONTINUED | OUTPATIENT
Start: 2025-02-04 | End: 2025-02-07 | Stop reason: HOSPADM

## 2025-02-04 RX ADMIN — INSULIN ASPART 50 UNITS: 100 INJECTION, SUSPENSION SUBCUTANEOUS at 08:23

## 2025-02-04 RX ADMIN — OMEGA-3 FATTY ACIDS CAP 1000 MG 1000 MG: 1000 CAP at 08:11

## 2025-02-04 RX ADMIN — LOSARTAN POTASSIUM 50 MG: 50 TABLET, FILM COATED ORAL at 08:11

## 2025-02-04 RX ADMIN — ALLOPURINOL 100 MG: 100 TABLET ORAL at 08:11

## 2025-02-04 RX ADMIN — OXYCODONE HYDROCHLORIDE 5 MG: 5 TABLET ORAL at 10:07

## 2025-02-04 RX ADMIN — ACETAMINOPHEN 975 MG: 325 TABLET, FILM COATED ORAL at 22:08

## 2025-02-04 RX ADMIN — GABAPENTIN 300 MG: 300 CAPSULE ORAL at 16:24

## 2025-02-04 RX ADMIN — Medication 250 MG: at 08:11

## 2025-02-04 RX ADMIN — ALLOPURINOL 100 MG: 100 TABLET ORAL at 17:14

## 2025-02-04 RX ADMIN — GABAPENTIN 300 MG: 300 CAPSULE ORAL at 22:08

## 2025-02-04 RX ADMIN — TORSEMIDE 10 MG: 10 TABLET ORAL at 17:14

## 2025-02-04 RX ADMIN — EMPAGLIFLOZIN 10 MG: 10 TABLET, FILM COATED ORAL at 16:24

## 2025-02-04 RX ADMIN — TORSEMIDE 20 MG: 20 TABLET ORAL at 08:11

## 2025-02-04 RX ADMIN — GLIPIZIDE 5 MG: 5 TABLET ORAL at 08:11

## 2025-02-04 RX ADMIN — ENOXAPARIN SODIUM 30 MG: 30 INJECTION SUBCUTANEOUS at 08:11

## 2025-02-04 RX ADMIN — OMEGA-3 FATTY ACIDS CAP 1000 MG 1000 MG: 1000 CAP at 17:14

## 2025-02-04 RX ADMIN — ACETAMINOPHEN 975 MG: 325 TABLET ORAL at 16:23

## 2025-02-04 RX ADMIN — Medication 250 MG: at 17:14

## 2025-02-04 RX ADMIN — LIDOCAINE 5% 1 PATCH: 700 PATCH TOPICAL at 15:22

## 2025-02-04 RX ADMIN — OXYCODONE HYDROCHLORIDE 5 MG: 5 TABLET ORAL at 16:23

## 2025-02-04 RX ADMIN — ASPIRIN 81 MG: 81 TABLET, COATED ORAL at 08:11

## 2025-02-04 RX ADMIN — PRAVASTATIN SODIUM 40 MG: 40 TABLET ORAL at 16:24

## 2025-02-04 RX ADMIN — METOPROLOL SUCCINATE 150 MG: 100 TABLET, EXTENDED RELEASE ORAL at 22:09

## 2025-02-04 RX ADMIN — EPLERENONE 25 MG: 25 TABLET, FILM COATED ORAL at 08:14

## 2025-02-04 RX ADMIN — POTASSIUM CHLORIDE 40 MEQ: 1500 TABLET, EXTENDED RELEASE ORAL at 08:11

## 2025-02-04 RX ADMIN — ACETAMINOPHEN 975 MG: 325 TABLET ORAL at 05:54

## 2025-02-04 NOTE — ASSESSMENT & PLAN NOTE
Lab Results   Component Value Date    HGBA1C 8.2 (H) 12/30/2024       Recent Labs     02/03/25  1625 02/03/25  2157 02/04/25  0734 02/04/25  1059   POCGLU 78 83 122 93       Blood Sugar Average: Last 72 hrs:  (P) 115.0799110070046658  On Farxiga, glipizide, Humalog 75/25 60-58 units twice daily at home.  Continue NovoLog 70/30 50 units SQ twice daily before meals  SSI ACHS  Diabetic diet  Recent A1c 8.2

## 2025-02-04 NOTE — ASSESSMENT & PLAN NOTE
Lab Results   Component Value Date    HGBA1C 8.2 (H) 12/30/2024       Recent Labs     02/03/25  1625 02/03/25  2157 02/04/25  0734 02/04/25  1059   POCGLU 78 83 122 93       Blood Sugar Average: Last 72 hrs:  (P) 115.3730217500653740  On Farxiga, glipizide, Humalog 75/25 60-58 units twice daily at home.  Continue NovoLog 70/30 50 units SQ twice daily before meals  SSI ACHS  Diabetic diet  Recent A1c 8.2

## 2025-02-04 NOTE — PLAN OF CARE
Problem: PAIN - ADULT  Goal: Verbalizes/displays adequate comfort level or baseline comfort level  Description: Interventions:  - Encourage patient to monitor pain and request assistance  - Assess pain using appropriate pain scale  - Administer analgesics based on type and severity of pain and evaluate response  - Implement non-pharmacological measures as appropriate and evaluate response  - Consider cultural and social influences on pain and pain management  - Notify physician/advanced practitioner if interventions unsuccessful or patient reports new pain  Outcome: Progressing     Problem: SAFETY ADULT  Goal: Maintain or return to baseline ADL function  Description: INTERVENTIONS:  -  Assess patient's ability to carry out ADLs; assess patient's baseline for ADL function and identify physical deficits which impact ability to perform ADLs (bathing, care of mouth/teeth, toileting, grooming, dressing, etc.)  - Assess/evaluate cause of self-care deficits   - Assess range of motion  - Assess patient's mobility; develop plan if impaired  - Assess patient's need for assistive devices and provide as appropriate  - Encourage maximum independence but intervene and supervise when necessary  - Involve family in performance of ADLs  - Assess for home care needs following discharge   - Consider OT consult to assist with ADL evaluation and planning for discharge  - Provide patient education as appropriate  Outcome: Progressing     Problem: DISCHARGE PLANNING  Goal: Discharge to home or other facility with appropriate resources  Description: INTERVENTIONS:  - Identify barriers to discharge w/patient and caregiver  - Arrange for needed discharge resources and transportation as appropriate  - Identify discharge learning needs (meds, wound care, etc.)  - Arrange for interpretive services to assist at discharge as needed  - Refer to Case Management Department for coordinating discharge planning if the patient needs post-hospital  services based on physician/advanced practitioner order or complex needs related to functional status, cognitive ability, or social support system  Outcome: Progressing      3 = A little assistance

## 2025-02-04 NOTE — PROGRESS NOTES
Progress Note - Hospitalist   Name: Stewart Flores 79 y.o. male I MRN: [id number]  Unit/Bed#: 2 [facility] 219 A  Date of Admission: 1/30/2025   Date of Service: 2/4/2025 I Hospital Day: 5    Assessment & Plan  Acute low back pain with right-sided sciatica  Patient presented to the ED for acute onset of back pain x2 days. Radiation to the left leg. Difficulty ambulating.  No bowel or bladder dysfunction.    MRI of the lumbar spine: Multilevel degenerative discogenic disease, most notably at L4-L5 where there is a central and left central disc protrusion resulting in severe spinal stenosis and compression of the nerve roots of the cauda equina. Surgical consultation is recommended. Additional findings of degenerative discogenic disease at L5-S1 with endplate osteophytic ridging and facet arthropathy resulting in right greater than left foraminal stenosis and bilateral subarticular zone encroachment. Correlate for right greater than left L5 and bilateral S1 radiculitis.  Neurosurgery consultation appreciated.  Discussed with neurosurgery team today and they agreed on transfer since the patient is still complaining of a lot of pain.  Discussed with [facility] hospitalist at [facility] and the patient was accepted as transfer.  May take a couple days for transfer to happen due to the bed availability.  Patient would rather go to [facility] instead of [place]  Multimodal pain control with Tylenol 975mg Q8H JANELL, Gabapentin increase to 300mg TID, Lidocaine patch, Oxycodone PRN for mod-severe pain, Flexeril PRN muscle spasms  PT/OT evaluation and treatment  Smoking - cigars daily  Declined nicotine patch  Gout  Continue allopurinol  Diabetes mellitus, type 2 (HCC)  Lab Results   Component Value Date    HGBA1C 8.2 (H) 12/30/2024       Recent Labs     02/03/25  1625 02/03/25  2157 02/04/25  0734 02/04/25  1059   POCGLU 78 83 122 93       Blood Sugar Average: Last 72 hrs:  (P) 115.2246149786616182  On Farxiga, glipizide,  Humalog 75/25 60-58 units twice daily at home.  Continue NovoLog 70/30 50 units SQ twice daily before meals  SSI ACHS  Diabetic diet  Recent A1c 8.2  HTN (hypertension)  BP acceptable  Continue losartan, metoprolol, torsemide  Monitor vitals per routine  Hyperlipidemia  Continue statin  Status post left foot surgery  Continue surgical shoe when out of bed  Podiatry consultation appreciated. Follow up outpatient  CKD (chronic kidney disease)  Lab Results   Component Value Date    EGFR 48 02/04/2025    EGFR 49 02/03/2025    EGFR 43 02/02/2025    CREATININE 1.38 (H) 02/04/2025    CREATININE 1.36 (H) 02/03/2025    CREATININE 1.51 (H) 02/02/2025     Creatinine stable (baseline 1.4-1.8)  Avoid hypotension and nephrotoxic agents  Monitor vitals per routine    VTE Pharmacologic Prophylaxis: VTE Score: 5 High Risk (Score >/= 5) - Pharmacological DVT Prophylaxis Ordered: enoxaparin (Lovenox). Sequential Compression Devices Ordered.    Mobility:   Basic Mobility Inpatient Raw Score: 14  -HLM Goal: 4: Move to chair/commode  JH-HLM Achieved: 2: Bed activities/Dependent transfer  JH-HLM Goal NOT achieved. Continue with multidisciplinary rounding and encourage appropriate mobility to improve upon JH-HLM goals.    Patient Centered Rounds: I performed bedside rounds with nursing staff today.   Discussions with Specialists or Other Care Team Provider: Nursing, PACS    Education and Discussions with Family / Patient: Updated  (wife) via phone.    Current Length of Stay: 5 day(s)  Current Patient Status: Inpatient   Certification Statement: The patient will continue to require additional inpatient hospital stay due to acute low back pain, transfer for neurosurgery evaluation  Discharge Plan:  TBD pending transfer and bed availability     Code Status: Level 1 - Full Code    Subjective   Patient reports ongoing low back pain.  Difficulty with ambulation secondary to pain.  Reports that he is moving his bowels and  urinating fine, no incontinence.  Sensation intact.    Objective :  Temp:  [97.3 °F (36.3 °C)] 97.3 °F (36.3 °C)  HR:  [56-60] 60  BP: (125-176)/(64-79) 161/77  Resp:  [18] 18  SpO2:  [90 %-95 %] 95 %  O2 Device: None (Room air)    Body mass index is 35.28 kg/m².     Input and Output Summary (last 24 hours):     Intake/Output Summary (Last 24 hours) at 2/4/2025 1237  Last data filed at 2/4/2025 1101  Gross per 24 hour   Intake --   Output 320 ml   Net -320 ml       Physical Exam  Vitals and nursing note reviewed.   Constitutional:       General: He is not in acute distress.     Appearance: He is obese.   HENT:      Head: Normocephalic and atraumatic.   Eyes:      Conjunctiva/sclera: Conjunctivae normal.   Cardiovascular:      Rate and Rhythm: Normal rate.      Heart sounds: No murmur heard.  Pulmonary:      Effort: Pulmonary effort is normal. No respiratory distress.   Abdominal:      General: Bowel sounds are normal.      Palpations: Abdomen is soft.      Tenderness: There is no abdominal tenderness.   Musculoskeletal:         General: Tenderness (Lumbar spine) present.      Cervical back: Neck supple.      Comments: Decreased range of motion of bilateral lower extremities secondary to pain   Skin:     General: Skin is warm and dry.   Neurological:      General: No focal deficit present.      Mental Status: He is alert.           Lines/Drains:              Lab Results: I have reviewed the following results:   Results from last 7 days   Lab Units 02/03/25  0525   WBC Thousand/uL 9.51   HEMOGLOBIN g/dL 14.8   HEMATOCRIT % 45.1   PLATELETS Thousands/uL 183   SEGS PCT % 65   LYMPHO PCT % 24   MONO PCT % 8   EOS PCT % 2     Results from last 7 days   Lab Units 02/04/25  0541 01/31/25  0533 01/30/25  1743   SODIUM mmol/L 143   < > 141   POTASSIUM mmol/L 3.3*   < > 3.8   CHLORIDE mmol/L 111*   < > 107   CO2 mmol/L 18*   < > 21   BUN mg/dL 21   < > 29*   CREATININE mg/dL 1.38*   < > 1.76*   ANION GAP mmol/L 14*   < > 13    CALCIUM mg/dL 9.2   < > 10.3*   ALBUMIN g/dL  --   --  4.5   TOTAL BILIRUBIN mg/dL  --   --  0.49   ALK PHOS U/L  --   --  55   ALT U/L  --   --  24   AST U/L  --   --  23   GLUCOSE RANDOM mg/dL 84   < > 135    < > = values in this interval not displayed.         Results from last 7 days   Lab Units 02/04/25  1059 02/04/25  0734 02/03/25  2157 02/03/25  1625 02/03/25  1106 02/03/25  0802 02/03/25  0214 02/02/25  2013 02/02/25  1633 02/02/25  1121 02/02/25  0716 02/02/25  0220   POC GLUCOSE mg/dl 93 122 83 78 135 118 100 100 80 133 125 103               Recent Cultures (last 7 days):         Imaging Results Review: No pertinent imaging studies reviewed.  Other Study Results Review: No additional pertinent studies reviewed.    Last 24 Hours Medication List:     Current Facility-Administered Medications:     acetaminophen (TYLENOL) tablet 975 mg, Q8H JANELL    allopurinol (ZYLOPRIM) tablet 100 mg, BID    aspirin (ECOTRIN LOW STRENGTH) EC tablet 81 mg, Daily    cyclobenzaprine (FLEXERIL) tablet 10 mg, TID PRN    Empagliflozin (JARDIANCE) tablet 10 mg, Daily With Dinner    enoxaparin (LOVENOX) subcutaneous injection 30 mg, Daily    eplerenone (INSPRA) tablet 25 mg, Daily    fish oil capsule 1,000 mg, BID    gabapentin (NEURONTIN) capsule 300 mg, TID    glipiZIDE (GLUCOTROL) tablet 5 mg, QAM    HYDROmorphone HCl (DILAUDID) injection 0.2 mg, Q4H PRN    insulin aspart protamine-insulin aspart (NovoLOG 70/30) 100 units/mL subcutaneous injection 50 Units, BID AC    insulin lispro (HumALOG/ADMELOG) 100 units/mL subcutaneous injection 1-5 Units, TID AC **AND** Fingerstick Glucose (POCT), TID AC    insulin lispro (HumALOG/ADMELOG) 100 units/mL subcutaneous injection 1-5 Units, HS    lidocaine (LIDODERM) 5 % patch 1 patch, Daily    losartan (COZAAR) tablet 50 mg, Daily    metoprolol succinate (TOPROL-XL) 24 hr tablet 150 mg, HS    ondansetron (ZOFRAN) injection 4 mg, Q4H PRN    oxyCODONE (ROXICODONE) IR tablet 5 mg, Q6H PRN     oxyCODONE (ROXICODONE) split tablet 2.5 mg, Q6H PRN    pravastatin (PRAVACHOL) tablet 40 mg, Daily With Dinner    saccharomyces boulardii (FLORASTOR) capsule 250 mg, BID    torsemide (DEMADEX) tablet 20 mg, Daily **AND** torsemide (DEMADEX) tablet 10 mg, After Dinner    Administrative Statements   Today, Patient Was Seen By: Tessy Marquez PA-C  I have spent a total time of 30 minutes in caring for this patient on the day of the visit/encounter including Diagnostic results, Risks and benefits of tx options, Instructions for management, Patient and family education, Counseling / Coordination of care, Documenting in the medical record, Reviewing / ordering tests, medicine, procedures  , Obtaining or reviewing history  , and Communicating with other healthcare professionals .    **Please Note: This note may have been constructed using a voice recognition system.**

## 2025-02-04 NOTE — DISCHARGE SUMMARY
Discharge Summary - Hospitalist   Name: Stewart Flores 79 y.o. male I MRN: [id number]  Unit/Bed#: 2 [place] [address] A  Date of Admission: 1/30/2025   Date of Service: 2/4/2025 I Hospital Day: 5     Assessment & Plan  Acute low back pain with right-sided sciatica  Patient presented to the ED for acute onset of back pain x2 days. Radiation to the left leg. Difficulty ambulating.  No bowel or bladder dysfunction.    MRI of the lumbar spine: Multilevel degenerative discogenic disease, most notably at L4-L5 where there is a central and left central disc protrusion resulting in severe spinal stenosis and compression of the nerve roots of the cauda equina. Surgical consultation is recommended. Additional findings of degenerative discogenic disease at L5-S1 with endplate osteophytic ridging and facet arthropathy resulting in right greater than left foraminal stenosis and bilateral subarticular zone encroachment. Correlate for right greater than left L5 and bilateral S1 radiculitis.  Neurosurgery consultation appreciated.  Discussed with neurosurgery team today and they agreed on transfer since the patient is still complaining of a lot of pain.  Discussed with [facility] hospitalist at [facility] and the patient was accepted as transfer.    Multimodal pain control with Tylenol 975mg Q8H JANELL, Gabapentin increase to 300mg TID, Lidocaine patch, Oxycodone PRN for mod-severe pain, Flexeril PRN muscle spasms  PT/OT evaluation and treatment  Smoking - cigars daily  Declined nicotine patch  Gout  Continue allopurinol  Diabetes mellitus, type 2 (HCC)  Lab Results   Component Value Date    HGBA1C 8.2 (H) 12/30/2024       Recent Labs     02/03/25  1625 02/03/25  2157 02/04/25  0734 02/04/25  1059   POCGLU 78 83 122 93       Blood Sugar Average: Last 72 hrs:  (P) 115.2695321892189240  On Farxiga, glipizide, Humalog 75/25 60-58 units twice daily at home.  Continue NovoLog 70/30 50 units SQ twice daily before meals  SSI ACHS  Diabetic  diet  Recent A1c 8.2  HTN (hypertension)  BP acceptable  Continue losartan, metoprolol, torsemide  Monitor vitals per routine  Hyperlipidemia  Continue statin  Status post left foot surgery  Continue surgical shoe when out of bed  Podiatry consultation appreciated. Follow up outpatient  CKD (chronic kidney disease)  Lab Results   Component Value Date    EGFR 48 02/04/2025    EGFR 49 02/03/2025    EGFR 43 02/02/2025    CREATININE 1.38 (H) 02/04/2025    CREATININE 1.36 (H) 02/03/2025    CREATININE 1.51 (H) 02/02/2025     Creatinine stable (baseline 1.4-1.8)  Avoid hypotension and nephrotoxic agents  Monitor vitals per routine     Medical Problems       Resolved Problems  Date Reviewed: 7/30/2018          Resolved    Leukocytosis 2/3/2025     Resolved by  Tessy Marquez PA-C        Discharging Physician / Practitioner: Tessy Marquez PA-C  PCP: Brady Reilly PA-C  Admission Date:   Admission Orders (From admission, onward)       Ordered        01/30/25 1841  INPATIENT ADMISSION  Once                          Discharge Date: 02/04/25    Consultations During Hospital Stay:  Podiatry  Neurosurgery     Procedures Performed:   None    Significant Findings / Test Results:   XR tibia/fibula (1/30/25): No acute osseous abnormality.  XR left foot (1/30/25): No acute osseous abnormality.   CT lumbar spine (1/30/25): Chronic disc and facet degenerative change resulting in nerve root encroachment in the lower lumbar spine. L4-5 left paracentral to foraminal zone disc protrusion encroaching on the traversing left L5 nerve root. MRI may be helpful for further evaluation.  MRI lumbar spine (1/31/25): Multilevel degenerative discogenic disease, most notably at L4-L5 where there is a central and left central disc protrusion resulting in severe spinal stenosis and compression of the nerve roots of the cauda equina. Surgical consultation is recommended. Additional findings of degenerative discogenic disease at L5-S1 with endplate  osteophytic ridging and facet arthropathy resulting in right greater than left foraminal stenosis and bilateral subarticular zone encroachment. Correlate for right greater than left L5 and bilateral S1 radiculitis.    Incidental Findings:   None     Test Results Pending at Discharge (will require follow up):   None     Outpatient Tests Requested:  None    Complications:  Transfer to [facility]    Reason for Admission: Acute low back pain    Hospital Course:   Stewart Flores is a 79 y.o. male patient who originally presented to the hospital on 1/30/2025 due to the onset of low back pain that radiated down the left leg with associated ambulatory dysfunction.  Patient with CT evidence of disc and facet degenerative changes resulting in nerve root encroachment of lumbar spine at L4-L5.  Patient was admitted for MRI of the lumbar spine and multimodal pain control.  MRI spine showing disc protrusion resulting in severe spinal stenosis and compression of the nerve roots of the cauda equina.  Neurosurgery consulted and recommending transfer to [facility] for formal evaluation and possible surgical intervention.  Transfer process initiated.  All patient and family questions answered to the best of my ability.    Please see above list of diagnoses and related plan for additional information.     Condition at Discharge: fair    Discharge Day Visit / Exam:   * Please refer to separate progress note for these details *    Discussion with Family: Updated  (wife) via phone.    Discharge instructions/Information to patient and family:   See after visit summary for information provided to patient and family.      Provisions for Follow-Up Care:  See after visit summary for information related to follow-up care and any pertinent home health orders.      Mobility at time of Discharge:   Basic Mobility Inpatient Raw Score: 14  -[facility] Goal: 4: Move to chair/commode  -HLM Achieved: 2: Bed  activities/Dependent transfer  HLM Goal NOT achieved. Continue to encourage mobility in post discharge setting.     Disposition:   Acute Care Hospital Transfer to [facility]    Planned Readmission: Yes    Discharge Medications:  See after visit summary for reconciled discharge medications provided to patient and/or family.      Administrative Statements   Discharge Statement:  I have spent a total time of 50 minutes in caring for this patient on the day of the visit/encounter. >30 minutes of time was spent on: Diagnostic results, Risks and benefits of tx options, Instructions for management, Patient and family education, Counseling / Coordination of care, Documenting in the medical record, Reviewing / ordering tests, medicine, procedures  , and Communicating with other healthcare professionals .    **Please Note: This note may have been constructed using a voice recognition system**

## 2025-02-04 NOTE — EMTALA/ACUTE CARE TRANSFER
[address] [address]  Dept: [phone]      ACUTE CARE TRANSFER CONSENT    NAME Stewart Flores                                         1945                              MRN [id number]    I have been informed of my rights regarding examination, treatment, and transfer   by Dr. Howard Mancera MD    Benefits:      Risks:        Consent for Transfer:  I acknowledge that my medical condition has been evaluated and explained to me by the treating physician or other qualified medical person and/or my attending physician, who has recommended that I be transferred to the service of    at  . The above potential benefits of such transfer, the potential risks associated with such transfer, and the probable risks of not being transferred have been explained to me, and I fully understand them.  The doctor has explained that, in my case, the benefits of transfer outweigh the risks.  I agree to be transferred.    I authorize the performance of emergency medical procedures and treatments upon me in both transit and upon arrival at the receiving facility.  Additionally, I authorize the release of any and all medical records to the receiving facility and request they be transported with me, if possible.  I understand that the safest mode of transportation during a medical emergency is an ambulance and that the Hospital advocates the use of this mode of transport. Risks of traveling to the receiving facility by car, including absence of medical control, life sustaining equipment, such as oxygen, and medical personnel has been explained to me and I fully understand them.    (REENA CORRECT BOX BELOW)  [  ]  I consent to the stated transfer and to be transported by ambulance/helicopter.  [  ]  I consent to the stated transfer, but refuse transportation by ambulance and accept full responsibility for my transportation by car.  I understand the risks of non-ambulance  transfers and I exonerate the Hospital and its staff from any deterioration in my condition that results from this refusal.    X___________________________________________    DATE  25  TIME________  Signature of patient or legally responsible individual signing on patient behalf           RELATIONSHIP TO PATIENT_________________________          Provider Certification    NAME Stewart Flores                                         1945                              MRN [id number]    A medical screening exam was performed on the above named patient.  Based on the examination:    Condition Necessitating Transfer Acute low back pain    Patient Condition:   Stable    Reason for Transfer:   Neurosurgery evaluation    Transfer Requirements: Facility   [facility]  Space available and qualified personnel available for treatment as acknowledged by    Agreed to accept transfer and to provide appropriate medical treatment as acknowledged by          Appropriate medical records of the examination and treatment of the patient are provided at the time of transfer   STAFF INITIAL WHEN COMPLETED _______  Transfer will be performed by qualified personnel from    and appropriate transfer equipment as required, including the use of necessary and appropriate life support measures.    Provider Certification: I have examined the patient and explained the following risks and benefits of being transferred/refusing transfer to the patient/family:         Based on these reasonable risks and benefits to the patient and/or the unborn child(haim), and based upon the information available at the time of the patient’s examination, I certify that the medical benefits reasonably to be expected from the provision of appropriate medical treatments at another medical facility outweigh the increasing risks, if any, to the individual’s medical condition, and in the case of labor to the unborn child, from effecting the  transfer.    X____________________________________________ DATE 02/04/25        TIME_______      ORIGINAL - SEND TO MEDICAL RECORDS   COPY - SEND WITH PATIENT DURING TRANSFER

## 2025-02-04 NOTE — ASSESSMENT & PLAN NOTE
Lab Results   Component Value Date    EGFR 48 02/04/2025    EGFR 49 02/03/2025    EGFR 43 02/02/2025    CREATININE 1.38 (H) 02/04/2025    CREATININE 1.36 (H) 02/03/2025    CREATININE 1.51 (H) 02/02/2025     Creatinine stable (baseline 1.4-1.8)  Avoid hypotension and nephrotoxic agents  Monitor vitals per routine

## 2025-02-04 NOTE — ASSESSMENT & PLAN NOTE
Patient presented to the ED for acute onset of back pain x2 days. Radiation to the left leg. Difficulty ambulating.  No bowel or bladder dysfunction.    MRI of the lumbar spine: Multilevel degenerative discogenic disease, most notably at L4-L5 where there is a central and left central disc protrusion resulting in severe spinal stenosis and compression of the nerve roots of the cauda equina. Surgical consultation is recommended. Additional findings of degenerative discogenic disease at L5-S1 with endplate osteophytic ridging and facet arthropathy resulting in right greater than left foraminal stenosis and bilateral subarticular zone encroachment. Correlate for right greater than left L5 and bilateral S1 radiculitis.  Neurosurgery consultation appreciated.  Discussed with neurosurgery team today and they agreed on transfer since the patient is still complaining of a lot of pain.  Discussed with SL hospitalist at [facility] and the patient was accepted as transfer.    Multimodal pain control with Tylenol 975mg Q8H JANELL, Gabapentin increase to 300mg TID, Lidocaine patch, Oxycodone PRN for mod-severe pain, Flexeril PRN muscle spasms  PT/OT evaluation and treatment

## 2025-02-04 NOTE — ASSESSMENT & PLAN NOTE
Patient presented to the ED for acute onset of back pain x2 days. Radiation to the left leg. Difficulty ambulating.  No bowel or bladder dysfunction.    MRI of the lumbar spine: Multilevel degenerative discogenic disease, most notably at L4-L5 where there is a central and left central disc protrusion resulting in severe spinal stenosis and compression of the nerve roots of the cauda equina. Surgical consultation is recommended. Additional findings of degenerative discogenic disease at L5-S1 with endplate osteophytic ridging and facet arthropathy resulting in right greater than left foraminal stenosis and bilateral subarticular zone encroachment. Correlate for right greater than left L5 and bilateral S1 radiculitis.  Neurosurgery consultation appreciated.  Discussed with neurosurgery team today and they agreed on transfer since the patient is still complaining of a lot of pain.  Discussed with SLIM hospitalist at [facility] and the patient was accepted as transfer.  May take a couple days for transfer to happen due to the bed availability.  Patient would rather go to [facility] instead of [facility]  Multimodal pain control with Tylenol 975mg Q8H JANELL, Gabapentin increase to 300mg TID, Lidocaine patch, Oxycodone PRN for mod-severe pain, Flexeril PRN muscle spasms  PT/OT evaluation and treatment

## 2025-02-04 NOTE — NURSING NOTE
Patient left with all belongings. Report called to Shi ROLLINS at [facility]. All questions answered. Patient left in stable condition with all belongings in stretcher accompanied by transport team.

## 2025-02-05 LAB
FERRITIN SERPL-MCNC: 153 NG/ML (ref 24–336)
FOLATE SERPL-MCNC: >22.3 NG/ML
GLUCOSE SERPL-MCNC: 105 MG/DL (ref 65–140)
GLUCOSE SERPL-MCNC: 128 MG/DL (ref 65–140)
GLUCOSE SERPL-MCNC: 129 MG/DL (ref 65–140)
GLUCOSE SERPL-MCNC: 142 MG/DL (ref 65–140)
GLUCOSE SERPL-MCNC: 146 MG/DL (ref 65–140)
IRON SATN MFR SERPL: 17 % (ref 15–50)
IRON SERPL-MCNC: 51 UG/DL (ref 50–212)
TIBC SERPL-MCNC: 303.8 UG/DL (ref 250–450)
TRANSFERRIN SERPL-MCNC: 217 MG/DL (ref 203–362)
UIBC SERPL-MCNC: 253 UG/DL (ref 155–355)
VIT B12 SERPL-MCNC: 522 PG/ML (ref 180–914)

## 2025-02-05 PROCEDURE — 97163 PT EVAL HIGH COMPLEX 45 MIN: CPT

## 2025-02-05 PROCEDURE — 99222 1ST HOSP IP/OBS MODERATE 55: CPT | Performed by: PHYSICIAN ASSISTANT

## 2025-02-05 PROCEDURE — 99232 SBSQ HOSP IP/OBS MODERATE 35: CPT | Performed by: STUDENT IN AN ORGANIZED HEALTH CARE EDUCATION/TRAINING PROGRAM

## 2025-02-05 PROCEDURE — 82948 REAGENT STRIP/BLOOD GLUCOSE: CPT

## 2025-02-05 PROCEDURE — 82607 VITAMIN B-12: CPT | Performed by: STUDENT IN AN ORGANIZED HEALTH CARE EDUCATION/TRAINING PROGRAM

## 2025-02-05 RX ORDER — OXYCODONE HYDROCHLORIDE 5 MG/1
5 TABLET ORAL EVERY 6 HOURS PRN
Refills: 0 | Status: DISCONTINUED | OUTPATIENT
Start: 2025-02-05 | End: 2025-02-07 | Stop reason: HOSPADM

## 2025-02-05 RX ORDER — METHOCARBAMOL 750 MG/1
750 TABLET, FILM COATED ORAL 3 TIMES DAILY
Status: DISCONTINUED | OUTPATIENT
Start: 2025-02-05 | End: 2025-02-07 | Stop reason: HOSPADM

## 2025-02-05 RX ORDER — HYDROMORPHONE HYDROCHLORIDE 4 MG/1
4 TABLET ORAL EVERY 4 HOURS PRN
Refills: 0 | Status: DISCONTINUED | OUTPATIENT
Start: 2025-02-05 | End: 2025-02-06

## 2025-02-05 RX ADMIN — OXYCODONE HYDROCHLORIDE 5 MG: 5 TABLET ORAL at 09:26

## 2025-02-05 RX ADMIN — METOPROLOL SUCCINATE 150 MG: 100 TABLET, EXTENDED RELEASE ORAL at 21:29

## 2025-02-05 RX ADMIN — LOSARTAN POTASSIUM 50 MG: 50 TABLET, FILM COATED ORAL at 09:26

## 2025-02-05 RX ADMIN — PRAVASTATIN SODIUM 40 MG: 40 TABLET ORAL at 16:05

## 2025-02-05 RX ADMIN — ALLOPURINOL 100 MG: 100 TABLET ORAL at 17:11

## 2025-02-05 RX ADMIN — HYDROMORPHONE HYDROCHLORIDE 4 MG: 4 TABLET ORAL at 16:04

## 2025-02-05 RX ADMIN — GABAPENTIN 300 MG: 300 CAPSULE ORAL at 16:04

## 2025-02-05 RX ADMIN — LIDOCAINE 5% 1 PATCH: 700 PATCH TOPICAL at 09:27

## 2025-02-05 RX ADMIN — INSULIN ASPART 50 UNITS: 100 INJECTION, SUSPENSION SUBCUTANEOUS at 09:29

## 2025-02-05 RX ADMIN — ACETAMINOPHEN 975 MG: 325 TABLET, FILM COATED ORAL at 06:51

## 2025-02-05 RX ADMIN — EPLERENONE 25 MG: 25 TABLET, FILM COATED ORAL at 10:34

## 2025-02-05 RX ADMIN — ENOXAPARIN SODIUM 40 MG: 40 INJECTION SUBCUTANEOUS at 09:28

## 2025-02-05 RX ADMIN — SODIUM BICARBONATE 100 ML/HR: 84 INJECTION, SOLUTION INTRAVENOUS at 14:09

## 2025-02-05 RX ADMIN — METHOCARBAMOL TABLETS 750 MG: 750 TABLET, COATED ORAL at 16:04

## 2025-02-05 RX ADMIN — OMEGA-3 FATTY ACIDS CAP 1000 MG 1000 MG: 1000 CAP at 17:10

## 2025-02-05 RX ADMIN — OMEGA-3 FATTY ACIDS CAP 1000 MG 1000 MG: 1000 CAP at 09:26

## 2025-02-05 RX ADMIN — ASPIRIN 81 MG: 81 TABLET ORAL at 09:26

## 2025-02-05 RX ADMIN — GABAPENTIN 300 MG: 300 CAPSULE ORAL at 21:29

## 2025-02-05 RX ADMIN — Medication 250 MG: at 17:10

## 2025-02-05 RX ADMIN — ALLOPURINOL 100 MG: 100 TABLET ORAL at 09:26

## 2025-02-05 RX ADMIN — METHOCARBAMOL TABLETS 750 MG: 750 TABLET, COATED ORAL at 21:29

## 2025-02-05 RX ADMIN — ACETAMINOPHEN 975 MG: 325 TABLET, FILM COATED ORAL at 21:26

## 2025-02-05 RX ADMIN — EMPAGLIFLOZIN 10 MG: 10 TABLET, FILM COATED ORAL at 16:05

## 2025-02-05 RX ADMIN — GABAPENTIN 300 MG: 300 CAPSULE ORAL at 09:25

## 2025-02-05 RX ADMIN — Medication 250 MG: at 09:26

## 2025-02-05 NOTE — ASSESSMENT & PLAN NOTE
Lab Results   Component Value Date    HGBA1C 8.2 (H) 12/30/2024       Recent Labs     02/04/25  0734 02/04/25  1059 02/04/25  1617 02/04/25  2150   POCGLU 122 93 101 97   Mx per primary team  A1c>8.4%    Blood Sugar Average: Last 72 hrs:

## 2025-02-05 NOTE — PHYSICAL THERAPY NOTE
Physical Therapy Cancellation Note       02/05/25 0746   Note Type   Note type Cancelled Session   Cancel Reasons Other   Additional Comments referral received for PT eval and tx. pt is pending neurosurgery consult. Gregorio MALDONADO confirmed that the pt was not yet seen. will await consult completion and perform eval as appropriate. notified Irasema NIÑO of cancellation.     Jose Rolle, PT

## 2025-02-05 NOTE — ASSESSMENT & PLAN NOTE
Patient presented with Acute on chronic Lower back pain with radiation down to the legs.  Patient noticed  acute on chronic worsening pain in his  lower back about 1-2 weeks ago . Has associated bilateral posterior Thighs and knees radicular symptoms, occasional paresthesia, and weakness.  Reports ambulatory dysfunction with difficulty walking due to severe pain in the legs.  He tried PT and multiple injections in the past.    Imagings:     Lumbar spine MRI shows Multilevel degenerative discogenic disease, most notably at L4-L5 where there is a central and left central disc protrusion resulting in severe spinal stenosis and compression of the nerve roots of the cauda equina. Surgical consultation is recommended. Also additional DJD discogenic disease at L5-S1 with endplate osteophytic ridging and facet arthropathy resulting in right greater than left foraminal stenosis and bilateral subarticular zone encroachment. Correlate for right greater than left L5 and bilateral S1 radiculitis.          Plan:  Continue neuromonitoring, assess for red flags  Pain control: Recommend multimodal pain meds  DVT ppx: SCDs bilat legs, okay with DVT ppx  Activity: As tolerated  PT/OT evaluation & treatment  Brace: None  Medical Mx: Per primary team  Patient presented with Acute on chronic Lumbar radiculopathy/neurogenic claudication, tried multiple JANETTE and PT in the past, but no recent pain Mx, strength in the legs appears normal, except some pain inhibition weakness in the quads bilat. Patient's A1c=8.4, Patient with hx of left medi foot diabetic foot wound, s/p debridement by Podiatry in October 2024, but no Abx treatment, as there is no deep collection and currently Left foot is covered/wrapped with ACE. Given his high Hgb A1c>7%, and ongoing left foot wound, I don't think spine surgery is best time to consider. I recommend continue conservative measures with multimodal pain meds,  gabapentin, muscle relaxer, steroids.  Continue  physical therapy when pain is controlled and patient is able to perform. Will review the images with attendings if there is any different opinion. Consider OP follow up. Call with questions or concerns.

## 2025-02-05 NOTE — PLAN OF CARE
Problem: PHYSICAL THERAPY ADULT  Goal: Performs mobility at highest level of function for planned discharge setting.  See evaluation for individualized goals.  Description: Treatment/Interventions: Functional transfer training, LE strengthening/ROM, Elevations, Therapeutic exercise, Endurance training, Patient/family training, Equipment eval/education, Bed mobility, Gait training, Compensatory technique education          See flowsheet documentation for full assessment, interventions and recommendations.  Note:    Problem List: Decreased strength, Decreased range of motion, Impaired balance, Decreased mobility, Pain, Obesity, Decreased endurance, Decreased safety awareness, Impaired sensation  Assessment: Pt presents as a transfer from [facility] for an for neurosurgery evaluation. He presented then with acute low back pain radiating to both his lower extremities after his recent left foot surgery. Dx: acute on chronic low back pain with bilateral sciatica, HTN, CKD, and s/p left foot surgery. order placed for PT eval and tx, w/ activity order of ambulate patient. Pt is WBAT L LE in surgical shoe. pt presents w/ comorbidities of CKD, arthritis, DM, HTN, gout, left knee arthroscopy, polyneuropathy, and left foot Charcot and personal factors of advanced age and stair(s) to enter home. pt presents w/ pain, weakness, decreased ROM, decreased endurance, impaired balance, altered sensation, decreased safety awareness, orthopedic restrictions, fall risk, and impaired skin integrity. these impairments are evident in findings from physical examination (weakness, decreased ROM, and altered sensation), mobility assessment (need for mod to max assist w/ bed mobility and transfers, inability to mobilize to bedside chair or ambulate, need for input for mobility technique/safety), and Barthel Index: 35/100. pt needed input for task focus and mobility technique/safety. pt is at risk for falls due to physical and safety awareness  deficits. pt's clinical presentation is unstable/unpredictable (evident in need for assist w/ all phases of mobility when usually mobilizing independently, pain impacting overall mobility status, and need for input for task focus and mobility technique). pt needs inpatient PT tx to improve mobility deficits and progress mobility training as appropriate.        Rehab Resource Intensity Level, PT: II (Moderate Resource Intensity)    See flowsheet documentation for full assessment.

## 2025-02-05 NOTE — CONSULTS
Consultation - Neurosurgery   Name: Stewart Flores 79 y.o. male I MRN: [id number]  Unit/Bed#: S -01 I Date of Admission: 2/4/2025   Date of Service: 2/5/2025 I Hospital Day: 1   Inpatient consult to Neurosurgery  Consult performed by: Gregorio Cummins PA-C  Consult ordered by: Elijah Gandhi MD      Physician Requesting Evaluation: Sridevi Bustillo DO   Reason for Evaluation / Principal Problem: lower back pain with bilat LE radiation    Assessment & Plan  Acute on chronic low back pain with bilateral sciatica  Patient presented with Acute on chronic Lower back pain with radiation down to the legs.  Patient noticed  acute on chronic worsening pain in his  lower back about 1-2 weeks ago . Has associated bilateral posterior Thighs and knees radicular symptoms, occasional paresthesia, and weakness.  Reports ambulatory dysfunction with difficulty walking due to severe pain in the legs.  He tried PT and multiple injections in the past.    Imagings:     Lumbar spine MRI shows Multilevel degenerative discogenic disease, most notably at L4-L5 where there is a central and left central disc protrusion resulting in severe spinal stenosis and compression of the nerve roots of the cauda equina. Surgical consultation is recommended. Also additional DJD discogenic disease at L5-S1 with endplate osteophytic ridging and facet arthropathy resulting in right greater than left foraminal stenosis and bilateral subarticular zone encroachment. Correlate for right greater than left L5 and bilateral S1 radiculitis.          Plan:  Continue neuromonitoring, assess for red flags  Pain control: Recommend multimodal pain meds  DVT ppx: SCDs bilat legs, okay with DVT ppx  Activity: As tolerated  PT/OT evaluation & treatment  Brace: None  Medical Mx: Per primary team  Patient presented with Acute on chronic Lumbar radiculopathy/neurogenic claudication, tried multiple JANETTE and PT in the past, but no recent pain Mx, strength in the legs  appears normal, except some pain inhibition weakness in the quads bilat. Patient's A1c=8.4, Patient with hx of left medi foot diabetic foot wound, s/p debridement by Podiatry in October 2024, but no Abx treatment, as there is no deep collection and currently Left foot is covered/wrapped with ACE. Given his high Hgb A1c>7%, and ongoing left foot wound, I don't think spine surgery is best time to consider. I recommend continue conservative measures with multimodal pain meds,  gabapentin, muscle relaxer, steroids.  Continue physical therapy when pain is controlled and patient is able to perform. Will review the images with attendings if there is any different opinion. Consider OP follow up. Call with questions or concerns.    Diabetes mellitus, type 2 (HCC)  Lab Results   Component Value Date    HGBA1C 8.2 (H) 12/30/2024       Recent Labs     02/04/25  0734 02/04/25  1059 02/04/25  1617 02/04/25  2150   POCGLU 122 93 101 97   Mx per primary team  A1c>8.4%    Blood Sugar Average: Last 72 hrs:      CKD (chronic kidney disease)  Lab Results   Component Value Date    EGFR 48 02/04/2025    EGFR 49 02/03/2025    EGFR 43 02/02/2025    CREATININE 1.38 (H) 02/04/2025    CREATININE 1.36 (H) 02/03/2025    CREATININE 1.51 (H) 02/02/2025     Mx per primary team    History of Present Illness   HPI: Stewart Flores is a 79 y.o. year old male who presents with PMHx DM-2, HTN, CKD, S/P left mid foot diabetic wound debridement and chronic Lower back pain with bilateral LE radiculopathy/Neurogenic claudication. Patient reports he had been getting JANETTE multiple times in the past, including PT, but his symptoms worsens over time. He said his pain became worse about 1-2 weeks ago when he tries to get out of bed. Difficulty ambulating due to pain in both legs equally. He noticed occasional associated paresthesia and numbness & some weakness in the legs. He denies bowel/bladder dysfunction or saddle anesthesia.  Has severe gait issues with  limping and due to difficulty walking.  Patient denies any previous surgery or trauma to the back.  She denies any fever, chills, rigors, cough or chest pain.    Patient denies history of congestive heart failure, stroke, seizures, bleeding disorder or taking anticoagulant medications.  He denies smoking cigarettes or drinking alcohol.    Review of Systems   Constitutional:  Negative for chills.   HENT:  Negative for trouble swallowing and voice change.    Eyes:  Negative for photophobia and visual disturbance.   Gastrointestinal:  Negative for nausea and vomiting.   Genitourinary:  Negative for difficulty urinating.   Musculoskeletal:  Positive for back pain and gait problem. Negative for neck stiffness.   Neurological:  Positive for weakness. Negative for dizziness, tremors, seizures, syncope, facial asymmetry, speech difficulty, light-headedness, numbness and headaches.   Psychiatric/Behavioral:  Negative for confusion.      Objective :  Temp:  [97 °F (36.1 °C)-97.7 °F (36.5 °C)] 97.7 °F (36.5 °C)  HR:  [59-64] 64  BP: (136-161)/(64-90) 155/79  Resp:  [16-18] 16  SpO2:  [94 %-95 %] 94 %  O2 Device: None (Room air)    Physical Exam  Eyes:      Extraocular Movements: Extraocular movements intact.      Pupils: Pupils are equal, round, and reactive to light.   Pulmonary:      Effort: Pulmonary effort is normal.   Musculoskeletal:         General: Tenderness present.      Cervical back: Normal range of motion.   Neurological:      Mental Status: He is oriented to person, place, and time.      Sensory: No sensory deficit.      Motor: Weakness present.      Deep Tendon Reflexes:      Reflex Scores:       Tricep reflexes are 2+ on the right side and 2+ on the left side.       Bicep reflexes are 2+ on the right side and 2+ on the left side.       Brachioradialis reflexes are 2+ on the right side and 2+ on the left side.       Patellar reflexes are 2+ on the right side and 2+ on the left side.       Achilles reflexes are  2+ on the right side and 2+ on the left side.  Psychiatric:         Speech: Speech normal.    Neurological Exam  Mental Status  Awake, alert and oriented to person, place and time. Oriented to person, place, time and situation. Oriented to person, place, and time. Recent and remote memory are intact. Speech is normal. Language is fluent with no aphasia.    Cranial Nerves  CN III, IV, VI: Extraocular movements intact bilaterally. Pupils equal round and reactive to light bilaterally.    Motor  Normal muscle bulk throughout. No fasciculations present. Normal muscle tone. No abnormal involuntary movements. Strength is 5/5 in all four extremities except as noted.  Patient exhibits some quads weakness probably due to pain inhibition effect.    Sensory  Light touch is normal in upper and lower extremities.     Reflexes                                            Right                      Left  Brachioradialis                    2+                         2+  Biceps                                 2+                         2+  Triceps                                2+                         2+  Patellar                                2+                         2+  Achilles                                2+                         2+    Right pathological reflexes: Ivet's absent. Ankle clonus absent.  Left pathological reflexes: Ivet's absent. Ankle clonus absent.      Lab Results: I have reviewed the following results:  Recent Labs     02/03/25  0525 02/04/25  0541   WBC 9.51  --    HGB 14.8  --    HCT 45.1  --      --    SODIUM 141 143   K 3.7 3.3*   * 111*   CO2 17* 18*   BUN 21 21   CREATININE 1.36* 1.38*   GLUC 104 84       Imaging Results Review: I personally reviewed the following image studies in PACS and associated radiology reports: MRI spine. My interpretation of the radiology images/reports is: I reviewed image with the patient and findings as described in the assessment section  above.  Other Study Results Review: No additional pertinent studies reviewed.    VTE Pharmacologic Prophylaxis: Sequential compression device (Venodyne)     Administrative Statements   I have spent a total time of 30 minutes in caring for this patient on the day of the visit/encounter including Diagnostic results, Prognosis, Risks and benefits of tx options, Instructions for management, Patient and family education, Importance of tx compliance, Risk factor reductions, Impressions, Documenting in the medical record, Reviewing / ordering tests, medicine, procedures  , and Obtaining or reviewing history  .

## 2025-02-05 NOTE — ASSESSMENT & PLAN NOTE
Lab Results   Component Value Date    HGBA1C 8.2 (H) 12/30/2024       Recent Labs     02/03/25  2157 02/04/25  0734 02/04/25  1059 02/04/25  1617   POCGLU 83 122 93 101       Blood Sugar Average: Last 72 hrs:    Continue insulin NovoLog 70/30 plus sliding scale.  Patient also on Jardiance.  Will hold off glipizide for now.

## 2025-02-05 NOTE — ASSESSMENT & PLAN NOTE
Lab Results   Component Value Date    HGBA1C 8.2 (H) 12/30/2024       Recent Labs     02/04/25  2150 02/05/25  0723 02/05/25  0918 02/05/25  1144   POCGLU 97 128 142* 129       Blood Sugar Average: Last 72 hrs:  (P) 133  Continue insulin NovoLog 70/30 plus sliding scale.  Patient also on Jardiance.  Will hold off glipizide for now.

## 2025-02-05 NOTE — UTILIZATION REVIEW
NOTIFICATION OF ADMISSION DISCHARGE   This is a Notification of Discharge from [facility]. Please be advised that this patient has been discharge from our facility. Below you will find the admission and discharge date and time including the patient’s disposition.   UTILIZATION REVIEW CONTACT:  Radha Benito  Utilization   Network Utilization Review Department  Phone: [phone] x carefully listen to the prompts. All voicemails are confidential.  Email: [email or web address]     ADMISSION INFORMATION  PRESENTATION DATE: 1/30/2025  2:40 PM  OBERVATION ADMISSION DATE: N/A  INPATIENT ADMISSION DATE: 1/30/25  6:41 PM   DISCHARGE DATE: 2/4/2025  5:32 PM   DISPOSITION:[facility]    Network Utilization Review Department  ATTENTION: Please call with any questions or concerns to [phone] and carefully listen to the prompts so that you are directed to the right person. All voicemails are confidential.   For Discharge needs, contact Care Management DC Support Team at [phone] opt. 2  Send all requests for admission clinical reviews, approved or denied determinations and any other requests to dedicated fax number below belonging to the campus where the patient is receiving treatment. List of dedicated fax numbers for the Facilities:  FACILITY NAME UR FAX NUMBER   ADMISSION DENIALS (Administrative/Medical Necessity) [phone]   DISCHARGE SUPPORT TEAM ([facility]) [phone]   PARENT CHILD HEALTH (Maternity/NICU/Pediatrics) [phone]   [facility] [phone]   [facility] [phone]   [facility] [phone]   [facility] [phone]   [facility] [phone]   [facility] [phone]   [facility] [phone]   [facility]  [phone]   [facility] [phone]   [facility] [phone]   [facility] [phone]   [facility] [phone]

## 2025-02-05 NOTE — PROGRESS NOTES
Progress Note - Hospitalist   Name: Stewart Flores 79 y.o. male I MRN: [id number]  Unit/Bed#: S -01 I Date of Admission: 2/4/2025   Date of Service: 2/5/2025 I Hospital Day: 1    Assessment & Plan  Acute on chronic low back pain with bilateral sciatica  Worsening pain over the last 2 days with pain radiating back both lower extremities.  Significant weakness in lower extremities because of pain.  Difficulty ambulating.  No bowel or bladder dysfunction.  MRI done at [place] showed disc protrusion at L4-5 causing severe spinal stenosis and compression of the nerve roots of cauda equina.  So patient transferred here for neurosurgery evaluation.  Continue with multimodal pain regimen with Tylenol, gabapentin, lidocaine patch, as needed oxycodone  Change robaxin to 750 mg tid  PT OT eval  Neurosurgery consulted  Diabetes mellitus, type 2 (HCC)  Lab Results   Component Value Date    HGBA1C 8.2 (H) 12/30/2024       Recent Labs     02/04/25  2150 02/05/25  0723 02/05/25  0918 02/05/25  1144   POCGLU 97 128 142* 129       Blood Sugar Average: Last 72 hrs:  (P) 133  Continue insulin NovoLog 70/30 plus sliding scale.  Patient also on Jardiance.  Will hold off glipizide for now.  HTN (hypertension)  Pressure stable.  Continue losartan, metoprolol, eplerenone  Status post left foot surgery  Continue surgical shoe when out of bed.  Was eval by podiatry At [facility]  CKD (chronic kidney disease)  Lab Results   Component Value Date    EGFR 48 02/04/2025    EGFR 49 02/03/2025    EGFR 43 02/02/2025    CREATININE 1.38 (H) 02/04/2025    CREATININE 1.36 (H) 02/03/2025    CREATININE 1.51 (H) 02/02/2025     Baseline creatinine 1.4-1.8.  Creatinine currently at baseline.    VTE Pharmacologic Prophylaxis:   High Risk (Score >/= 5) - Pharmacological DVT Prophylaxis Ordered: enoxaparin (Lovenox). Sequential Compression Devices Ordered.    Mobility:   Basic Mobility Inpatient Raw Score: 18  -[facility] Goal: 6: Walk 10 steps or  more  JH-HLM Achieved: 2: Bed activities/Dependent transfer  JH-HLM Goal NOT achieved. Continue with multidisciplinary rounding and encourage appropriate mobility to improve upon JH-HLM goals.    Patient Centered Rounds: I performed bedside rounds with nursing staff today.   Discussions with Specialists or Other Care Team Provider: case management    Education and Discussions with Family / Patient: Updated  (wife and daughter in law) at bedside.    Current Length of Stay: 1 day(s)  Current Patient Status: Inpatient   Certification Statement: The patient will continue to require additional inpatient hospital stay due to pain control, pending neurosurgery evaluation  Discharge Plan: Anticipate discharge in 48-72 hrs to discharge location to be determined pending rehab evaluations.    Code Status: Level 1 - Full Code    Subjective   Pt seen and examined at bedside he continues to have leg pain making it difficult to ambulate    Objective :  Temp:  [97.5 °F (36.4 °C)-97.7 °F (36.5 °C)] 97.6 °F (36.4 °C)  HR:  [59-64] 62  BP: (136-162)/(64-90) 139/65  Resp:  [16-20] 20  SpO2:  [92 %-97 %] 97 %  O2 Device: None (Room air)    There is no height or weight on file to calculate BMI.     Input and Output Summary (last 24 hours):     Intake/Output Summary (Last 24 hours) at 2/5/2025 1517  Last data filed at 2/5/2025 1352  Gross per 24 hour   Intake 700 ml   Output 475 ml   Net 225 ml       Physical Exam  Vitals and nursing note reviewed.   Constitutional:       General: He is not in acute distress.     Appearance: He is well-developed.   HENT:      Head: Normocephalic and atraumatic.   Eyes:      Conjunctiva/sclera: Conjunctivae normal.   Cardiovascular:      Rate and Rhythm: Normal rate and regular rhythm.      Heart sounds: No murmur heard.  Pulmonary:      Effort: Pulmonary effort is normal. No respiratory distress.      Breath sounds: Normal breath sounds.   Abdominal:      Palpations: Abdomen is soft.       Tenderness: There is no abdominal tenderness.   Musculoskeletal:         General: No swelling.      Cervical back: Neck supple.   Skin:     General: Skin is warm and dry.   Neurological:      Mental Status: He is alert. Mental status is at baseline.   Psychiatric:         Mood and Affect: Mood normal.           Lines/Drains:              Lab Results: I have reviewed the following results:   Results from last 7 days   Lab Units 02/03/25  0525   WBC Thousand/uL 9.51   HEMOGLOBIN g/dL 14.8   HEMATOCRIT % 45.1   PLATELETS Thousands/uL 183   SEGS PCT % 65   LYMPHO PCT % 24   MONO PCT % 8   EOS PCT % 2     Results from last 7 days   Lab Units 02/04/25  0541 01/31/25  0533 01/30/25  1743   SODIUM mmol/L 143   < > 141   POTASSIUM mmol/L 3.3*   < > 3.8   CHLORIDE mmol/L 111*   < > 107   CO2 mmol/L 18*   < > 21   BUN mg/dL 21   < > 29*   CREATININE mg/dL 1.38*   < > 1.76*   ANION GAP mmol/L 14*   < > 13   CALCIUM mg/dL 9.2   < > 10.3*   ALBUMIN g/dL  --   --  4.5   TOTAL BILIRUBIN mg/dL  --   --  0.49   ALK PHOS U/L  --   --  55   ALT U/L  --   --  24   AST U/L  --   --  23   GLUCOSE RANDOM mg/dL 84   < > 135    < > = values in this interval not displayed.         Results from last 7 days   Lab Units 02/05/25  1144 02/05/25  0918 02/05/25  0723 02/04/25  2150 02/04/25  1617 02/04/25  1059 02/04/25  0734 02/03/25  2157 02/03/25  1625 02/03/25  1106 02/03/25  0802 02/03/25  0214   POC GLUCOSE mg/dl 129 142* 128 97 101 93 122 83 78 135 118 100               Recent Cultures (last 7 days):         Imaging Results Review: I reviewed radiology reports from this admission including: MRI spine.  Other Study Results Review: EKG was reviewed.     Last 24 Hours Medication List:     Current Facility-Administered Medications:     acetaminophen (TYLENOL) tablet 975 mg, Q8H JANELL    allopurinol (ZYLOPRIM) tablet 100 mg, BID    aspirin (ECOTRIN LOW STRENGTH) EC tablet 81 mg, Daily    Empagliflozin (JARDIANCE) tablet 10 mg, Daily With Dinner     enoxaparin (LOVENOX) subcutaneous injection 40 mg, Q24H JANELL    eplerenone (INSPRA) tablet 25 mg, Daily    fish oil capsule 1,000 mg, BID    gabapentin (NEURONTIN) capsule 300 mg, TID    HYDROmorphone (DILAUDID) tablet 4 mg, Q4H PRN    HYDROmorphone HCl (DILAUDID) injection 0.2 mg, Q4H PRN    insulin aspart protamine-insulin aspart (NovoLOG 70/30) 100 units/mL subcutaneous injection 50 Units, BID AC    insulin lispro (HumALOG/ADMELOG) 100 units/mL subcutaneous injection 1-5 Units, TID AC **AND** Fingerstick Glucose (POCT), TID AC    insulin lispro (HumALOG/ADMELOG) 100 units/mL subcutaneous injection 1-5 Units, HS    lidocaine (LIDODERM) 5 % patch 1 patch, Daily    losartan (COZAAR) tablet 50 mg, Daily    methocarbamol (ROBAXIN) tablet 750 mg, TID    metoprolol succinate (TOPROL-XL) 24 hr tablet 150 mg, HS    ondansetron (ZOFRAN) injection 4 mg, Q4H PRN    oxyCODONE (ROXICODONE) IR tablet 5 mg, Q6H PRN    pravastatin (PRAVACHOL) tablet 40 mg, Daily With Dinner    saccharomyces boulardii (FLORASTOR) capsule 250 mg, BID    sodium bicarbonate 150 mEq in dextrose 5 % 1,000 mL infusion, Continuous, Last Rate: 100 mL/hr (02/05/25 1409)    Administrative Statements   Today, Patient Was Seen By: Sridevi Bustillo DO      **Please Note: This note may have been constructed using a voice recognition system.**

## 2025-02-05 NOTE — ASSESSMENT & PLAN NOTE
Lab Results   Component Value Date    EGFR 48 02/04/2025    EGFR 49 02/03/2025    EGFR 43 02/02/2025    CREATININE 1.38 (H) 02/04/2025    CREATININE 1.36 (H) 02/03/2025    CREATININE 1.51 (H) 02/02/2025     Baseline creatinine 1.4-1.8.  Creatinine currently at baseline.

## 2025-02-05 NOTE — H&P
H&P - Hospitalist   Name: Stewart Flores 79 y.o. male I MRN: [id number]  Unit/Bed#: S -01 I Date of Admission: 2/4/2025   Date of Service: 2/4/2025 I Hospital Day: 0     Assessment & Plan  Acute on chronic low back pain with bilateral sciatica  Worsening pain over the last 2 days with pain radiating back both lower extremities.  Significant weakness in lower extremities because of pain.  Difficulty ambulating.  No bowel or bladder dysfunction.  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  So patient transferred here for neurosurgery evaluation.  Continue with multimodal pain regimen with Tylenol, gabapentin, lidocaine patch, as needed oxycodone, Flexeril.  Will also start the patient on prednisone.  PT OT eval  Consult neurosurgery  Diabetes mellitus, type 2 (HCC)  Lab Results   Component Value Date    HGBA1C 8.2 (H) 12/30/2024       Recent Labs     02/03/25  2157 02/04/25  0734 02/04/25  1059 02/04/25  1617   POCGLU 83 122 93 101       Blood Sugar Average: Last 72 hrs:    Continue insulin NovoLog 70/30 plus sliding scale.  Patient also on Jardiance.  Will hold off glipizide for now.  HTN (hypertension)  Pressure stable.  Continue losartan, metoprolol, eplerenone  Status post left foot surgery  Continue surgical shoe when out of bed.  Was eval by [facility]ry At [facility]  CKD (chronic kidney disease)  Lab Results   Component Value Date    EGFR 48 02/04/2025    EGFR 49 02/03/2025    EGFR 43 02/02/2025    CREATININE 1.38 (H) 02/04/2025    CREATININE 1.36 (H) 02/03/2025    CREATININE 1.51 (H) 02/02/2025     Baseline creatinine 1.4-1.8.  Creatinine currently at baseline.      VTE Pharmacologic Prophylaxis:   Moderate Risk (Score 3-4) - Pharmacological DVT Prophylaxis Ordered: enoxaparin (Lovenox).  Code Status: Level 1 - Full Code     Anticipated Length of Stay: Patient will be admitted on an inpatient basis with an  anticipated length of stay of greater than 2 midnights secondary to above.    History of Present Illness   Chief Complaint: back pain    Stewart Flores is a 79 y.o. male who presents as a transfer from [facility] for  for neurosurgery evaluation.  He presented then with acute low back pain radiating to both his lower extremities after his recent left foot surgery.  Reports history of chronic low back pain but has never been this bad.  No trauma or injuries.  No other specific triggering factors.    MRI of the LS spine was done that showed disc protrusion with cord compression so patient was transferred here for neurosurgical evaluation.    Review of Systems    Historical Information   Past Medical History:   Diagnosis Date    Arthritis     Chronic kidney disease     Diabetes mellitus (HCC)     History of wound infection ?2013    RIGHT LOWER LEG. WAS + FOR STAPH INFECTION AT THAT TIME    Hypertension     Shoulder abrasion     right side after a fall in Feb 2018     Past Surgical History:   Procedure Laterality Date    BONE EXOSTOSIS EXCISION Left 1/21/2025    Procedure: EXCISION EXOSTOSIS/saucerization left foot;  Surgeon: Ollie White DPM;  Location: [facility];  Service: Podiatry    CHOLECYSTECTOMY      COLONOSCOPY      KNEE ARTHROPLASTY Left 2014    SD EXC B9 LESION MRGN XCP SK TG S/N/H/F/G 1.1-2.0CM Left 07/30/2018    Procedure: EXCISIONAL BIOPSY BENIGN NEOPLASM OF SKIN EXTREMITY;  Surgeon: Julio Cesar Trejo Jr., DPM;  Location: [facility];  Service: Podiatry    STEROID INJECTION SHOULDER Right     8 CERIVAL SPINE INJECTIONS    TONSILLECTOMY       Social History     Tobacco Use    Smoking status: Every Day     Types: Cigars    Smokeless tobacco: Never    Tobacco comments:     1 cigar a day.   Substance and Sexual Activity    Alcohol use: Yes     Alcohol/week: 5.0 standard drinks of alcohol     Types: 5 Cans of beer per week     Comment: DAY,SOMETIMES MORE PER PATIENT    Drug use: No    Sexual activity: Not  on file     E-Cigarette/Vaping     E-Cigarette/Vaping Substances     No family history on file.  Social History:  Marital Status: /Civil Union   Occupation:   Patient Pre-hospital Living Situation:   Patient Pre-hospital Level of Mobility:   Patient Pre-hospital Diet Restrictions:     Meds/Allergies     Prior to Admission medications    Medication Sig Start Date End Date Taking? Authorizing Provider   allopurinol (ZYLOPRIM) 100 mg tablet Take 100 mg by mouth 2 (two) times a day    Historical Provider, MD   aspirin (ECOTRIN LOW STRENGTH) 81 mg EC tablet Take 81 mg by mouth daily    Historical Provider, MD   Continuous Glucose Sensor (FreeStyle [place] 2 Sensor) MISC USE 1 SENSOR EVERY 14 DAYS 11/30/24   Historical Provider, MD   dapagliflozin (Farxiga) 5 MG TABS Take by mouth daily    Historical Provider, MD   eplerenone (INSPRA) 50 MG tablet Take 50 mg by mouth 2 (two) times a day Takes 2 tabs bid    Historical Provider, MD   glipiZIDE (GLUCOTROL) 5 mg tablet Take 5 mg by mouth every morning    Historical Provider, MD   insulin lispro protamine-insulin lispro (HumaLOG 75/25) 100 units/mL Inject 60 Units under the skin 2 (two) times a day before meals 60 units a.m. And 58 units pm    Historical Provider, MD   losartan (COZAAR) 50 mg tablet Take 50 mg by mouth daily 6/11/23   Historical Provider, MD   metoprolol succinate (TOPROL-XL) 100 mg 24 hr tablet Take 150 mg by mouth daily at bedtime 6/11/23   Historical Provider, MD   omega-3-acid ethyl esters (LOVAZA) 1 g capsule Take 1 g by mouth 2 (two) times a day 7/6/23   Historical Provider, MD   rosuvastatin (CRESTOR) 5 mg tablet Take 5 mg by mouth daily 6/11/23   Historical Provider, MD   torsemide (DEMADEX) 20 mg tablet Take 20 mg by mouth 2 (two) times a day 1 tablet in AM and 1/2 tablet pm    Historical Provider, MD   acetaminophen (TYLENOL) 325 mg tablet Take 3 tablets (975 mg total) by mouth every 8 (eight) hours 2/4/25 2/4/25  Tessy Marquez PA-C    cyclobenzaprine (FLEXERIL) 10 mg tablet Take 1 tablet (10 mg total) by mouth 3 (three) times a day as needed for muscle spasms 2/4/25 2/4/25  Tessy Marquez PA-C   enoxaparin (LOVENOX) 30 mg/0.3 mL Inject 0.3 mL (30 mg total) under the skin daily 2/5/25 2/4/25  Tessy Marquez PA-C   gabapentin (NEURONTIN) 300 mg capsule Take 1 capsule (300 mg total) by mouth 3 (three) times a day 2/4/25 2/4/25  Tessy Marquez PA-C   HYDROmorphone HCl (DILAUDID) 0.2 MG/ML SOLN Inject 1 mL (0.2 mg total) into a catheter in a vein every 4 (four) hours as needed for moderate pain or severe pain for up to 10 days Max Daily Amount: 1.2 mg 2/4/25 2/4/25  Tessy Marquez PA-C   insulin lispro (HumALOG/ADMELOG) 100 units/mL injection Inject 1-5 Units under the skin 3 (three) times a day before meals 2/4/25 2/4/25  Tessy Marquez PA-C   insulin lispro (HumALOG/ADMELOG) 100 units/mL injection Inject 1-5 Units under the skin daily at bedtime 2/4/25 2/4/25  Tessy Marquez PA-C   lidocaine (LIDODERM) 5 % Apply 1 patch topically over 12 hours daily Remove & Discard patch within 12 hours or as directed by MD 2/4/25 2/4/25  Tessy Marquez PA-C   oxyCODONE (ROXICODONE) 5 immediate release tablet Take 1 tablet (5 mg total) by mouth every 6 (six) hours as needed for severe pain for up to 10 days Max Daily Amount: 20 mg 2/4/25 2/4/25  Tessy Marquez PA-C   oxyCODONE (ROXICODONE) 5 immediate release tablet Take 0.5 tablets (2.5 mg total) by mouth every 6 (six) hours as needed for moderate pain for up to 10 days Max Daily Amount: 10 mg 2/4/25 2/4/25  Tessy Marquez PA-C   saccharomyces boulardii (FLORASTOR) 250 mg capsule Take 1 capsule (250 mg total) by mouth 2 (two) times a day 2/4/25 2/4/25  Tessy Marquez PA-C     Allergies   Allergen Reactions    Ibuprofen Other (See Comments)     To avoid due to kidney problems       Objective :  Temp:  [97 °F (36.1 °C)-97.7 °F (36.5 °C)] 97.7 °F (36.5 °C)  HR:  [56-63] 59  BP: (136-176)/(64-90) 136/64  Resp:  [16-18]  16  SpO2:  [94 %-95 %] 94 %  O2 Device: None (Room air)    Physical Exam     Constitutional: Pt appears comfortable. Not in any acute distress.  HENT:   Head: Normocephalic and atraumatic.   Eyes: EOM are normal.   Neck: Neck supple.   Cardiovascular: Normal rate, regular rhythm, normal heart sounds.  No murmur heard.  Pulmonary/Chest: Effort normal, air entry b/l equal. No respiratory distress. Pt has no wheezes or crackles.   Abdominal: Soft. Non-distended, Non-tender. Bowel sounds are normal.   Neurological: awake, alert.  Motor strength in bilateral lower extremities 3-4/5 but primarily because of pain.  Psychiatric: normal mood and affect.      Lines/Drains:            Lab Results: I have reviewed the following results:  Results from last 7 days   Lab Units 02/03/25  0525   WBC Thousand/uL 9.51   HEMOGLOBIN g/dL 14.8   HEMATOCRIT % 45.1   PLATELETS Thousands/uL 183   SEGS PCT % 65   LYMPHO PCT % 24   MONO PCT % 8   EOS PCT % 2     Results from last 7 days   Lab Units 02/04/25  0541 01/31/25  0533 01/30/25  1743   SODIUM mmol/L 143   < > 141   POTASSIUM mmol/L 3.3*   < > 3.8   CHLORIDE mmol/L 111*   < > 107   CO2 mmol/L 18*   < > 21   BUN mg/dL 21   < > 29*   CREATININE mg/dL 1.38*   < > 1.76*   ANION GAP mmol/L 14*   < > 13   CALCIUM mg/dL 9.2   < > 10.3*   ALBUMIN g/dL  --   --  4.5   TOTAL BILIRUBIN mg/dL  --   --  0.49   ALK PHOS U/L  --   --  55   ALT U/L  --   --  24   AST U/L  --   --  23   GLUCOSE RANDOM mg/dL 84   < > 135    < > = values in this interval not displayed.         Results from last 7 days   Lab Units 02/04/25  1617 02/04/25  1059 02/04/25  0734 02/03/25  2157 02/03/25  1625 02/03/25  1106 02/03/25  0802 02/03/25  0214 02/02/25  2013 02/02/25  1633 02/02/25  1121 02/02/25  0716   POC GLUCOSE mg/dl 101 93 122 83 78 135 118 100 100 80 133 125     Lab Results   Component Value Date    HGBA1C 8.2 (H) 12/30/2024    HGBA1C 8.4 (H) 09/16/2024    HGBA1C 9.1 (H) 03/09/2024                  Administrative Statements       ** Please Note: This note has been constructed using a voice recognition system. **

## 2025-02-05 NOTE — OCCUPATIONAL THERAPY NOTE
Occupational Therapy Cancellation     Patient Name: Stewart Flores  Today's Date: 2/5/2025  Problem List  Principal Problem:    Acute on chronic low back pain with bilateral sciatica  Active Problems:    Diabetes mellitus, type 2 (HCC)    HTN (hypertension)    Status post left foot surgery    CKD (chronic kidney disease)    Past Medical History  Past Medical History:   Diagnosis Date    Arthritis     Chronic kidney disease     Diabetes mellitus (HCC)     History of wound infection ?2013    RIGHT LOWER LEG. WAS + FOR STAPH INFECTION AT THAT TIME    Hypertension     Shoulder abrasion     right side after a fall in Feb 2018     Past Surgical History  Past Surgical History:   Procedure Laterality Date    BONE EXOSTOSIS EXCISION Left 1/21/2025    Procedure: EXCISION EXOSTOSIS/saucerization left foot;  Surgeon: Ollie White DPM;  Location: [facility];  Service: Podiatry    CHOLECYSTECTOMY      COLONOSCOPY      KNEE ARTHROPLASTY Left 2014    CT EXC B9 LESION MRGN XCP SK TG S/N/H/F/G 1.1-2.0CM Left 07/30/2018    Procedure: EXCISIONAL BIOPSY BENIGN NEOPLASM OF SKIN EXTREMITY;  Surgeon: Julio Cesar Trejo Jr., DPM;  Location: [facility];  Service: Podiatry    STEROID INJECTION SHOULDER Right     8 CERIVAL SPINE INJECTIONS    TONSILLECTOMY          02/05/25 1030   Note Type   Note type Cancelled Session  (Wed 2/5/25)   Cancel Reasons Other  (pend neurosurgery consult)   Additional Comments OT orders received and chart review completed. Spoke w/ PT RJ. Will cancel pend neurosurgery consult, and continue to follow as appropriate / schedule allows       Kimberlee Sanders, NOYR/CORNELIO  [id number]  [id number]

## 2025-02-05 NOTE — ASSESSMENT & PLAN NOTE
Lab Results   Component Value Date    EGFR 48 02/04/2025    EGFR 49 02/03/2025    EGFR 43 02/02/2025    CREATININE 1.38 (H) 02/04/2025    CREATININE 1.36 (H) 02/03/2025    CREATININE 1.51 (H) 02/02/2025

## 2025-02-05 NOTE — UTILIZATION REVIEW
Initial Clinical Review    The patient was transferred to [facility] on 2/4 from [facility], where care began on 1/30.  5 midnights have already been surpassed with active ongoing care.     Admission: Date/Time/Statement:   Admission Orders (From admission, onward)       Ordered        02/04/25 1823  INPATIENT ADMISSION  Once                          Orders Placed This Encounter   Procedures    INPATIENT ADMISSION     Standing Status:   Standing     Number of Occurrences:   1     Level of Care:   Med Surg [16]     Estimated length of stay:   More than 2 Midnights     Certification:   I certify that inpatient services are medically necessary for this patient for a duration of greater than two midnights. See H&P and MD Progress Notes for additional information about the patient's course of treatment.     ED Arrival Information       Patient not seen in ED                       No chief complaint on file.      Initial Presentation: 79 y.o. male who presents as a transfer from [facility] for an for neurosurgery evaluation. He presented then with acute low back pain radiating to both his lower extremities after his recent left foot surgery. Reports history of chronic low back pain but has never been this bad. No trauma or injuries. Plan: Inpatient admission for evaluation and treatment of acute on chronic low back pain, HTN, s/p left foot surgery, CKD: Neurosurgery consult, pain management, start prednisone, PT/OT eval, continue isnulin regimen, losartan, metoprolol, eplerenone, surgical shoe when out of bed.     Anticipated Length of Stay/Certification Statement: Patient will be admitted on an inpatient basis with an anticipated length of stay of greater than 2 midnights secondary to above.     Scheduled Medications:  acetaminophen, 975 mg, Oral, Q8H JANELL  allopurinol, 100 mg, Oral, BID  aspirin, 81 mg, Oral, Daily  Empagliflozin, 10 mg, Oral, Daily With Dinner  enoxaparin, 40 mg,  Subcutaneous, Q24H JANELL  eplerenone, 25 mg, Oral, Daily  fish oil, 1,000 mg, Oral, BID  gabapentin, 300 mg, Oral, TID  insulin aspart protamine-insulin aspart, 50 Units, Subcutaneous, BID AC  insulin lispro, 1-5 Units, Subcutaneous, TID AC  insulin lispro, 1-5 Units, Subcutaneous, HS  lidocaine, 1 patch, Topical, Daily  losartan, 50 mg, Oral, Daily  methocarbamol, 750 mg, Oral, TID  metoprolol succinate, 150 mg, Oral, HS  pravastatin, 40 mg, Oral, Daily With Dinner  saccharomyces boulardii, 250 mg, Oral, BID      Continuous IV Infusions:  sodium bicarbonate 150 mEq in dextrose 5 % 1,000 mL infusion, 100 mL/hr, Intravenous, Continuous      PRN Meds:  HYDROmorphone, 4 mg, Oral, Q4H PRN  HYDROmorphone, 0.2 mg, Intravenous, Q4H PRN  ondansetron, 4 mg, Intravenous, Q4H PRN  oxyCODONE, 5 mg, Oral, Q6H PRN      ED Triage Vitals   Temperature Pulse Respirations Blood Pressure SpO2 Pain Score   02/05/25 0903 02/04/25 1809 02/04/25 1809 02/04/25 1809 02/04/25 1809 02/04/25 1800   97.5 °F (36.4 °C) 59 16 136/64 94 % 3     Weight (last 2 days)       None            Vital Signs (last 3 days)       Date/Time Temp Pulse Resp BP MAP (mmHg) SpO2 Patient Position - Orthostatic VS Pain    02/05/25 10:24:17 -- 60 -- 141/75 97 92 % -- --    02/05/25 0926 -- -- -- -- -- -- -- 10 - Worst Possible Pain    02/05/25 09:03:46 97.5 °F (36.4 °C) 64 20 162/74 103 96 % -- --    02/04/25 2208 -- 64 -- 155/79 -- -- -- --    02/04/25 18:09:52 -- 59 16 136/64 88 94 % Lying --    02/04/25 1800 -- -- -- -- -- -- -- 3              Pertinent Labs/Diagnostic Test Results:   Radiology:  No orders to display     Cardiology:  No orders to display     GI:  No orders to display           Results from last 7 days   Lab Units 02/03/25  0525 02/02/25  0546 02/01/25  0542 01/31/25  0637 01/30/25  1743   WBC Thousand/uL 9.51 9.42 10.06 13.33* 12.52*   HEMOGLOBIN g/dL 14.8 14.3 14.4 15.4 15.4   HEMATOCRIT % 45.1 43.3 43.7 46.3 46.3   PLATELETS Thousands/uL 183 185  171 219 217   TOTAL NEUT ABS Thousands/µL 6.16 6.26 6.69  --  9.19*         Results from last 7 days   Lab Units 02/04/25  0541 02/03/25  0525 02/02/25  0546 02/01/25  0542 01/31/25  0533   SODIUM mmol/L 143 141 142 141 140   POTASSIUM mmol/L 3.3* 3.7 3.4* 3.5 3.9   CHLORIDE mmol/L 111* 111* 111* 111* 107   CO2 mmol/L 18* 17* 19* 18* 19*   ANION GAP mmol/L 14* 13 12 12 14*   BUN mg/dL 21 21 23 29* 30*   CREATININE mg/dL 1.38* 1.36* 1.51* 1.60* 1.82*   EGFR ml/min/1.73sq m 48 49 43 40 34   CALCIUM mg/dL 9.2 8.9 9.0 9.2 9.8   MAGNESIUM mg/dL  --   --   --   --  2.0     Results from last 7 days   Lab Units 01/30/25  1743   AST U/L 23   ALT U/L 24   ALK PHOS U/L 55   TOTAL PROTEIN g/dL 7.4   ALBUMIN g/dL 4.5   TOTAL BILIRUBIN mg/dL 0.49   BILIRUBIN DIRECT mg/dL 0.10     Results from last 7 days   Lab Units 02/05/25  1144 02/05/25  0918 02/05/25  0723 02/04/25  2150 02/04/25  1617 02/04/25  1059 02/04/25  0734 02/03/25  2157 02/03/25  1625 02/03/25  1106 02/03/25  0802 02/03/25  0214   POC GLUCOSE mg/dl 129 142* 128 97 101 93 122 83 78 135 118 100     Results from last 7 days   Lab Units 02/04/25  0541 02/03/25  0525 02/02/25  0546 02/01/25  0542 01/31/25  0533 01/30/25  1743   GLUCOSE RANDOM mg/dL 84 104 113 125 175* 135           Results from last 7 days   Lab Units 01/31/25  1159   CLARITY UA  Clear   COLOR UA  Yellow   SPEC GRAV UA  1.015   PH UA  5.0   GLUCOSE UA mg/dl 1000 (1%)*   KETONES UA mg/dl Negative   BLOOD UA  Negative   PROTEIN UA mg/dl Negative   NITRITE UA  Negative   BILIRUBIN UA  Negative   UROBILINOGEN UA (BE) mg/dl <2.0   LEUKOCYTES UA  Negative   WBC UA /hpf None Seen   RBC UA /hpf 0-1*   BACTERIA UA /hpf None Seen   EPITHELIAL CELLS WET PREP /hpf Occasional         Past Medical History:   Diagnosis Date    Arthritis     Chronic kidney disease     Diabetes mellitus (HCC)     History of wound infection ?2013    RIGHT LOWER LEG. WAS + FOR STAPH INFECTION AT THAT TIME    Hypertension     Shoulder  abrasion     right side after a fall in Feb 2018     Present on Admission:   Acute on chronic low back pain with bilateral sciatica   Diabetes mellitus, type 2 (HCC)   HTN (hypertension)   CKD (chronic kidney disease)      Admitting Diagnosis: Acute low back pain with right-sided sciatica [M54.41]  Age/Sex: 79 y.o. male    Network Utilization Review Department  ATTENTION: Please call with any questions or concerns to [phone] and carefully listen to the prompts so that you are directed to the right person. All voicemails are confidential.   For Discharge needs, contact Care Management DC Support Team at [phone] opt. [phone]  Send all requests for admission clinical reviews, approved or denied determinations and any other requests to dedicated fax number below belonging to the campus where the patient is receiving treatment. List of dedicated fax numbers for the Facilities:  FACILITY NAME UR FAX NUMBER   ADMISSION DENIALS (Administrative/Medical Necessity) [phone]   DISCHARGE SUPPORT TEAM (NETWORK) [phone]   PARENT CHILD HEALTH (Maternity/NICU/Pediatrics) [phone]   [facility] [phone]   [facility] [phone]   [facility] [phone]   [facility] [phone]   [facility] [phone]   [facility] [phone]   [facility] [phone]   [facility] [phone]   [facility] [phone]   [facility] [phone]   [facility] [phone]   [facility] [phone]

## 2025-02-05 NOTE — ASSESSMENT & PLAN NOTE
Worsening pain over the last 2 days with pain radiating back both lower extremities.  Significant weakness in lower extremities because of pain.  Difficulty ambulating.  No bowel or bladder dysfunction.  MRI done at [place] showed disc protrusion at L4-5 causing severe spinal stenosis and compression of the nerve roots of cauda equina.  So patient transferred here for neurosurgery evaluation.  Continue with multimodal pain regimen with Tylenol, gabapentin, lidocaine patch, as needed oxycodone, Flexeril.  Will also start the patient on prednisone.  PT OT eval  Consult neurosurgery

## 2025-02-05 NOTE — PLAN OF CARE
Problem: Prexisting or High Potential for Compromised Skin Integrity  Goal: Skin integrity is maintained or improved  Description: INTERVENTIONS:  - Identify patients at risk for skin breakdown  - Assess and monitor skin integrity  - Assess and monitor nutrition and hydration status  - Monitor labs   - Assess for incontinence   - Turn and reposition patient  - Assist with mobility/ambulation  - Relieve pressure over bony prominences  - Avoid friction and shearing  - Provide appropriate hygiene as needed including keeping skin clean and dry  - Evaluate need for skin moisturizer/barrier cream  - Collaborate with interdisciplinary team   - Patient/family teaching  - Consider wound care consult   Outcome: Progressing      Health Care Proxy (HCP)

## 2025-02-05 NOTE — PHYSICAL THERAPY NOTE
PHYSICAL THERAPY EVALUATION NOTE    Patient Name: Stewart Flores  Today's Date: 2/5/2025  AGE:   79 y.o.  Mrn:   [id number]  ADMIT DX:  Acute low back pain with right-sided sciatica [M54.41]    Past Medical History:   Diagnosis Date    Arthritis     Chronic kidney disease     Diabetes mellitus (HCC)     History of wound infection ?2013    RIGHT LOWER LEG. WAS + FOR STAPH INFECTION AT THAT TIME    Hypertension     Shoulder abrasion     right side after a fall in Feb 2018     Length Of Stay: 1  PHYSICAL THERAPY EVALUATION :    02/05/25 1630   PT Last Visit   PT Visit Date 02/05/25   Pain Assessment   Pain Assessment Tool 0-10   Pain Score 8   Pain Location/Orientation Location: Back   Hospital Pain Intervention(s) Repositioned;Ambulation/increased activity;Other (Comment)  (Irasema NIÑO provided pain medication prior to session)   Pain 2   Pain Score 2 5   Pain Location/Orientation 2 Orientation: Bilateral;Location: Shoulder   Hospital Pain Intervention(s) 2 Repositioned;Ambulation/increased activity;Other (Comment)  (Irasema NSG provided pain medication prior to session)   Restrictions/Precautions   LLE Weight Bearing Per Order (S)  WBAT  (in surgical shoe, per PT eval 1/30/25)   Braces or Orthoses Other (Comment)  (surgical shoe L LE)   Other Precautions WBS;Fall Risk;Pain;Multiple lines   Home Living   Type of Home House   Home Layout One level;Other (Comment)  (6 ARTURO)   Additional Comments lives w/ spouse. ambulates w/ walker but usually w/o device. independent w/ ADLs and IADLs. no falls in last 6 months.   General   Additional Pertinent History room air resting pulse ox 96% and 82 BPM, active 94% and 89 BPM.   Family/Caregiver Present No   Cognition   Arousal/Participation Cooperative   Orientation Level Oriented to person;Other (Comment)  (pt was identified w/ full name, birth date)   Following Commands Follows one step commands  with increased time or repetition   Subjective   Subjective pt seen supine in bed. agreed to PT eval. pt stated having back and bilateral shoulder pain.   RUE Assessment   RUE Assessment X  (limited shoulder ROM, otherwise WFL)   LUE Assessment   LUE Assessment X  (limited shoulder ROM, otherwise WFL)   RLE Assessment   RLE Assessment X  (3-/5)   LLE Assessment   LLE Assessment X  (hip and knee 3/5, ankle 3-/5)   Vision-Basic Assessment   Current Vision Wears glasses only for reading   Coordination   Sensation X  (light touch impaired bilateral toes)   Bed Mobility   Rolling R 4  Minimal assistance   Additional items Assist x 1;HOB elevated;Bedrails;Increased time required;Verbal cues;LE management  (for bedrail use, LE positioning)   Supine to Sit 3  Moderate assistance  (log roll)   Additional items Assist x 1;HOB elevated;Bedrails;Increased time required;Verbal cues;LE management  (for trunk/LE positioning, breathing technique)   Sit to Supine 3  Moderate assistance  (log roll)   Additional items Assist x 1;HOB elevated;Increased time required;Verbal cues;LE management  (for trunk/LE positioning)   Additional Comments pt was dependent for donning surgical shoe to L LE   Transfers   Sit to Stand 2  Maximal assistance   Additional items Assist x 1;Increased time required;Verbal cues  (for hand placement, LE positioning)   Stand to Sit 3  Moderate assistance   Additional items Assist x 1;Increased time required;Verbal cues  (for body positioning, hand placement)   Stand pivot Unable to assess   Additional Comments pt stood approximately 10 seconds w/ roller walker and modx1. additional standing not possible due to pain and fatigue. pt was unable to mobilize to bedside chair or complete pregait activities.  (pt declined additional mobility due to pain and fatigue.)   Ambulation/Elevation   Gait pattern Not appropriate   Assistive Device Rolling walker   Balance   Static Sitting Fair   Static Standing Poor  (w/ roller  walker)   Ambulatory Zero   Activity Tolerance   Activity Tolerance Patient limited by pain;Patient limited by fatigue   Nurse Made Aware spoke to Irasema NIÑO   Assessment   Problem List Decreased strength;Decreased range of motion;Impaired balance;Decreased mobility;Pain;Obesity;Decreased endurance;Decreased safety awareness;Impaired sensation   Assessment Pt presents as a transfer from [facility] for an for neurosurgery evaluation. He presented then with acute low back pain radiating to both his lower extremities after his recent left foot surgery. Dx: acute on chronic low back pain with bilateral sciatica, HTN, CKD, and s/p left foot surgery. order placed for PT eval and tx, w/ activity order of ambulate patient. Pt is WBAT L LE in surgical shoe. pt presents w/ comorbidities of CKD, arthritis, DM, HTN, gout, left knee arthroscopy, polyneuropathy, and left foot Charcot and personal factors of advanced age and stair(s) to enter home. pt presents w/ pain, weakness, decreased ROM, decreased endurance, impaired balance, altered sensation, decreased safety awareness, orthopedic restrictions, fall risk, and impaired skin integrity. these impairments are evident in findings from physical examination (weakness, decreased ROM, and altered sensation), mobility assessment (need for mod to max assist w/ bed mobility and transfers, inability to mobilize to bedside chair or ambulate, need for input for mobility technique/safety), and Barthel Index: 35/100. pt needed input for task focus and mobility technique/safety. pt is at risk for falls due to physical and safety awareness deficits. pt's clinical presentation is unstable/unpredictable (evident in need for assist w/ all phases of mobility when usually mobilizing independently, pain impacting overall mobility status, and need for input for task focus and mobility technique). pt needs inpatient PT tx to improve mobility deficits and progress mobility training as appropriate.    Goals   Patient Goals I want to go home and see my dog.   STG Expiration Date 02/19/25   Short Term Goal #1 pt will: Increase bilateral LE strength 1/2 grade to facilitate independent mobility, Perform bed mobility w/ supervision to increase level of independence, Perform all transfers w/ supervision to improve independence, Ambulate 200 ft. with least restrictive assistive device w/ minx1 w/o LOB to improve functional independence, Navigate 63 stair(s) w/ minx1 with unilateral handrail to facilitate return to previous living environment, Increase ambulatory balance 1 grade to decrease risk for falls, Complete exercise program independently to increase strength and endurance, Tolerate 3 hr OOB to faciliate upright tolerance, Tolerate standing 3 minutes w/ supervision to facilitate functional task performance, Improve Barthel Index score to 55 or greater to facilitate independence, and Complete Timed Up and Go or Comfortable Gait Speed to further assess mobility and monitor progress   PT Treatment Day 0   Plan   Treatment/Interventions Functional transfer training;LE strengthening/ROM;Elevations;Therapeutic exercise;Endurance training;Patient/family training;Equipment eval/education;Bed mobility;Gait training;Compensatory technique education   PT Frequency 3-5x/wk   Discharge Recommendation   Rehab Resource Intensity Level, PT II (Moderate Resource Intensity)   Additional Comments (S)  Pt would benefit from Acute Pain Service consult to address pain control, which is affecting pt's mobility status.   AM-PAC Basic Mobility Inpatient   Turning in Flat Bed Without Bedrails 2   Lying on Back to Sitting on Edge of Flat Bed Without Bedrails 1   Moving Bed to Chair 1   Standing Up From Chair Using Arms 2   Walk in Room 1   Climb 3-5 Stairs With Railing 1   Basic Mobility Inpatient Raw Score 8   Turning Head Towards Sound 4   Follow Simple Instructions 3   Low Function Basic Mobility Raw Score  15   Low Function Basic  Mobility Standardized Score  23.9   [facility] Highest Level Of Mobility   -[facility] Goal 3: Sit at edge of bed   -[facility] Achieved 3: Sit at edge of bed   Barthel Index   Feeding 10   Bathing 0   Grooming Score 5   Dressing Score 5   Bladder Score 5   Bowels Score 5   Toilet Use Score 0   Transfers (Bed/Chair) Score 5   Mobility (Level Surface) Score 0   Stairs Score 0   Barthel Index Score 35   End of Consult   Patient Position at End of Consult Supine;Bed/Chair alarm activated;All needs within reach     The patient's AM-PAC Basic Mobility Inpatient Short Form Raw Score is 8. A Raw score of less than or equal to 16 suggests the patient may benefit from discharge to post-acute rehabilitation services. Please also refer to the recommendation of the Physical Therapist for safe discharge planning.    Skilled PT recommended while in hospital and upon DC to progress pt toward treatment goals.     Jose Rolle, PT

## 2025-02-05 NOTE — ASSESSMENT & PLAN NOTE
Worsening pain over the last 2 days with pain radiating back both lower extremities.  Significant weakness in lower extremities because of pain.  Difficulty ambulating.  No bowel or bladder dysfunction.  MRI done at [place] showed disc protrusion at L4-5 causing severe spinal stenosis and compression of the nerve roots of cauda equina.  So patient transferred here for neurosurgery evaluation.  Continue with multimodal pain regimen with Tylenol, gabapentin, lidocaine patch, as needed oxycodone  Change robaxin to 750 mg tid  PT OT eval  Neurosurgery consulted

## 2025-02-05 NOTE — CASE MANAGEMENT
Case Management Assessment & Discharge Planning Note    Patient name Stewart Flores  Location S /S -01 MRN [id number]  : 1945 Date 2025       Current Admission Date: 2025  Current Admission Diagnosis:Acute on chronic low back pain with bilateral sciatica   Patient Active Problem List    Diagnosis Date Noted Date Diagnosed    CKD (chronic kidney disease) 2025     Status post left foot surgery 2025     Acute on chronic low back pain with bilateral sciatica 2025     S/P total knee arthroplasty, left 2025     Smoking - cigars daily 2025     Gout 2025     Diabetes mellitus, type 2 (HCC) 2025     HTN (hypertension) 2025     Hyperlipidemia 2025       LOS (days): 1  Geometric Mean LOS (GMLOS) (days): 2.9  Days to GMLOS:2     OBJECTIVE:    Risk of Unplanned Readmission Score: 12.68         Current admission status: Inpatient       Preferred Pharmacy:   [facility] [address] [address]  Phone: [phone] Fax: [phone]    [facility] - [address] [facility]  [place] [address]  Phone: [phone] Fax: [phone]    Primary Care Provider: Brady Reilly PA-C    Primary Insurance: [facility]  Secondary Insurance:     ASSESSMENT:  Active Health Care Proxies    There are no active Health Care Proxies on file.                 Readmission Root Cause  30 Day Readmission: Yes  During your hospital stay, did someone (provider, nurse, ) explain your care to you in a way you could understand?: Yes  Did you feel medically stable to leave the hospital?: Yes  Were you able to pay for your medication at the pharmacy?: Yes  Did you have reliable transportation to take you to your appointments?: Yes  During previous admission, was a post-acute recommendation made?: No  Patient was  readmitted due to: Acute back pain    Patient Information  Admitted from:: Home  Mental Status: Alert  During Assessment patient was accompanied by: Not accompanied during assessment  Assessment information provided by:: Spouse  Primary Caregiver: Family  Support Systems: Self, Spouse/significant other  County of Residence: [place]  What city do you live in?: [place]  Home entry access options. Select all that apply.: Stairs  Number of steps to enter home.: 2  Do the steps have railings?: Yes  Type of Current Residence: [facility]  Living Arrangements: Lives w/ Spouse/significant other    Activities of Daily Living Prior to Admission  Functional Status: Independent  Completes ADLs independently?: Yes  Ambulates independently?: Yes  Does patient use assisted devices?: Yes  Assisted Devices (DME) used: Walker, Straight Cane (Started using a walker last week)  Does patient currently own DME?: Yes  What DME does the patient currently own?: Walker, Straight Cane  Does patient have a history of Outpatient Therapy (PT/OT)?: No  Does the patient have a history of Short-Term Rehab?: No  Does patient have a history of HHC?: Yes  Does patient currently have HHC?: No         Patient Information Continued  Income Source: Pension/[employer]  Does patient have prescription coverage?: Yes  Does patient receive dialysis treatments?: No  Does patient have a history of substance abuse?: No  Does patient have a history of Mental Health Diagnosis?: No         Means of Transportation  Means of Transport to Appts:: Family transport          DISCHARGE DETAILS:    Discharge planning discussed with:: Spouse- Karolyn  Freedom of Choice: Yes  Comments - Freedom of Choice: Cm attempted to meet with patient, but patient was resting. Cm contacted patients spouse Karolyn to introduce self and role. Patient from home and independent at baseline. He recently started using a walker last week due to weakness. Karolyn reports patient has had HHC in the past and  had a good experience so feels he will be open to [facility] upon discharge but recommends following up with patient before putting referrals out. Spouse would like if patient was able to be set up with transport if able due to steps to enter the home  CM contacted family/caregiver?: Yes  Were Treatment Team discharge recommendations reviewed with patient/caregiver?: Yes  Did patient/caregiver verbalize understanding of patient care needs?: Yes  Were patient/caregiver advised of the risks associated with not following Treatment Team discharge recommendations?: Yes    Contacts  Patient Contacts: Karolyn Flores (wife)  Relationship to Patient:: Family  Contact Method: Phone  Reason/Outcome: Emergency Contact, Discharge Planning

## 2025-02-06 PROBLEM — I73.9 PAD (PERIPHERAL ARTERY DISEASE) (HCC): Status: ACTIVE | Noted: 2017-06-01

## 2025-02-06 PROBLEM — G47.34 OXYGEN DESATURATION DURING SLEEP: Status: ACTIVE | Noted: 2025-02-06

## 2025-02-06 PROBLEM — E87.6 HYPOKALEMIA: Status: ACTIVE | Noted: 2025-02-06

## 2025-02-06 PROBLEM — N18.30 CKD (CHRONIC KIDNEY DISEASE) STAGE 3, GFR 30-59 ML/MIN (HCC): Status: ACTIVE | Noted: 2025-02-03

## 2025-02-06 LAB
ALBUMIN SERPL BCG-MCNC: 4 G/DL (ref 3.5–5)
ANION GAP SERPL CALCULATED.3IONS-SCNC: 12 MMOL/L (ref 4–13)
BUN SERPL-MCNC: 25 MG/DL (ref 5–25)
CALCIUM SERPL-MCNC: 9.3 MG/DL (ref 8.4–10.2)
CHLORIDE SERPL-SCNC: 106 MMOL/L (ref 96–108)
CO2 SERPL-SCNC: 23 MMOL/L (ref 21–32)
CREAT SERPL-MCNC: 1.42 MG/DL (ref 0.6–1.3)
ERYTHROCYTE [DISTWIDTH] IN BLOOD BY AUTOMATED COUNT: 14 % (ref 11.6–15.1)
GFR SERPL CREATININE-BSD FRML MDRD: 46 ML/MIN/1.73SQ M
GLUCOSE SERPL-MCNC: 143 MG/DL (ref 65–140)
GLUCOSE SERPL-MCNC: 163 MG/DL (ref 65–140)
GLUCOSE SERPL-MCNC: 173 MG/DL (ref 65–140)
GLUCOSE SERPL-MCNC: 205 MG/DL (ref 65–140)
GLUCOSE SERPL-MCNC: 206 MG/DL (ref 65–140)
GLUCOSE SERPL-MCNC: 263 MG/DL (ref 65–140)
HCT VFR BLD AUTO: 43.4 % (ref 36.5–49.3)
HGB BLD-MCNC: 14.5 G/DL (ref 12–17)
MAGNESIUM SERPL-MCNC: 2.1 MG/DL (ref 1.9–2.7)
MCH RBC QN AUTO: 30.9 PG (ref 26.8–34.3)
MCHC RBC AUTO-ENTMCNC: 33.4 G/DL (ref 31.4–37.4)
MCV RBC AUTO: 93 FL (ref 82–98)
PHOSPHATE SERPL-MCNC: 3.1 MG/DL (ref 2.3–4.1)
PLATELET # BLD AUTO: 205 THOUSANDS/UL (ref 149–390)
PMV BLD AUTO: 9.9 FL (ref 8.9–12.7)
POTASSIUM SERPL-SCNC: 3.4 MMOL/L (ref 3.5–5.3)
RBC # BLD AUTO: 4.69 MILLION/UL (ref 3.88–5.62)
SODIUM SERPL-SCNC: 141 MMOL/L (ref 135–147)
WBC # BLD AUTO: 11.32 THOUSAND/UL (ref 4.31–10.16)

## 2025-02-06 PROCEDURE — 99232 SBSQ HOSP IP/OBS MODERATE 35: CPT

## 2025-02-06 PROCEDURE — 80069 RENAL FUNCTION PANEL: CPT | Performed by: STUDENT IN AN ORGANIZED HEALTH CARE EDUCATION/TRAINING PROGRAM

## 2025-02-06 PROCEDURE — 99232 SBSQ HOSP IP/OBS MODERATE 35: CPT | Performed by: PHYSICIAN ASSISTANT

## 2025-02-06 PROCEDURE — 82948 REAGENT STRIP/BLOOD GLUCOSE: CPT

## 2025-02-06 PROCEDURE — 97167 OT EVAL HIGH COMPLEX 60 MIN: CPT

## 2025-02-06 PROCEDURE — 83735 ASSAY OF MAGNESIUM: CPT | Performed by: STUDENT IN AN ORGANIZED HEALTH CARE EDUCATION/TRAINING PROGRAM

## 2025-02-06 PROCEDURE — 97530 THERAPEUTIC ACTIVITIES: CPT

## 2025-02-06 PROCEDURE — 85027 COMPLETE CBC AUTOMATED: CPT | Performed by: STUDENT IN AN ORGANIZED HEALTH CARE EDUCATION/TRAINING PROGRAM

## 2025-02-06 PROCEDURE — 97535 SELF CARE MNGMENT TRAINING: CPT

## 2025-02-06 RX ORDER — POTASSIUM CHLORIDE 1500 MG/1
40 TABLET, EXTENDED RELEASE ORAL ONCE
Status: COMPLETED | OUTPATIENT
Start: 2025-02-06 | End: 2025-02-06

## 2025-02-06 RX ORDER — AMOXICILLIN 250 MG
2 CAPSULE ORAL
Status: DISCONTINUED | OUTPATIENT
Start: 2025-02-06 | End: 2025-02-07 | Stop reason: HOSPADM

## 2025-02-06 RX ORDER — INSULIN ASPART 100 [IU]/ML
40 INJECTION, SUSPENSION SUBCUTANEOUS
Status: DISCONTINUED | OUTPATIENT
Start: 2025-02-06 | End: 2025-02-07 | Stop reason: HOSPADM

## 2025-02-06 RX ORDER — POLYETHYLENE GLYCOL 3350 17 G/17G
17 POWDER, FOR SOLUTION ORAL DAILY PRN
Status: DISCONTINUED | OUTPATIENT
Start: 2025-02-06 | End: 2025-02-07 | Stop reason: HOSPADM

## 2025-02-06 RX ORDER — INSULIN LISPRO 100 [IU]/ML
1-5 INJECTION, SOLUTION INTRAVENOUS; SUBCUTANEOUS
Status: DISCONTINUED | OUTPATIENT
Start: 2025-02-06 | End: 2025-02-07 | Stop reason: HOSPADM

## 2025-02-06 RX ORDER — INSULIN LISPRO 100 [IU]/ML
1-6 INJECTION, SOLUTION INTRAVENOUS; SUBCUTANEOUS
Status: DISCONTINUED | OUTPATIENT
Start: 2025-02-06 | End: 2025-02-07 | Stop reason: HOSPADM

## 2025-02-06 RX ADMIN — Medication 250 MG: at 09:28

## 2025-02-06 RX ADMIN — PRAVASTATIN SODIUM 40 MG: 40 TABLET ORAL at 17:33

## 2025-02-06 RX ADMIN — ALLOPURINOL 100 MG: 100 TABLET ORAL at 09:28

## 2025-02-06 RX ADMIN — ALLOPURINOL 100 MG: 100 TABLET ORAL at 17:33

## 2025-02-06 RX ADMIN — GABAPENTIN 300 MG: 300 CAPSULE ORAL at 23:02

## 2025-02-06 RX ADMIN — INSULIN ASPART 40 UNITS: 100 INJECTION, SUSPENSION SUBCUTANEOUS at 17:37

## 2025-02-06 RX ADMIN — Medication 7.5 MG: at 14:53

## 2025-02-06 RX ADMIN — METHOCARBAMOL TABLETS 750 MG: 750 TABLET, COATED ORAL at 09:28

## 2025-02-06 RX ADMIN — SENNOSIDES AND DOCUSATE SODIUM 2 TABLET: 50; 8.6 TABLET ORAL at 23:02

## 2025-02-06 RX ADMIN — EPLERENONE 25 MG: 25 TABLET, FILM COATED ORAL at 09:29

## 2025-02-06 RX ADMIN — METOPROLOL SUCCINATE 150 MG: 100 TABLET, EXTENDED RELEASE ORAL at 23:02

## 2025-02-06 RX ADMIN — Medication 250 MG: at 17:33

## 2025-02-06 RX ADMIN — INSULIN LISPRO 2 UNITS: 100 INJECTION, SOLUTION INTRAVENOUS; SUBCUTANEOUS at 17:38

## 2025-02-06 RX ADMIN — POTASSIUM CHLORIDE 40 MEQ: 1500 TABLET, EXTENDED RELEASE ORAL at 09:37

## 2025-02-06 RX ADMIN — INSULIN LISPRO 2 UNITS: 100 INJECTION, SOLUTION INTRAVENOUS; SUBCUTANEOUS at 13:06

## 2025-02-06 RX ADMIN — ENOXAPARIN SODIUM 40 MG: 40 INJECTION SUBCUTANEOUS at 09:23

## 2025-02-06 RX ADMIN — ACETAMINOPHEN 975 MG: 325 TABLET, FILM COATED ORAL at 23:02

## 2025-02-06 RX ADMIN — LIDOCAINE 5% 1 PATCH: 700 PATCH TOPICAL at 09:28

## 2025-02-06 RX ADMIN — GABAPENTIN 300 MG: 300 CAPSULE ORAL at 09:28

## 2025-02-06 RX ADMIN — GABAPENTIN 300 MG: 300 CAPSULE ORAL at 17:33

## 2025-02-06 RX ADMIN — METHOCARBAMOL TABLETS 750 MG: 750 TABLET, COATED ORAL at 23:02

## 2025-02-06 RX ADMIN — INSULIN LISPRO 1 UNITS: 100 INJECTION, SOLUTION INTRAVENOUS; SUBCUTANEOUS at 09:24

## 2025-02-06 RX ADMIN — OMEGA-3 FATTY ACIDS CAP 1000 MG 1000 MG: 1000 CAP at 09:28

## 2025-02-06 RX ADMIN — EMPAGLIFLOZIN 10 MG: 10 TABLET, FILM COATED ORAL at 17:33

## 2025-02-06 RX ADMIN — OMEGA-3 FATTY ACIDS CAP 1000 MG 1000 MG: 1000 CAP at 17:33

## 2025-02-06 RX ADMIN — ACETAMINOPHEN 975 MG: 325 TABLET, FILM COATED ORAL at 06:24

## 2025-02-06 RX ADMIN — ACETAMINOPHEN 975 MG: 325 TABLET, FILM COATED ORAL at 14:53

## 2025-02-06 RX ADMIN — ASPIRIN 81 MG: 81 TABLET ORAL at 09:28

## 2025-02-06 RX ADMIN — SODIUM BICARBONATE 100 ML/HR: 84 INJECTION, SOLUTION INTRAVENOUS at 09:31

## 2025-02-06 RX ADMIN — METHOCARBAMOL TABLETS 750 MG: 750 TABLET, COATED ORAL at 17:33

## 2025-02-06 RX ADMIN — INSULIN ASPART 50 UNITS: 100 INJECTION, SUSPENSION SUBCUTANEOUS at 09:23

## 2025-02-06 NOTE — ASSESSMENT & PLAN NOTE
Lab Results   Component Value Date    HGBA1C 8.2 (H) 12/30/2024       Recent Labs     02/05/25  1702 02/05/25  2119 02/06/25  0747 02/06/25  0916   POCGLU 105 146* 173* 205*   Mx per primary team  A1c>8.4%  Recommend follow up with Endocrinology    Blood Sugar Average: Last 72 hrs:  (P) [phone]

## 2025-02-06 NOTE — CASE MANAGEMENT
Case Management Discharge Planning Note    Patient name Stewart Flores  Location S /S -01 MRN [id number]  : 1945 Date 2025       Current Admission Date: 2025  Current Admission Diagnosis:Acute on chronic low back pain with bilateral sciatica   Patient Active Problem List    Diagnosis Date Noted Date Diagnosed    Hypokalemia 2025     Oxygen desaturation during sleep 2025     CKD (chronic kidney disease) stage 3, GFR 30-59 ml/min ([facility]) 2025     Status post left foot surgery 2025     Acute on chronic low back pain with bilateral sciatica 2025     S/P total knee arthroplasty, left 2025     Smoking - cigars daily 2025     Gout 2025     Diabetes mellitus, type 2 ([facility]) 2025     HTN (hypertension) 2025     Hyperlipidemia 2025     PAD (peripheral artery disease) ([facility]) 2017     Stage 3b chronic kidney disease ([facility]) 2014       LOS (days): 2  Geometric Mean LOS (GMLOS) (days): 2.9  Days to GMLOS:1     OBJECTIVE:  Risk of Unplanned Readmission Score: 15.74         Current admission status: Inpatient   Preferred Pharmacy:   [facility] [address] [address]  Phone: [phone] Fax: [phone]    [facility] - [address] [facility]  [place] [address]  Phone: [phone] Fax: [phone]    Primary Care Provider: Brady Reilly PA-C    Primary Insurance: [facility] REP  Secondary Insurance:     DISCHARGE DETAILS:    Discharge planning discussed with:: patient and pt's spouse Karolyn  Freedom of Choice: Yes  Comments - Freedom of Choice: CM f/u with patient at bedside re: PT/OT rcmd for STR. Pt relayed he is open to STR and deferred further STR discussion to wife Karolyn. CM contacted Karolyn via phone re: STR f/u. Karolyn relayed preferences for [facility]  and [facility] as are close to home - spouse does not have preference for one over the other if one or both are available for patient. STR referrals sent to both facilities via aidin - awaiting responses.  CM contacted family/caregiver?: Yes  Were Treatment Team discharge recommendations reviewed with patient/caregiver?: Yes  Did patient/caregiver verbalize understanding of patient care needs?: N/A- going to facility  Were patient/caregiver advised of the risks associated with not following Treatment Team discharge recommendations?: Yes    Contacts  Patient Contacts: Karolyn Flores (wife)  Relationship to Patient:: Family  Contact Method: Phone  Phone Number: [phone]  Reason/Outcome: Emergency Contact, Discharge Planning, Referral, Continuity of Care    Requested Home Health Care         Is the patient interested in C at discharge?: No    DME Referral Provided  Referral made for DME?: No    Other Referral/Resources/Interventions Provided:  Interventions: Short Term Rehab  Referral Comments: PT/OT rcmd STR. Referrals sent to [facility] and [facility] via aidin, awaiting responses.         Treatment Team Recommendation: Short Term Rehab  Discharge Destination Plan:: Short Term Rehab

## 2025-02-06 NOTE — ASSESSMENT & PLAN NOTE
Lab Results   Component Value Date    EGFR 46 02/06/2025    EGFR 48 02/04/2025    EGFR 49 02/03/2025    CREATININE 1.42 (H) 02/06/2025    CREATININE 1.38 (H) 02/04/2025    CREATININE 1.36 (H) 02/03/2025     Patient follows [facility] nephrology, baseline creatinine appears around 1.4-1.8  Metabolic acidosis resolving after sodium bicarbonate infusion, patient tolerating diet and urinating without difficulties  No evidence of retention    Estimated Creatinine Clearance: 50.3 mL/min (A) (by C-G formula based on SCr of 1.42 mg/dL (H)).

## 2025-02-06 NOTE — ASSESSMENT & PLAN NOTE
With left foot Charcot neuroarthropathy, status post left medial foot saucerization  Seen by podiatry 1/31 at [place], reviewed wound care recommendations - continue here  No local signs of infection per podiatry  Plan for outpatient post operative follow up  WBAT surgical shoe

## 2025-02-06 NOTE — PROGRESS NOTES
Progress Note - Hospitalist   Name: Stewart Flores 79 y.o. male I MRN: [id number]  Unit/Bed#: S -01 I Date of Admission: 2/4/2025   Date of Service: 2/6/2025 I Hospital Day: 2    Assessment & Plan  Acute on chronic low back pain with bilateral sciatica  Patient with history of chronic lower back pain with bilateral LE radiculopathy and neurogenic claudication presented to [facility] with worsening discomfort and difficulty ambulation over the past 1 to 2 weeks  Evaluated by neurosurgery here who is recommending medical management with analgesia and therapy  Continue close neuro-monitoring  Reviewed MRI which displays disc protrusion at L4-L5 causing severe spinal stenosis and compression of the nerve roots of cauda equina/ degenerative disc disease, right > left radiculitis  Continue multimodal pain regimen with Tylenol, gabapentin, lidocaine patch, as needed oxycodone, robaxin  Placed on bowel regimen to avoid opioid-induced constipation, closely monitor intake and output/urinary retention protocol  Diabetes mellitus, type 2 ([facility])  Lab Results   Component Value Date    HGBA1C 8.2 (H) 12/30/2024     Recent Labs     02/05/25  2119 02/06/25  0747 02/06/25  0916 02/06/25  1145   POCGLU 146* 173* 205* 263*     Home regimen 75/25 60 units twice daily, continue inpatient 40 units twice daily  Increase sliding scale to algorithm 3  Continue Jardiance  Holding home glipizide  Diabetic diet  HTN (hypertension)  Blood pressure well controlled, continue PTA losartan 50 mg, Toprol 150 mg, eplerenone  Status post left foot surgery  With left foot Charcot neuroarthropathy, status post left medial foot saucerization  Seen by podiatry 1/31 at [place], reviewed wound care recommendations - continue here  No local signs of infection per podiatry  Plan for outpatient post operative follow up  WBAT surgical shoe  CKD (chronic kidney disease) stage 3, GFR 30-59 ml/min ([facility])  Lab Results   Component Value Date    EGFR 46  02/06/2025    EGFR 48 02/04/2025    EGFR 49 02/03/2025    CREATININE 1.42 (H) 02/06/2025    CREATININE 1.38 (H) 02/04/2025    CREATININE 1.36 (H) 02/03/2025     Patient follows [facility] nephrology, baseline creatinine appears around 1.4-1.8  Metabolic acidosis resolving after sodium bicarbonate infusion, patient tolerating diet and urinating without difficulties  No evidence of retention    Estimated Creatinine Clearance: 50.3 mL/min (A) (by C-G formula based on SCr of 1.42 mg/dL (H)).    Hypokalemia  Follows nephrology for this problem, he is on eplerenone  Patient did have 1 loose BM per nursing, will continue to monitor output  Replete p.o.  Gout  In setting of renal impairment, continue patient's allopurinol  Oxygen desaturation during sleep  Per nursing patient desaturated into the 70's while sleeping with apneic like breathing, placed on oxygen with improvement  Patient is unsure if he has diagnosis of sleep apnea, will check overnight sleep study  On room air during bedside exam without pulmonary complaints    VTE Pharmacologic Prophylaxis:   lovenox    Mobility:   Basic Mobility Inpatient Raw Score: 11  JH-HLM Goal: 4: Move to chair/commode  JH-HLM Achieved: 5: Stand (1 or more minutes)  JH-HLM Goal achieved. Continue to encourage appropriate mobility.    Patient Centered Rounds: I performed bedside rounds with nursing staff today.   Discussions with Specialists or Other Care Team Provider: CM    Education and Discussions with Family / Patient: Updated  (significant other) via phone.    Current Length of Stay: 2 day(s)  Current Patient Status: Inpatient   Certification Statement: The patient will continue to require additional inpatient hospital stay due to pain regimen, placement  Discharge Plan: TBD    Code Status: Level 1 - Full Code    Subjective   Patient seen and examined. Notes difficulty ambulating due to the pain but otherwise comfortable at rest. Denies new numbness tingling or burning  sensation or lower extremities or groin. Urinating and moving bowels without difficulty. Reports he has not had much of an appetite but is going to try to eat today. Denies fevers, chills, chest pain, shortness of breath, abdominal pain. No lower foot pain.     Objective :  Temp:  [97.6 °F (36.4 °C)-99.4 °F (37.4 °C)] 99.4 °F (37.4 °C)  HR:  [61-72] 66  BP: (124-167)/(61-76) 124/61  Resp:  [18] 18  SpO2:  [93 %-97 %] 95 %    There is no height or weight on file to calculate BMI.     Input and Output Summary (last 24 hours):     Intake/Output Summary (Last 24 hours) at 2/6/2025 1359  Last data filed at 2/6/2025 0737  Gross per 24 hour   Intake 240 ml   Output 750 ml   Net -510 ml       Physical Exam  Vitals and nursing note reviewed.   Constitutional:       Appearance: He is obese. He is not ill-appearing or diaphoretic.   HENT:      Head:      Comments: Hard of hearing     Ears:      Comments: Hard of hearing  Cardiovascular:      Rate and Rhythm: Normal rate and regular rhythm.   Pulmonary:      Effort: Pulmonary effort is normal. No respiratory distress.      Comments: Decreased BS bases  Abdominal:      General: Bowel sounds are normal.      Palpations: Abdomen is soft.      Tenderness: There is no abdominal tenderness.   Musculoskeletal:         General: Deformity (left foot wrapped cdi, reviewed media) present.      Right lower leg: No edema.      Left lower leg: No edema.   Skin:     General: Skin is warm and dry.   Neurological:      Mental Status: He is alert and oriented to person, place, and time. Mental status is at baseline.      Cranial Nerves: No facial asymmetry.      Motor: Weakness present.      Comments: Strength and light sensation intact b/l UE and LE         Lab Results: I have reviewed the following results:   Results from last 7 days   Lab Units 02/06/25  0753 02/03/25  0525   WBC Thousand/uL 11.32* 9.51   HEMOGLOBIN g/dL 14.5 14.8   HEMATOCRIT % 43.4 45.1   PLATELETS Thousands/uL 205 183    SEGS PCT %  --  65   LYMPHO PCT %  --  24   MONO PCT %  --  8   EOS PCT %  --  2     Results from last 7 days   Lab Units 02/06/25  0753 01/31/25  0533 01/30/25  1743   SODIUM mmol/L 141   < > 141   POTASSIUM mmol/L 3.4*   < > 3.8   CHLORIDE mmol/L 106   < > 107   CO2 mmol/L 23   < > 21   BUN mg/dL 25   < > 29*   CREATININE mg/dL 1.42*   < > 1.76*   ANION GAP mmol/L 12   < > 13   CALCIUM mg/dL 9.3   < > 10.3*   ALBUMIN g/dL 4.0  --  4.5   TOTAL BILIRUBIN mg/dL  --   --  0.49   ALK PHOS U/L  --   --  55   ALT U/L  --   --  24   AST U/L  --   --  23   GLUCOSE RANDOM mg/dL 163*   < > 135    < > = values in this interval not displayed.     Results from last 7 days   Lab Units 02/06/25  1145 02/06/25  0916 02/06/25  0747 02/05/25  2119 02/05/25  1702 02/05/25  1144 02/05/25  0918 02/05/25  0723 02/04/25  2150 02/04/25  1617 02/04/25  1059 02/04/25  0734   POC GLUCOSE mg/dl 263* 205* 173* 146* 105 129 142* 128 97 101 93 122     Last 24 Hours Medication List:     Current Facility-Administered Medications:     acetaminophen (TYLENOL) tablet 975 mg, Q8H JANELL    allopurinol (ZYLOPRIM) tablet 100 mg, BID    aspirin (ECOTRIN LOW STRENGTH) EC tablet 81 mg, Daily    Empagliflozin (JARDIANCE) tablet 10 mg, Daily With Dinner    enoxaparin (LOVENOX) subcutaneous injection 40 mg, Q24H JANELL    eplerenone (INSPRA) tablet 25 mg, Daily    fish oil capsule 1,000 mg, BID    gabapentin (NEURONTIN) capsule 300 mg, TID    HYDROmorphone (DILAUDID) tablet 4 mg, Q4H PRN    HYDROmorphone HCl (DILAUDID) injection 0.2 mg, Q4H PRN    insulin aspart protamine-insulin aspart (NovoLOG 70/30) 100 units/mL subcutaneous injection 50 Units, BID AC    insulin lispro (HumALOG/ADMELOG) 100 units/mL subcutaneous injection 1-5 Units, TID AC **AND** Fingerstick Glucose (POCT), TID AC    insulin lispro (HumALOG/ADMELOG) 100 units/mL subcutaneous injection 1-5 Units, HS    lidocaine (LIDODERM) 5 % patch 1 patch, Daily    losartan (COZAAR) tablet 50 mg, Daily     methocarbamol (ROBAXIN) tablet 750 mg, TID    metoprolol succinate (TOPROL-XL) 24 hr tablet 150 mg, HS    ondansetron (ZOFRAN) injection 4 mg, Q4H PRN    oxyCODONE (ROXICODONE) IR tablet 5 mg, Q6H PRN    pravastatin (PRAVACHOL) tablet 40 mg, Daily With Dinner    saccharomyces boulardii (FLORASTOR) capsule 250 mg, BID    sodium bicarbonate 150 mEq in dextrose 5 % 1,000 mL infusion, Continuous, Last Rate: Stopped (02/06/25 1337)    Administrative Statements   Today, Patient Was Seen By: Eva Fragoso PA-C    **Please Note: This note may have been constructed using a voice recognition system.**

## 2025-02-06 NOTE — ASSESSMENT & PLAN NOTE
Follows nephrology for this problem, he is on eplerenone  Patient did have 1 loose BM per nursing, will continue to monitor output  Replete p.o.

## 2025-02-06 NOTE — PHYSICAL THERAPY NOTE
"                                                                                  PHYSICAL THERAPY TREATMENT NOTE    Patient Name: Stewart Flores  Today's Date: 2/6/2025 02/06/25 0914   PT Last Visit   PT Visit Date 02/06/25   Pain Assessment   Pain Assessment Tool 0-10  (Simultaneous filing. User may not have seen previous data.)   Pain Score 1  (at rest, \"50\" w/ activity  Simultaneous filing. User may not have seen previous data.)   Pain Location/Orientation Location: Arm  (Simultaneous filing. User may not have seen previous data.)   Effect of Pain on Daily Activities limits comfort, activity tolerance and I w/ ADLs   Patient's Stated Pain Goal No pain   Hospital Pain Intervention(s) Repositioned;Ambulation/increased activity  (Simultaneous filing. User may not have seen previous data.)   Restrictions/Precautions   Weight Bearing Precautions Per Order Yes   LLE Weight Bearing Per Order WBAT  (in surgical shoe, per PT eval 1/30/25  Simultaneous filing. User may not have seen previous data.)   Braces or Orthoses Other (Comment)  (surgical shoe L LE  Simultaneous filing. User may not have seen previous data.)   Other Precautions WBS;Fall Risk;Pain;Multiple lines;Chair Alarm;Bed Alarm  (Simultaneous filing. User may not have seen previous data.)   General   Chart Reviewed Yes   Family/Caregiver Present No   Cognition   Overall Cognitive Status Impaired  (alert, generally oriented. Able to follow directions during ADLs w/ + time, cues)   Arousal/Participation Alert;Cooperative  (Simultaneous filing. User may not have seen previous data.)   Attention Attends with cues to redirect  (Simultaneous filing. User may not have seen previous data.)   Orientation Level Oriented to person;Other (Comment)  (pt was identified w/ full name, birth date  Simultaneous filing. User may not have seen previous data.)   Memory Decreased recall of recent events  (+ time, cued recall at times for details, timeline recent events) "   Following Commands Follows one step commands with increased time or repetition  (Simultaneous filing. User may not have seen previous data.)   Comments Easily aroused, able to follow directions and communicate wants / needs.   Subjective   Subjective pt seen sitting edge of bed w/ Kimberlee OT present. pt needed motivation to agreed to participate in mobilization. pt states having back pain. cues were required for task focus.   Bed Mobility   Supine to Sit 3  Moderate assistance   Additional items Assist x 1;Bedrails;Increased time required;Verbal cues;LE management  (via log roll to pt's R to simulate set- up at home)   Sit to Supine 5  Supervision  (log roll  Simultaneous filing. User may not have seen previous data.)   Additional items Increased time required;Verbal cues;LE management  (for trunk/LE positioning)   Additional Comments Pt seated at EOB post eval   Transfers   Sit to Stand 3  Moderate assistance  (Simultaneous filing. User may not have seen previous data.)   Additional items Assist x 1;Increased time required;Verbal cues  (for hand placement, LE positioning  Simultaneous filing. User may not have seen previous data.)   Stand to Sit 4  Minimal assistance  (Simultaneous filing. User may not have seen previous data.)   Additional items Assist x 1;Increased time required;Verbal cues  (for body positioning, hand placement  Simultaneous filing. User may not have seen previous data.)   Stand pivot Unable to assess   Sit pivot 3  Moderate assistance   Additional items Assist x 1;Increased time required;Verbal cues  (for LE positioning, hand placement. performed twice to left (edge of bed to chair, then chair to edge of bed).)   Additional Comments pt stood for 20 seconds w/ roller walker and modx1. additional standing not possible due to pain and fatigue. pt agreed to perform sit pivot transfer w/ forward flexed posture. pt completed transition to bedside chair as noted above.  (pt tolerated sitting out of  bed in chair for approximately 3 minutes and stated wanting to return to bed. pt refused to remain out of bed, despite motivation and education. pt return to edge of bed as noted above. )   Ambulation/Elevation   Gait pattern Not appropriate   Assistive Device Rolling walker  (pt performed standing trial roller walker, then completed sit pivot transfers w/o assistive device.)   Balance   Static Sitting Fair   Dynamic Sitting Fair -   Static Standing Poor +   Ambulatory   (Will continue to assess)   Activity Tolerance   Activity Tolerance Patient limited by pain;Patient limited by fatigue  (Simultaneous filing. User may not have seen previous data.)   Medical Staff Made Aware spoke w/ CM and care coordination w/ PT, RJ or portion of session due to decreased activity tolerance, regression from baseline and anticipated physical assistance required   Nurse Made Aware spoke to Kimberlee Aguillon OT  (Simultaneous filing. User may not have seen previous data.)   Assessment   Problem List Decreased strength;Decreased range of motion;Impaired balance;Decreased mobility;Pain;Obesity;Decreased endurance;Decreased safety awareness;Impaired sensation   Assessment pt is noted to have some improvement in mobility status from previous session w/ decreased level of assistance to mobilize safely and completion of transition out of bed. pt continues to have significant safety awareness deficits. pain continues to be greatly limiting factor for pt's mobility. pt needs continued inpatient PT to decrease fall risk and increase mobility status.   Goals   Patient Goals I want to see my dog.   STG Expiration Date 02/19/25   Short Term Goal #1 pt will: Increase bilateral LE strength 1/2 grade to facilitate independent mobility, Perform bed mobility w/ supervision to increase level of independence, Perform all transfers w/ supervision to improve independence, Ambulate 200 ft. with least restrictive assistive device w/ minx1 w/o LOB to improve  functional independence, Navigate 63 stair(s) w/ minx1 with unilateral handrail to facilitate return to previous living environment, Increase ambulatory balance 1 grade to decrease risk for falls, Complete exercise program independently to increase strength and endurance, Tolerate 3 hr OOB to faciliate upright tolerance, Tolerate standing 3 minutes w/ supervision to facilitate functional task performance, Improve Barthel Index score to 55 or greater to facilitate independence, and Complete Timed Up and Go or Comfortable Gait Speed to further assess mobility and monitor progress   PT Treatment Day 1   Plan   Treatment/Interventions Functional transfer training;LE strengthening/ROM;Elevations;Therapeutic exercise;Endurance training;Patient/family training;Equipment eval/education;Bed mobility;Gait training;Compensatory technique education   Progress Slow progress, decreased activity tolerance   PT Frequency 3-5x/wk   Discharge Recommendation   Rehab Resource Intensity Level, PT II (Moderate Resource Intensity)   Additional Comments Pt would benefit from Acute Pain Service consult to address pain control, which is affecting pt's mobility status.   AM-PAC Basic Mobility Inpatient   Turning in Flat Bed Without Bedrails 3   Lying on Back to Sitting on Edge of Flat Bed Without Bedrails 2   Moving Bed to Chair 2   Standing Up From Chair Using Arms 2   Walk in Room 1   Climb 3-5 Stairs With Railing 1   Basic Mobility Inpatient Raw Score 11   Basic Mobility Standardized Score 30.25   [facility] Highest Level Of Mobility   -[facility] Goal 4: Move to chair/commode   -[facility] Achieved 5: Stand (1 or more minutes)   End of Consult   Patient Position at End of Consult Supine;Bed/Chair alarm activated;All needs within reach   The patient's AM-PAC Basic Mobility Inpatient Short Form Raw Score is 11. A Raw score of less than or equal to 16 suggests the patient may benefit from discharge to post-acute rehabilitation services. Please also  refer to the recommendation of the Physical Therapist for safe discharge planning.    Skilled inpatient PT recommended while in hospital to progress pt toward treatment goals.    Jose Rolle, PT

## 2025-02-06 NOTE — PLAN OF CARE
Problem: PHYSICAL THERAPY ADULT  Goal: Performs mobility at highest level of function for planned discharge setting.  See evaluation for individualized goals.  Description: Treatment/Interventions: Functional transfer training, LE strengthening/ROM, Elevations, Therapeutic exercise, Endurance training, Patient/family training, Equipment eval/education, Bed mobility, Gait training, Compensatory technique education          See flowsheet documentation for full assessment, interventions and recommendations.  Outcome: Progressing  Note:    Problem List: Decreased strength, Decreased range of motion, Impaired balance, Decreased mobility, Pain, Obesity, Decreased endurance, Decreased safety awareness, Impaired sensation  Assessment: pt is noted to have some improvement in mobility status from previous session w/ decreased level of assistance to mobilize safely and completion of transition out of bed. pt continues to have significant safety awareness deficits. pain continues to be greatly limiting factor for pt's mobility. pt needs continued inpatient PT to decrease fall risk and increase mobility status.        Rehab Resource Intensity Level, PT: II (Moderate Resource Intensity)    See flowsheet documentation for full assessment.

## 2025-02-06 NOTE — ASSESSMENT & PLAN NOTE
Lab Results   Component Value Date    HGBA1C 8.2 (H) 12/30/2024     Recent Labs     02/05/25  2119 02/06/25  0747 02/06/25  0916 02/06/25  1145   POCGLU 146* 173* 205* 263*     Home regimen 75/25 60 units twice daily, continue inpatient 40 units twice daily  Increase sliding scale to algorithm 3  Continue Jardiance  Holding home glipizide  Diabetic diet

## 2025-02-06 NOTE — PROGRESS NOTES
Progress Note - Neurosurgery   Name: Stewart Flores 79 y.o. male I MRN: [id number]  Unit/Bed#: S -01 I Date of Admission: 2/4/2025   Date of Service: 2/6/2025 I Hospital Day: 2    Assessment & Plan  Acute on chronic low back pain with bilateral sciatica  Patient presented with Acute on chronic Lower back pain with radiation down to the legs.  Patient noticed  acute on chronic worsening pain in his  lower back about 1-2 weeks ago . Has associated bilateral posterior Thighs and knees radicular symptoms and spasms behinfd his kees, L>R side, occasional paresthesia, and weakness.  Reports ambulatory dysfunction with difficulty standing and walking due to severe pain in the legs.    Imagings:     Lumbar spine MRI shows Multilevel degenerative discogenic disease, most notably at L4-L5 where there is a central and left central disc protrusion resulting in severe spinal stenosis and compression of the nerve roots of the cauda equina. Surgical consultation is recommended. Also additional DJD discogenic disease at L5-S1 with endplate osteophytic ridging and facet arthropathy resulting in right greater than left foraminal stenosis and bilateral subarticular zone encroachment. Correlate for right greater than left L5 and bilateral S1 radiculitis.          Plan:  Continue neuromonitoring, assess for red flags  Pain control: Recommend multimodal pain meds  DVT ppx: SCDs bilat legs, okay with DVT ppx  Activity: As tolerated  PT/OT evaluation & treatment  Brace: None  Medical Mx: Per primary team  Patient reported continuous spasmodic bilateral knees, L>R posterior knees, difficulty walking, no red flags ( B/B dysfunction or saddle anaesthesia). Patient's A1C=8.4% and has recent left medial foot diabetic foot wound debridement still healing with stitches to be removed yet. Given patient's high risk for wound infection in the setting of high A1c and also undetermined status of his left foot wound, no emergency procedure is  indicated. Discussed with Attending and agreed with OP follow up. Continue pain control, consider APS eval and pain meds adjustment. Continue PT/OT. NSX will see the patient from the periphery during his Hospital stay. Call with questions or concerns.    Diabetes mellitus, type 2 (HCC)  Lab Results   Component Value Date    HGBA1C 8.2 (H) 12/30/2024       Recent Labs     02/05/25  1702 02/05/25  2119 02/06/25  0747 02/06/25  0916   POCGLU 105 146* 173* 205*   Mx per primary team  A1c>8.4%  Recommend follow up with Endocrinology    Blood Sugar Average: Last 72 hrs:  (P) [id number]    CKD (chronic kidney disease)  Lab Results   Component Value Date    EGFR 46 02/06/2025    EGFR 48 02/04/2025    EGFR 49 02/03/2025    CREATININE 1.42 (H) 02/06/2025    CREATININE 1.38 (H) 02/04/2025    CREATININE 1.36 (H) 02/03/2025   Mx per primary team  Status post left foot surgery  Patient with left medial foot wound, s/p debridement  Incision looks clean, suture in place  No ABx      Subjective   Patient reports his pain is moderate to severe, exacerbated with movement and when trying to walk. Has difficulty walking due to spasms behind both knees, some patchy numbness in the distal legs bilaterally. No B/B dysfunction or saddle anesthesia. Reports some weakness in the left quads.    Objective :  Temp:  [97.6 °F (36.4 °C)-99.4 °F (37.4 °C)] 99.4 °F (37.4 °C)  HR:  [60-72] 66  BP: (124-167)/(61-76) 124/61  Resp:  [18] 18  SpO2:  [92 %-97 %] 95 %    I/O         02/04 0701  02/05 0700 02/05 0701 02/06 0700 02/06 0701 02/07 0700    P.O. 240 700     Total Intake 240 700     Urine  775 450    Total Output  775 450    Net +240 -75 -450                 Physical Exam  HENT:      Head: Normocephalic and atraumatic.   Eyes:      Extraocular Movements: Extraocular movements intact.      Pupils: Pupils are equal, round, and reactive to light.   Pulmonary:      Effort: Pulmonary effort is normal.   Musculoskeletal:         General:  Tenderness present.      Cervical back: Normal range of motion.   Neurological:      Sensory: Sensory deficit present.      Motor: Motor strength is normal.Weakness present.      Deep Tendon Reflexes:      Reflex Scores:       Tricep reflexes are 2+ on the right side and 2+ on the left side.       Bicep reflexes are 2+ on the right side and 2+ on the left side.       Brachioradialis reflexes are 2+ on the right side and 2+ on the left side.       Patellar reflexes are 2+ on the right side and 2+ on the left side.       Achilles reflexes are 2+ on the right side and 2+ on the left side.  Psychiatric:         Speech: Speech normal.      Neurological Exam  Mental Status  Awake, alert and oriented to person, place and time. Oriented to person, place and time. Recent and remote memory are intact. Speech is normal. Language is fluent with no aphasia.    Cranial Nerves  CN III, IV, VI: Extraocular movements intact bilaterally. Pupils equal round and reactive to light bilaterally.    Motor  Normal muscle bulk throughout. Strength is 5/5 throughout all four extremities.    Sensory  Light touch is normal in upper and lower extremities.     Reflexes                                            Right                      Left  Brachioradialis                    2+                         2+  Biceps                                 2+                         2+  Triceps                                2+                         2+  Patellar                                2+                         2+  Achilles                                2+                         2+    Right pathological reflexes: Ivet's absent. Ankle clonus absent.  Left pathological reflexes: Ivet's absent. Ankle clonus absent.    Coordination  Right: Finger-to-nose normal.Left: Finger-to-nose normal.        Lab Results: I have reviewed the following results:  Recent Labs     02/06/25  0753   WBC 11.32*   HGB 14.5   HCT 43.4      SODIUM 141   K  3.4*      CO2 23   BUN 25   CREATININE 1.42*   GLUC 163*   MG 2.1   PHOS 3.1   ALB 4.0       Imaging Results Review: I personally reviewed the following image studies in PACS and associated radiology reports: MRI spine. My interpretation of the radiology images/reports is: I reviewed images findings with the patient and findings as described in the assessment section above.  Other Study Results Review: No additional pertinent studies reviewed.    VTE Pharmacologic Prophylaxis: Sequential compression device (Venodyne)     Administrative Statements   I have spent a total time of 30 minutes in caring for this patient on the day of the visit/encounter including Diagnostic results, Prognosis, Risks and benefits of tx options, Instructions for management, Patient and family education, Importance of tx compliance, Risk factor reductions, Impressions, Documenting in the medical record, Reviewing / ordering tests, medicine, procedures  , and Obtaining or reviewing history  .

## 2025-02-06 NOTE — ASSESSMENT & PLAN NOTE
Per nursing patient desaturated into the 70's while sleeping with apneic like breathing, placed on oxygen with improvement  Patient is unsure if he has diagnosis of sleep apnea, will check overnight sleep study  On room air during bedside exam without pulmonary complaints

## 2025-02-06 NOTE — ASSESSMENT & PLAN NOTE
Patient with left medial foot wound, s/p debridement  Incision looks clean, suture in place  No ABx

## 2025-02-06 NOTE — OCCUPATIONAL THERAPY NOTE
Occupational Therapy Evaluation     Patient Name: Stewart Flores  Today's Date: 2/6/2025  Problem List  Principal Problem:    Acute on chronic low back pain with bilateral sciatica  Active Problems:    Diabetes mellitus, type 2 (HCC)    HTN (hypertension)    Status post left foot surgery    CKD (chronic kidney disease)    Past Medical History  Past Medical History:   Diagnosis Date    Arthritis     Chronic kidney disease     Diabetes mellitus (HCC)     History of wound infection ?2013    RIGHT LOWER LEG. WAS + FOR STAPH INFECTION AT THAT TIME    Hypertension     Shoulder abrasion     right side after a fall in Feb 2018     Past Surgical History  Past Surgical History:   Procedure Laterality Date    BONE EXOSTOSIS EXCISION Left 1/21/2025    Procedure: EXCISION EXOSTOSIS/saucerization left foot;  Surgeon: Ollie White DPM;  Location: [facility];  Service: Podiatry    CHOLECYSTECTOMY      COLONOSCOPY      KNEE ARTHROPLASTY Left 2014    ID EXC B9 LESION MRGN XCP SK TG S/N/H/F/G 1.1-2.0CM Left 07/30/2018    Procedure: EXCISIONAL BIOPSY BENIGN NEOPLASM OF SKIN EXTREMITY;  Surgeon: Julio Cesar Trejo Jr., DPM;  Location: [facility];  Service: Podiatry    STEROID INJECTION SHOULDER Right     8 CERIVAL SPINE INJECTIONS    TONSILLECTOMY           02/06/25 0913   Note Type   Note type Evaluation  (Eval 0831- 0803; split tx session 7261-1000, 4745-1821)   Additional Comments Identified pt by full name and birthdate   Pain Assessment   Pain Assessment Tool 0-10   Pain Score 10 - Worst Possible Pain  (50/10 w/ standing)   Pain Location/Orientation Location: Back   Effect of Pain on Daily Activities limits comfort, activity tolerance and I w/ ADLs   Hospital Pain Intervention(s) Repositioned;Ambulation/increased activity;Emotional support  (RN, Irasema bingham)   Restrictions/Precautions   Weight Bearing Precautions Per Order Yes   LLE Weight Bearing Per Order (S)  WBAT  (in surgical shoe per podiatry 1/31/25)   Braces or  Orthoses Other (Comment)  (L LE surgical shoe)   Other Precautions Chair Alarm;Bed Alarm;WBS;O2;Fall Risk;Pain;Spinal precautions  (Educated pt on spinal precautions to assist w/ symptom mgmt)   Home Living   Type of Home House  (1 SH w/ 2 vs 4 vs 6 ARTURO. Pt reports 4)   Home Layout One level;Performs ADLs on one level;Able to live on main level with bedroom/bathroom;Stairs to enter with rails   Bathroom Shower/Tub Tub/shower unit   Bathroom Toilet Raised   Bathroom Accessibility Accessible   Home Equipment Walker;Cane   Additional Comments Pt reports living w/ his wife and their dog (s) in 1 SH w/ few ARTURO   Prior Function   Level of [place] Independent with ADLs;Independent with functional mobility;Independent with IADLS  (at true baseline. Pt reports use of RW few days prior to admission)   Lives With Spouse   Receives Help From Family;Home health  (hx HHPT / OT)   IADLs Independent with driving;Independent with meal prep;Independent with medication management  (at true baseline)   Falls in the last 6 months 0   Vocational Retired   Comments Pt reports I w/ ADL/ IADL at true baseline using cane. Pt reports use of RW prior to admission and supportive wife assisting. Pt reports sleeping in traditional flat mattress   Lifestyle   Autonomy Pt reports I w/ ADL/ IADL at true baseline w/ use of cane. Pt reports use of RW few days prior to admission and limited mobility due to pain   Reciprocal Relationships Pt reports living w/ supportive wife and their dog (s). Recently had his first great grandson   Service to Others Pt reports retired ,    Intrinsic Gratification Enjoys going down to the firehouse, their dog and family   General   Additional Pertinent History Pt admitted to [facility] on 2/4/[facility] from [facility] due to continued pain limited activity engagement. Neurosurgery consulted and recommend activity as tolerated, ongoing monitoring and conservative mgmt at this time   Family/Caregiver  "Present No   Additional General Comments Personal and environmental factors supporting performance includes independent at baseline, first floor set- up, supportive wife / family, access to AD; barriers include pain, difficulty completing ADL/ IADL   Subjective   Subjective \"I am okay now if I am not moving.\"   ADL   Where Assessed Edge of bed   Eating Assistance 6  Modified independent   Eating Deficit Setup   Grooming Assistance 5  Supervision/Setup   Grooming Deficit Setup;Increased time to complete  (seated at EOB w/ + time after set- up)   UB Bathing Assistance Unable to assess  (decreaesd activity tolerance, + pain; anticipated min A based on fxal obs skills, clinical judgement)   LB Bathing Assistance Unable to assess  (anticipate max A based on fxal obs skills, clinical judgement)   UB Dressing Assistance 5  Supervision/Setup   UB Dressing Deficit Setup;Supervision/safety;Verbal cueing;Increased time to complete  (seated at EOB)   LB Dressing Assistance 2  Maximal Assistance   LB Dressing Deficit Setup;Steadying;Don/doff R shoe;Don/doff L shoe;Fasteners  (seated at EOB to don R shoe and L surgical shoe)   Toileting Assistance  Unable to assess  (denied need to void; recommend sit pivot transfer to / from commode w/ assistance from RN staff)   Additional Comments on 3L acute O2 via NC O2 sats >90%   Bed Mobility   Supine to Sit 3  Moderate assistance   Additional items Assist x 1  (via log roll to pt's R to simulate set- up at home)   Sit to Supine Unable to assess   Additional Comments Pt seated at EOB post eval   Transfers   Sit to Stand 3  Moderate assistance  (min <> mod A w/ + time elevated bed height)   Additional items Assist x 1;Bedrails;Armrests;Increased time required   Stand to Sit 4  Minimal assistance   Additional items Assist x 1;Bedrails;Armrests;Increased time required;Verbal cues   Stand pivot Unable to assess   Additional Comments Pt performed sit <> stand 2X from elevated bed height w/ min " <> mod A and + time. Unable to participate in pre-gait, complete functional transfer. Able to initiate using RW to advance R LE but declined and returned to EOB due to pain   Functional Mobility   Additional Comments Will continue to assess   Additional items Rolling walker   Balance   Static Sitting Fair   Dynamic Sitting Fair -   Static Standing Poor +   Ambulatory   (Will continue to assess)   Activity Tolerance   Activity Tolerance Patient limited by pain;Patient limited by fatigue   Medical Staff Made Aware spoke w/ CM and care coordination w/ PT, RJ or portion of session due to decreased activity tolerance, regression from baseline and anticipated physical assistance required   Nurse Made Aware spoke w/ Irasema ROLLINS pre / post eval / tx   RUE Assessment   RUE Assessment X  (premorbid limited shoulder ROM /strength)   RUE Strength   RUE Overall Strength Deficits;Due to pain   R Shoulder Flexion 2+/5   LUE Assessment   LUE Assessment X  (premorbid limited shoulder ROM /strength)   LUE Strength   LUE Overall Strength Deficits;Due to pain   L Shoulder Flexion 2+/5   Hand Function   Gross Motor Coordination Functional   Fine Motor Coordination Impaired   Vision-Basic Assessment   Current Vision Wears glasses only for reading  (glassess present on tray table but not wearing)   Cognition   Overall Cognitive Status Impaired  (alert, generally oriented. Able to follow directions during ADLs w/ + time, cues)   Arousal/Participation Alert;Cooperative   Attention Attends with cues to redirect   Orientation Level Oriented to person;Oriented to place;Oriented to situation  (able to report the year; initially stated January)   Memory Decreased recall of recent events  (+ time, cued recall at times for details, timeline recent events)   Following Commands Follows one step commands with increased time or repetition   Comments Easily aroused, able to follow directions and communicate wants / needs.   Assessment   Limitation  Decreased ADL status;Decreased UE strength;Decreased UE ROM;Decreased endurance;Decreased self-care trans;Decreased high-level ADLs  (impaired pain, activity tolerance, sitting tolerance, standing tolerance, LE ROM /strength, forward functional reach)   Assessment Pt is a 78yo male admitted to [facility] on 2/4/25 from [facility] due to ongoing pain. Neurosurgery consulted and recommend conservative mgmt at this time, and neuro monitoring. Significant PMH impacting his occupational performance includes DM II, HTN, Recent L mid foot wound debridement w/ podiatry, chronic low back pain w/ B LE radiculopathy / neurogenic claudication, Gout, L TKA. Pt reports living w/ wife in 1 SH at baseline w/ few ARTURO. Pt reports I w/ ADL/ IADL at true baseline using cane but use of RW prior to admission. Upon eval, pt alert and generally oriented. Able to follow directions during ADLs w/ encouragement to challenge activity tolerance. Pt demonstrated limited UE AROM at shoulders and reports chronic / premobid shoulder deficits. Pt required mod A to complete bed mobility supine to sit via log roll, min <> mod A sit to stand from elevated bed height 2X, max A LBD, S UBD, S grooming while seated. Pt unable to tolerate standing to complete functional transfers or engage in functional mobility due to pain. Pt is completing ADLs below baseline level of I and would benefit from OT in acute care to max I w/ ADLs, achieve highest level of function. Recommend level II rehab resource intensity when medically stable for discharge from acute care. Will continue to follow   Goals   Patient Goals Pt stated that he wants to have decreased pain and return home to [facility], baseline level of I   LTG Time Frame 10-14   Long Term Goal see below   Plan   Treatment Interventions ADL retraining;Functional transfer training;UE strengthening/ROM;Endurance training;Patient/family training;Equipment evaluation/education;Compensatory technique education;Continued  evaluation;Energy conservation;Activityengagement   Goal Expiration Date 02/16/25   OT Treatment Day 0  (Thurs 2/6/25)   OT Frequency 3-5x/wk   Discharge Recommendation   Rehab Resource Intensity Level, OT II (Moderate Resource Intensity)   AM-PAC Daily Activity Inpatient   Lower Body Dressing 2   Bathing 2   Toileting 2   Upper Body Dressing 3   Grooming 4   Eating 4   Daily Activity Raw Score 17   Daily Activity Standardized Score (Calc for Raw Score >=11) 37.26   AM-PAC Applied Cognition Inpatient   Following a Speech/Presentation 3   Understanding Ordinary Conversation 4   Taking Medications 3   Remembering Where Things Are Placed or Put Away 4   Remembering List of 4-5 Errands 3   Taking Care of Complicated Tasks 3   Applied Cognition Raw Score 20   Applied Cognition Standardized Score 41.76   Barthel Index   Feeding 10   Bathing 0   Grooming Score 5   Dressing Score 5   Bladder Score 5   Bowels Score 5   Toilet Use Score 5   Transfers (Bed/Chair) Score 5   Mobility (Level Surface) Score 0   Stairs Score 0   Barthel Index Score 40   Additional Treatment Session   Start Time 0857   End Time 0912  (8063-5259, 8694-3422)   Treatment Assessment Pt seen for skilled OT tx session day 1 following eval focusing on activity engagement, challenging activity tolerance, and ongoing eval. Pt agreeable to participate w/ encouragement and coordinated care w/ PT, RJ due to decreased activity tolerance, regression from baseline and anticipated physical assistance required. Pt agreeable to sit out of bed in chair. Pt seated at EOB post eval. Pt required mod HHA X1 sit pivot transfer EOB to pt's L to recliner chair. Pt seated OOB in chair at end of session w/ breakfast tray present. Pt calling out < 2 minutes later reporting that he can't tolerate sitting. Requested to return to bed despite encouragement / education on OOB to chair for meals. Pt required mod HHA to complete sit pivot transfer chair to EOB to his L. Pt complete  bed mobility sit to supine w/ S and + time. Pt required min A to reposition LE / trunk upon return to supine. Pt supine HOB elevated at end of session w/ needs met, call bell in reach and bed alarm activated. Continue to recommend level II rehab resource intensity when medically stable for discharge from acute care. Will continue to follow   Additional Treatment Day 1  (Thursday 2/6/25)   End of Consult   Education Provided Yes   Patient Position at End of Consult Bed/Chair alarm activated;All needs within reach   Nurse Communication Nurse aware of consult   Licensure   [place] License Number  Kimberlee Sanders, OTR/L          The patient's raw score on the AM-PAC Daily Activity Inpatient Short Form is 17. A raw score of less than 19 suggests the patient may benefit from discharge to post-acute rehabilitation services. Please refer to the recommendation of the Occupational Therapist for safe discharge planning.      Pt goals to be met by 2/16/25 to max I w/ ADLs and improve engagement to return home to [place] w/ decreased pain includes:    -Pt will complete bed mobility supine <> sit w/ mod I in preparation for ADLs    -Pt will demonstrate improved activity / sitting tolerance OOB in chair for all meals    -Pt will complete functional transfers to bed, chair and toilet using LRAD, DME as needed w/ mod I    -Pt will demonstrate improved functional standing tolerance for at least 5 minutes using LRAD w/ at least fair balance while engaged in grooming in stance w/ S to max I w/ ADLs and improve engagement    -Pt will demonstrate good attention and participation in ongoing eval of functional mobility using LRAD as needed to max I w/ ADLs, assist in DC planning    -Pt will demonstrate improved AMPAC score to at least 21 to max I w/ ADLs, assist in DC planning.     -Pt will complete LBD w/ min A using LHAE as needed    -Pt will complete UBD w/ mod I for + time    -Pt will demonstrate good attention and understanding spinal  precautions during ADLs to assist w/ symptom mgmt and minimize burden of care      Kimberlee Sanders, OTR/L  [id number]  [id number]

## 2025-02-06 NOTE — ASSESSMENT & PLAN NOTE
Patient presented with Acute on chronic Lower back pain with radiation down to the legs.  Patient noticed  acute on chronic worsening pain in his  lower back about 1-2 weeks ago . Has associated bilateral posterior Thighs and knees radicular symptoms and spasms behinfd his kees, L>R side, occasional paresthesia, and weakness.  Reports ambulatory dysfunction with difficulty standing and walking due to severe pain in the legs.    Imagings:     Lumbar spine MRI shows Multilevel degenerative discogenic disease, most notably at L4-L5 where there is a central and left central disc protrusion resulting in severe spinal stenosis and compression of the nerve roots of the cauda equina. Surgical consultation is recommended. Also additional DJD discogenic disease at L5-S1 with endplate osteophytic ridging and facet arthropathy resulting in right greater than left foraminal stenosis and bilateral subarticular zone encroachment. Correlate for right greater than left L5 and bilateral S1 radiculitis.          Plan:  Continue neuromonitoring, assess for red flags  Pain control: Recommend multimodal pain meds  DVT ppx: SCDs bilat legs, okay with DVT ppx  Activity: As tolerated  PT/OT evaluation & treatment  Brace: None  Medical Mx: Per primary team  Patient reported continuous spasmodic bilateral knees, L>R posterior knees, difficulty walking, no red flags ( B/B dysfunction or saddle anaesthesia). Patient's A1C=8.4% and has recent left medial foot diabetic foot wound debridement still healing with stitches to be removed yet. Given patient's high risk for wound infection in the setting of high A1c and also undetermined status of his left foot wound, no emergency procedure is indicated. Discussed with Attending and agreed with OP follow up. Continue pain control, consider APS eval and pain meds adjustment. Continue PT/OT. NSX will see the patient from the periphery during his Hospital stay. Call with questions or concerns.

## 2025-02-06 NOTE — ASSESSMENT & PLAN NOTE
Lab Results   Component Value Date    EGFR 46 02/06/2025    EGFR 48 02/04/2025    EGFR 49 02/03/2025    CREATININE 1.42 (H) 02/06/2025    CREATININE 1.38 (H) 02/04/2025    CREATININE 1.36 (H) 02/03/2025   Mx per primary team

## 2025-02-06 NOTE — PLAN OF CARE
Problem: OCCUPATIONAL THERAPY ADULT  Goal: Performs self-care activities at highest level of function for planned discharge setting.  See evaluation for individualized goals.  Description: Treatment Interventions: ADL retraining, Functional transfer training, UE strengthening/ROM, Endurance training, Patient/family training, Equipment evaluation/education, Compensatory technique education, Continued evaluation, Energy conservation, Activityengagement          See flowsheet documentation for full assessment, interventions and recommendations.   Note: Limitation: Decreased ADL status, Decreased UE ROM, Decreased UE strength, Decreased endurance, Decreased self-care trans, Decreased high-level ADLs (impaired pain, activity tolerance, sitting tolerance, standing tolerance, LE ROM /strength, forward functional reach)     Assessment: Pt is a 78yo male admitted to [facility] on 2/4/25 from [facility] due to ongoing pain. Neurosurgery consulted and recommend conservative mgmt at this time, and neuro monitoring. Significant PMH impacting his occupational performance includes DM II, HTN, Recent L mid foot wound debridement w/ podiatry, chronic low back pain w/ B LE radiculopathy / neurogenic claudication, Gout, L TKA. Pt reports living w/ wife in 1 SH at baseline w/ few ARTURO. Pt reports I w/ ADL/ IADL at true baseline using cane but use of RW prior to admission. Upon eval, pt alert and generally oriented. Able to follow directions during ADLs w/ encouragement to challenge activity tolerance. Pt demonstrated limited UE AROM at shoulders and reports chronic / premobid shoulder deficits. Pt required mod A to complete bed mobility supine to sit via log roll, min <> mod A sit to stand from elevated bed height 2X, max A LBD, S UBD, S grooming while seated. Pt unable to tolerate standing to complete functional transfers or engage in functional mobility due to pain. Pt is completing ADLs below baseline level of I and would benefit from OT in  acute care to max I w/ ADLs, achieve highest level of function. Recommend level II rehab resource intensity when medically stable for discharge from acute care. Will continue to follow     Rehab Resource Intensity Level, OT: II (Moderate Resource Intensity)

## 2025-02-06 NOTE — QUICK NOTE
I tried to reach out to patient's spouse, Karolyn on her cellphone. She was not answering and I was unable to leave voice mail. Will call her tomorrow morning to discuss NSX plan for Mr Stewart Flores Lumbar stenosis.

## 2025-02-06 NOTE — ASSESSMENT & PLAN NOTE
Patient with history of chronic lower back pain with bilateral LE radiculopathy and neurogenic claudication presented to [facility] with worsening discomfort and difficulty ambulation over the past 1 to 2 weeks  Evaluated by neurosurgery here who is recommending medical management with analgesia and therapy  Continue close neuro-monitoring  Reviewed MRI which displays disc protrusion at L4-L5 causing severe spinal stenosis and compression of the nerve roots of cauda equina/ degenerative disc disease, right > left radiculitis  Continue multimodal pain regimen with Tylenol, gabapentin, lidocaine patch, as needed oxycodone, robaxin  Placed on bowel regimen to avoid opioid-induced constipation, closely monitor intake and output/urinary retention protocol

## 2025-02-07 ENCOUNTER — TELEPHONE (OUTPATIENT)
Dept: NEUROSURGERY | Facility: CLINIC | Age: 80
End: 2025-02-07

## 2025-02-07 VITALS
BODY MASS INDEX: 35.36 KG/M2 | SYSTOLIC BLOOD PRESSURE: 149 MMHG | TEMPERATURE: 98.5 F | WEIGHT: 232.59 LBS | DIASTOLIC BLOOD PRESSURE: 71 MMHG | RESPIRATION RATE: 14 BRPM | HEART RATE: 75 BPM | OXYGEN SATURATION: 96 %

## 2025-02-07 LAB
ALBUMIN SERPL BCG-MCNC: 3.9 G/DL (ref 3.5–5)
ALP SERPL-CCNC: 56 U/L (ref 34–104)
ALT SERPL W P-5'-P-CCNC: 12 U/L (ref 7–52)
ANION GAP SERPL CALCULATED.3IONS-SCNC: 9 MMOL/L (ref 4–13)
AST SERPL W P-5'-P-CCNC: 11 U/L (ref 13–39)
BILIRUB SERPL-MCNC: 0.54 MG/DL (ref 0.2–1)
BUN SERPL-MCNC: 28 MG/DL (ref 5–25)
CALCIUM SERPL-MCNC: 9.2 MG/DL (ref 8.4–10.2)
CHLORIDE SERPL-SCNC: 107 MMOL/L (ref 96–108)
CO2 SERPL-SCNC: 23 MMOL/L (ref 21–32)
CREAT SERPL-MCNC: 1.56 MG/DL (ref 0.6–1.3)
ERYTHROCYTE [DISTWIDTH] IN BLOOD BY AUTOMATED COUNT: 13.9 % (ref 11.6–15.1)
GFR SERPL CREATININE-BSD FRML MDRD: 41 ML/MIN/1.73SQ M
GLUCOSE SERPL-MCNC: 153 MG/DL (ref 65–140)
GLUCOSE SERPL-MCNC: 154 MG/DL (ref 65–140)
GLUCOSE SERPL-MCNC: 171 MG/DL (ref 65–140)
GLUCOSE SERPL-MCNC: 240 MG/DL (ref 65–140)
GLUCOSE SERPL-MCNC: 241 MG/DL (ref 65–140)
GLUCOSE SERPL-MCNC: 274 MG/DL (ref 65–140)
HCT VFR BLD AUTO: 41.1 % (ref 36.5–49.3)
HGB BLD-MCNC: 14 G/DL (ref 12–17)
MAGNESIUM SERPL-MCNC: 2.2 MG/DL (ref 1.9–2.7)
MCH RBC QN AUTO: 31.7 PG (ref 26.8–34.3)
MCHC RBC AUTO-ENTMCNC: 34.1 G/DL (ref 31.4–37.4)
MCV RBC AUTO: 93 FL (ref 82–98)
PLATELET # BLD AUTO: 197 THOUSANDS/UL (ref 149–390)
PMV BLD AUTO: 9.9 FL (ref 8.9–12.7)
POTASSIUM SERPL-SCNC: 3.6 MMOL/L (ref 3.5–5.3)
PROT SERPL-MCNC: 6.5 G/DL (ref 6.4–8.4)
RBC # BLD AUTO: 4.41 MILLION/UL (ref 3.88–5.62)
SODIUM SERPL-SCNC: 139 MMOL/L (ref 135–147)
WBC # BLD AUTO: 10.32 THOUSAND/UL (ref 4.31–10.16)

## 2025-02-07 PROCEDURE — 85027 COMPLETE CBC AUTOMATED: CPT | Performed by: STUDENT IN AN ORGANIZED HEALTH CARE EDUCATION/TRAINING PROGRAM

## 2025-02-07 PROCEDURE — 82948 REAGENT STRIP/BLOOD GLUCOSE: CPT

## 2025-02-07 PROCEDURE — 99239 HOSP IP/OBS DSCHRG MGMT >30: CPT | Performed by: PHYSICIAN ASSISTANT

## 2025-02-07 PROCEDURE — 80053 COMPREHEN METABOLIC PANEL: CPT

## 2025-02-07 PROCEDURE — 83735 ASSAY OF MAGNESIUM: CPT | Performed by: STUDENT IN AN ORGANIZED HEALTH CARE EDUCATION/TRAINING PROGRAM

## 2025-02-07 RX ORDER — OXYCODONE HYDROCHLORIDE 5 MG/1
5 TABLET ORAL EVERY 6 HOURS PRN
Qty: 10 TABLET | Refills: 0 | Status: SHIPPED | OUTPATIENT
Start: 2025-02-07 | End: 2025-02-17

## 2025-02-07 RX ORDER — METHYLPREDNISOLONE 4 MG/1
TABLET ORAL
Start: 2025-02-07

## 2025-02-07 RX ORDER — METHOCARBAMOL 750 MG/1
750 TABLET, FILM COATED ORAL 3 TIMES DAILY
Start: 2025-02-07

## 2025-02-07 RX ORDER — METHYLPREDNISOLONE 4 MG/1
12 TABLET ORAL DAILY
Status: DISCONTINUED | OUTPATIENT
Start: 2025-02-10 | End: 2025-02-07 | Stop reason: HOSPADM

## 2025-02-07 RX ORDER — LIDOCAINE 50 MG/G
1 PATCH TOPICAL DAILY
Start: 2025-02-08

## 2025-02-07 RX ORDER — EPLERENONE 25 MG/1
25 TABLET ORAL DAILY
Start: 2025-02-08

## 2025-02-07 RX ORDER — METHYLPREDNISOLONE 4 MG/1
4 TABLET ORAL DAILY
Status: DISCONTINUED | OUTPATIENT
Start: 2025-02-12 | End: 2025-02-07 | Stop reason: HOSPADM

## 2025-02-07 RX ORDER — SACCHAROMYCES BOULARDII 250 MG
250 CAPSULE ORAL 2 TIMES DAILY
Start: 2025-02-07

## 2025-02-07 RX ORDER — METHYLPREDNISOLONE 4 MG/1
8 TABLET ORAL DAILY
Status: DISCONTINUED | OUTPATIENT
Start: 2025-02-11 | End: 2025-02-07 | Stop reason: HOSPADM

## 2025-02-07 RX ORDER — POLYETHYLENE GLYCOL 3350 17 G/17G
17 POWDER, FOR SOLUTION ORAL DAILY PRN
Start: 2025-02-07

## 2025-02-07 RX ORDER — GABAPENTIN 300 MG/1
300 CAPSULE ORAL 3 TIMES DAILY
Start: 2025-02-07

## 2025-02-07 RX ORDER — OXYCODONE HYDROCHLORIDE 15 MG/1
7.5 TABLET ORAL EVERY 6 HOURS PRN
Start: 2025-02-07 | End: 2025-02-17

## 2025-02-07 RX ORDER — METHYLPREDNISOLONE 4 MG/1
20 TABLET ORAL DAILY
Status: DISCONTINUED | OUTPATIENT
Start: 2025-02-08 | End: 2025-02-07 | Stop reason: HOSPADM

## 2025-02-07 RX ORDER — METHYLPREDNISOLONE 4 MG/1
16 TABLET ORAL DAILY
Status: DISCONTINUED | OUTPATIENT
Start: 2025-02-09 | End: 2025-02-07 | Stop reason: HOSPADM

## 2025-02-07 RX ORDER — AMOXICILLIN 250 MG
2 CAPSULE ORAL
Start: 2025-02-07

## 2025-02-07 RX ORDER — ACETAMINOPHEN 325 MG/1
975 TABLET ORAL EVERY 8 HOURS SCHEDULED
Start: 2025-02-07

## 2025-02-07 RX ORDER — METHYLPREDNISOLONE 4 MG/1
24 TABLET ORAL DAILY
Status: COMPLETED | OUTPATIENT
Start: 2025-02-07 | End: 2025-02-07

## 2025-02-07 RX ADMIN — EPLERENONE 25 MG: 25 TABLET, FILM COATED ORAL at 09:24

## 2025-02-07 RX ADMIN — INSULIN LISPRO 1 UNITS: 100 INJECTION, SOLUTION INTRAVENOUS; SUBCUTANEOUS at 09:30

## 2025-02-07 RX ADMIN — DICLOFENAC SODIUM 2 G: 10 GEL TOPICAL at 11:32

## 2025-02-07 RX ADMIN — ACETAMINOPHEN 975 MG: 325 TABLET, FILM COATED ORAL at 05:52

## 2025-02-07 RX ADMIN — ALLOPURINOL 100 MG: 100 TABLET ORAL at 09:24

## 2025-02-07 RX ADMIN — INSULIN LISPRO 3 UNITS: 100 INJECTION, SOLUTION INTRAVENOUS; SUBCUTANEOUS at 17:04

## 2025-02-07 RX ADMIN — OMEGA-3 FATTY ACIDS CAP 1000 MG 1000 MG: 1000 CAP at 17:01

## 2025-02-07 RX ADMIN — INSULIN LISPRO 3 UNITS: 100 INJECTION, SOLUTION INTRAVENOUS; SUBCUTANEOUS at 12:04

## 2025-02-07 RX ADMIN — INSULIN ASPART 40 UNITS: 100 INJECTION, SUSPENSION SUBCUTANEOUS at 09:30

## 2025-02-07 RX ADMIN — DICLOFENAC SODIUM 2 G: 10 GEL TOPICAL at 17:02

## 2025-02-07 RX ADMIN — PRAVASTATIN SODIUM 40 MG: 40 TABLET ORAL at 15:56

## 2025-02-07 RX ADMIN — GABAPENTIN 300 MG: 300 CAPSULE ORAL at 15:56

## 2025-02-07 RX ADMIN — LIDOCAINE 5% 1 PATCH: 700 PATCH TOPICAL at 09:25

## 2025-02-07 RX ADMIN — ACETAMINOPHEN 975 MG: 325 TABLET, FILM COATED ORAL at 15:56

## 2025-02-07 RX ADMIN — Medication 7.5 MG: at 18:56

## 2025-02-07 RX ADMIN — Medication 250 MG: at 09:24

## 2025-02-07 RX ADMIN — ALLOPURINOL 100 MG: 100 TABLET ORAL at 17:01

## 2025-02-07 RX ADMIN — GABAPENTIN 300 MG: 300 CAPSULE ORAL at 09:24

## 2025-02-07 RX ADMIN — ENOXAPARIN SODIUM 40 MG: 40 INJECTION SUBCUTANEOUS at 09:25

## 2025-02-07 RX ADMIN — Medication 250 MG: at 17:01

## 2025-02-07 RX ADMIN — METHOCARBAMOL TABLETS 750 MG: 750 TABLET, COATED ORAL at 09:24

## 2025-02-07 RX ADMIN — EMPAGLIFLOZIN 10 MG: 10 TABLET, FILM COATED ORAL at 15:56

## 2025-02-07 RX ADMIN — Medication 7.5 MG: at 09:25

## 2025-02-07 RX ADMIN — INSULIN ASPART 40 UNITS: 100 INJECTION, SUSPENSION SUBCUTANEOUS at 17:04

## 2025-02-07 RX ADMIN — LOSARTAN POTASSIUM 50 MG: 50 TABLET, FILM COATED ORAL at 09:24

## 2025-02-07 RX ADMIN — METHYLPREDNISOLONE 24 MG: 4 TABLET ORAL at 11:31

## 2025-02-07 RX ADMIN — METHOCARBAMOL TABLETS 750 MG: 750 TABLET, COATED ORAL at 15:56

## 2025-02-07 RX ADMIN — OMEGA-3 FATTY ACIDS CAP 1000 MG 1000 MG: 1000 CAP at 09:24

## 2025-02-07 RX ADMIN — ASPIRIN 81 MG: 81 TABLET ORAL at 09:24

## 2025-02-07 NOTE — ASSESSMENT & PLAN NOTE
Patient with history of chronic lower back pain with bilateral LE radiculopathy and neurogenic claudication, presented to [facility] with worsening discomfort and difficulty ambulation over the past 1 to 2 weeks  Evaluated by neurosurgery here who is recommending conservative management with medication and therapy  Reviewed MRI which displays disc protrusion at L4-L5 causing severe spinal stenosis and compression of the nerve roots of cauda equina/ degenerative disc disease, right > left radiculitis  Continue multimodal pain regimen with Tylenol, gabapentin, lidocaine patch, as needed oxycodone, robaxin.   Add medrol dose pack  Placed on bowel regimen to avoid opioid-induced constipation

## 2025-02-07 NOTE — CASE MANAGEMENT
Case Management Discharge Planning Note    Patient name Stewart Flores  Location S /S -01 MRN [id number]  : 1945 Date 2025       Current Admission Date: 2025  Current Admission Diagnosis:Acute on chronic low back pain with bilateral sciatica   Patient Active Problem List    Diagnosis Date Noted Date Diagnosed    Hypokalemia 2025     Oxygen desaturation during sleep 2025     CKD (chronic kidney disease) stage 3, GFR 30-59 ml/min ([facility]) 2025     Status post left foot surgery 2025     Acute on chronic low back pain with bilateral sciatica 2025     S/P total knee arthroplasty, left 2025     Smoking - cigars daily 2025     Gout 2025     Diabetes mellitus, type 2 ([facility]) 2025     HTN (hypertension) 2025     Hyperlipidemia 2025     PAD (peripheral artery disease) ([facility]) 2017     Stage 3b chronic kidney disease ([facility]) 2014       LOS (days): 3  Geometric Mean LOS (GMLOS) (days): 2.9  Days to GMLOS:0     OBJECTIVE:  Risk of Unplanned Readmission Score: 22.11         Current admission status: Inpatient   Preferred Pharmacy:   [facility] [address] [address]  Phone: [phone] Fax: [phone]    [facility] - [address] [facility]  [place] [address]  Phone: [phone] Fax: [phone]    Primary Care Provider: Brady Reilly PA-C    [facility]  Secondary Insurance:     DISCHARGE DETAILS:    Discharge planning discussed with:: patient and patient's spouse Karolyn  Freedom of Choice: Yes  Comments - Freedom of Choice: CM d/c support notified this CM of STR Auth received for [facility]. CM forwarded auth info to facility via aidin. Per SLIM - pt medically stable for d/c today. [facility] confirmed able to accept pt today. Transport  referral placed to [facility], confirmed for 1800 via [employer] to [facility] today. All parties notified of same.  CM contacted family/caregiver?: Yes  Were Treatment Team discharge recommendations reviewed with patient/caregiver?: Yes  Did patient/caregiver verbalize understanding of patient care needs?: N/A- going to facility  Were patient/caregiver advised of the risks associated with not following Treatment Team discharge recommendations?: Yes    Contacts  Patient Contacts: Karolyn Flores (wife)  Relationship to Patient:: Family  Contact Method: Phone  Phone Number: [phone]  Reason/Outcome: Emergency Contact, Discharge Planning, Referral, Continuity of Care              Other Referral/Resources/Interventions Provided:  Interventions: Transportation  Referral Comments: Transport referral placed to [facility], confirmed for 1800 via [employer] to [facility] today. All parties notified of same.         Treatment Team Recommendation: Short Term Rehab  Discharge Destination Plan:: Short Term Rehab  Transport at Discharge : Stretcher van        Transported by (Company and Unit #): [employer]  ETA of Transport (Date): 02/07/25  ETA of Transport (Time): 1800           IMM reviewed with patient's caregiver, patient's caregiver agrees with discharge determination.  IMM Given (Date):: 02/07/25  IMM Given to:: Family  Family notified:: Karolyn (spouse)       Accepting Facility Name, City & State : [facility]  Receiving Facility/Agency Phone Number: [phone]  Facility/Agency Fax Number: [phone]

## 2025-02-07 NOTE — CASE MANAGEMENT
CO Support [place] received request for authorization from Care Manager.  Authorization request submitted for: [facility]  Facility Name: [facility] NPI: [id number]   Facility MD: Wander Faust NPI: [id number]  Authorization initiated by contacting insurance: [employer]   Via: [employer]   Clinicals submitted via Portal attachment   Pending Reference #: [id number]     [facility] has received APPROVED authorization.  Insurance: A[employer]   Auth obtained via portal.  Authorization received for: [facility]  Facility: [facility]   Authorization #: [id number]   Start of Care: 02/07  Next Review Date: 02/13  Submit next review to F#: [phone]    Care Manager notified: Heaven Hurley     Please reach out to CM for updates on any clinical information.     02/07 @ 125pm - Approval confirmed by rep Marie (P#: [phone]). Stated she will be the CC for the case. CM notified.

## 2025-02-07 NOTE — TELEPHONE ENCOUNTER
2/4/25-2/7/25: INPATIENT    4 wk HFU w/ SNPX GOLDBERG    3/21  9:00  CARLOS    Imaging: MRI LSPINE (1/31/25)    Per: JUNG      ----- Message -----  From: Jung Cummins PA-C  Sent: 2/7/2025   8:41 AM EST  To: Melissa Witt    Patient with Lumbar neurogenic claudication, follow up in 4 weeks, SNPx, spine surgeon

## 2025-02-07 NOTE — TREATMENT PLAN
I saw the patient this morning. He is doing better while in bed and at rest, but pain worse when he tries to stand up and walk. Pain inhibition weakness. Left foot dressed and wrapped with ace.    From NSX perspective,Given patient medical issues and recent left foot wound debridement,  no emergency procedure is indicated at this time. I talked to the patient and also updated his wife. Recommend OP follow up. In the mean time continue conservative Mx with pain meds, PT, control his sugar, his A1c needs to be less than 7% for ideal diabetic patient surgery to minimize risk of infection. NSX willl sign off. Call with questions or concerns.

## 2025-02-07 NOTE — DISCHARGE SUMMARY
Discharge Summary - Hospitalist   Name: Stewart Flores 79 y.o. male I MRN: [id number]  Unit/Bed#: S -01 I Date of Admission: 2/4/2025   Date of Service: 2/7/2025 I Hospital Day: 3     Assessment & Plan  Acute on chronic low back pain with bilateral sciatica  Patient with history of chronic lower back pain with bilateral LE radiculopathy and neurogenic claudication, presented to [facility] with worsening discomfort and difficulty ambulation over the past 1 to 2 weeks  Evaluated by neurosurgery here who is recommending conservative management with medication and therapy  Reviewed MRI which displays disc protrusion at L4-L5 causing severe spinal stenosis and compression of the nerve roots of cauda equina/ degenerative disc disease, right > left radiculitis  Continue multimodal pain regimen with Tylenol, gabapentin, lidocaine patch, as needed oxycodone, robaxin.   Add medrol dose pack  Placed on bowel regimen to avoid opioid-induced constipation  Diabetes mellitus, type 2 (HCC)  Lab Results   Component Value Date    HGBA1C 8.2 (H) 12/30/2024     Recent Labs     02/06/25  2135 02/07/25  0747 02/07/25  0921 02/07/25  1109   POCGLU 143* 171* 153* 240*     Home regimen 75/25 at home doses  Continue Jardiance and resume glipizide  Diabetic diet  HTN (hypertension)  Blood pressure well controlled, continue PTA losartan 50 mg, Toprol 150 mg, eplerenone  Status post left foot surgery  With left foot Charcot neuroarthropathy, status post left medial foot saucerization  Seen by podiatry 1/31 at [place], reviewed wound care recommendations - continue here  No local signs of infection per podiatry  Plan for outpatient post operative follow up  WBAT surgical shoe  CKD (chronic kidney disease) stage 3, GFR 30-59 ml/min ([facility])  Lab Results   Component Value Date    EGFR 41 02/07/2025    EGFR 46 02/06/2025    EGFR 48 02/04/2025    CREATININE 1.56 (H) 02/07/2025    CREATININE 1.42 (H) 02/06/2025    CREATININE 1.38 (H)  02/04/2025     [facility] baseline creatinine appears around 1.4-1.8  Metabolic acidosis resolved after sodium bicarbonate infusion, patient tolerating diet and urinating without difficulties  No evidence of retention  Hypokalemia  Follows nephrology for this problem, he is on eplerenone       Medical Problems       Resolved Problems  Date Reviewed: 2/7/2025   None       Discharging Physician / Practitioner: Tisha Holt PA-C  PCP: Brady Reilly PA-C  Admission Date:   Admission Orders (From admission, onward)       Ordered        02/04/25 1823  INPATIENT ADMISSION  Once                          Discharge Date: 02/07/25    Consultations During Hospital Stay:  Neurosurgery    Procedures Performed:   None     Significant Findings / Test Results:   MRI lumbar spine as above    Incidental Findings:   none     Test Results Pending at Discharge (will require follow up):   none     Outpatient Tests Requested:  bmp    Complications:  none    Reason for Admission: back pain    Hospital Course:   Stewart Flores is a 79 y.o. male patient with underlying history of diabetes and CKD 3 as well recent charcot foot surgery who originally presented to the hospital on 2/4/2025 due to low back pain and ambulatory dysfunction.  MRI showed evidence of right greater than left foraminal stenosis with evidence of L5 and bilateral S1 radiculitis.  They recommended transfer from [place] to [place] to be evaluated by neurosurgery.  Their recommendations were for conservative treatment with medication management and physical therapy.  Patient was placed on multimodal regimen and sent to STR    Please see above list of diagnoses and related plan for additional information.     Condition at Discharge: stable    Discharge Day Visit / Exam:   Subjective: Patient describes his pain level as only 2 out of 10 at rest but notes that his pain intermittently goes up higher with exertion but somewhat unpredictably.   He also sometimes has nerve pain in his thigh.  Having a lot of discomfort in his shoulders.  Had a good bowel movement.  Vitals: Blood Pressure: 149/71 (02/07/25 1418)  Pulse: 75 (02/07/25 1418)  Temperature: 98.5 °F (36.9 °C) (02/07/25 1418)  Temp Source: Oral (02/07/25 0737)  Respirations: 16 (02/07/25 0737)  Weight - Scale: 105 kg (232 lb 9.4 oz) (02/06/25 1854)  SpO2: 96 % (02/07/25 1418)  Physical Exam  Vitals reviewed.   Constitutional:       General: He is not in acute distress.     Appearance: He is obese. He is not ill-appearing, toxic-appearing or diaphoretic.   Eyes:      General: No scleral icterus.        Right eye: No discharge.         Left eye: No discharge.      Conjunctiva/sclera: Conjunctivae normal.   Cardiovascular:      Rate and Rhythm: Normal rate and regular rhythm.      Heart sounds: No murmur heard.  Pulmonary:      Effort: No respiratory distress.      Breath sounds: No stridor. No wheezing, rhonchi or rales.   Abdominal:      General: There is no distension.      Palpations: Abdomen is soft.      Tenderness: There is no abdominal tenderness. There is no guarding.   Musculoskeletal:         General: No swelling, tenderness, deformity or signs of injury.      Right lower leg: No edema.      Left lower leg: No edema.   Skin:     General: Skin is warm and dry.      Coloration: Skin is not jaundiced or pale.      Findings: No bruising, erythema, lesion or rash.   Neurological:      Mental Status: He is alert. Mental status is at baseline.      Comments: Awake alert interactive, good historian   Psychiatric:         Mood and Affect: Mood normal.          Discussion with Family: Updated  (wife) via phone.    Discharge instructions/Information to patient and family:   See after visit summary for information provided to patient and family.      Provisions for Follow-Up Care:  See after visit summary for information related to follow-up care and any pertinent home health orders.       Mobility at time of Discharge:   Basic Mobility Inpatient Raw Score: 11  JH-HLM Goal: 4: Move to chair/commode  JH-HLM Achieved: 2: Bed activities/Dependent transfer  HLM Goal NOT achieved. Continue to encourage mobility in post discharge setting.     Disposition:   STR    Planned Readmission: none    Discharge Medications:  See after visit summary for reconciled discharge medications provided to patient and/or family.      Administrative Statements   Discharge Statement:  I have spent a total time of 35 minutes in caring for this patient on the day of the visit/encounter.  Case discussed with nursing and case management.    **Please Note: This note may have been constructed using a voice recognition system**

## 2025-02-07 NOTE — CASE MANAGEMENT
Case Management Discharge Planning Note    Patient name Stewart Flores  Location S /S -01 MRN [id number]  : 1945 Date 2025       Current Admission Date: 2025  Current Admission Diagnosis:Acute on chronic low back pain with bilateral sciatica   Patient Active Problem List    Diagnosis Date Noted Date Diagnosed    Hypokalemia 2025     Oxygen desaturation during sleep 2025     CKD (chronic kidney disease) stage 3, GFR 30-59 ml/min ([facility]) 2025     Status post left foot surgery 2025     Acute on chronic low back pain with bilateral sciatica 2025     S/P total knee arthroplasty, left 2025     Smoking - cigars daily 2025     Gout 2025     Diabetes mellitus, type 2 ([facility]) 2025     HTN (hypertension) 2025     Hyperlipidemia 2025     PAD (peripheral artery disease) ([facility]) 2017     Stage 3b chronic kidney disease ([facility]) 2014       LOS (days): 3  Geometric Mean LOS (GMLOS) (days): 2.9  Days to GMLOS:0.1     OBJECTIVE:  Risk of Unplanned Readmission Score: 19.78         Current admission status: Inpatient   Preferred Pharmacy:   [facility] [address] [address]  Phone: [phone] Fax: [phone]    [facility] - [address] [facility]  [place] [address]  Phone: [phone] Fax: [phone]    Primary Care Provider: Brady Reilly PA-C    Primary Insurance: SHERRI YANG  Secondary Insurance:     DISCHARGE DETAILS:                                                                                                               Facility Insurance Auth Number: [id number]

## 2025-02-07 NOTE — ASSESSMENT & PLAN NOTE
Lab Results   Component Value Date    HGBA1C 8.2 (H) 12/30/2024     Recent Labs     02/06/25  2135 02/07/25  0747 02/07/25  0921 02/07/25  1109   POCGLU 143* 171* 153* 240*     Home regimen 75/25 at home doses  Continue Jardiance and resume glipizide  Diabetic diet

## 2025-02-07 NOTE — CASE MANAGEMENT
Case Management Discharge Planning Note    Patient name Stewart Flores  Location S /S -01 MRN [id number]  : 1945 Date 2025       Current Admission Date: 2025  Current Admission Diagnosis:Acute on chronic low back pain with bilateral sciatica   Patient Active Problem List    Diagnosis Date Noted Date Diagnosed    Hypokalemia 2025     Oxygen desaturation during sleep 2025     CKD (chronic kidney disease) stage 3, GFR 30-59 ml/min ([facility]) 2025     Status post left foot surgery 2025     Acute on chronic low back pain with bilateral sciatica 2025     S/P total knee arthroplasty, left 2025     Smoking - cigars daily 2025     Gout 2025     Diabetes mellitus, type 2 ([facility]) 2025     HTN (hypertension) 2025     Hyperlipidemia 2025     PAD (peripheral artery disease) ([facility]) 2017     Stage 3b chronic kidney disease ([facility]) 2014       LOS (days): 3  Geometric Mean LOS (GMLOS) (days): 2.9  Days to GMLOS:0     OBJECTIVE:  Risk of Unplanned Readmission Score: 19.78         Current admission status: Inpatient   [facility]   [facility] [address] [address]  Phone: [phone] Fax: [phone]    [facility] - [address] [facility]  [place] [address]  Phone: [phone] Fax: [phone]    Primary Care Provider: Brady Reilly PA-C    [facility]  Secondary Insurance:     DISCHARGE DETAILS:    Discharge planning discussed with:: patient's spouse Karolyn  Freedom of Choice: Yes  Comments - Freedom of Choice: CM f/u with pt's spouse Karolyn via phone re: STR option available from the 2 referrals sent. Wife relayed choice to move forward with [facility]. STR auth tasked to CM d/c support.  CM contacted family/caregiver?: Yes  Were Treatment Team  discharge recommendations reviewed with patient/caregiver?: Yes  Did patient/caregiver verbalize understanding of patient care needs?: N/A- going to facility  Were patient/caregiver advised of the risks associated with not following Treatment Team discharge recommendations?: Yes    Contacts  Patient Contacts: Karolyn Flores (wife)  Relationship to Patient:: Family  Contact Method: Phone  Phone Number: [phone]  Reason/Outcome: Emergency Contact, Discharge Planning, Referral, Continuity of Care

## 2025-02-07 NOTE — ASSESSMENT & PLAN NOTE
Lab Results   Component Value Date    EGFR 41 02/07/2025    EGFR 46 02/06/2025    EGFR 48 02/04/2025    CREATININE 1.56 (H) 02/07/2025    CREATININE 1.42 (H) 02/06/2025    CREATININE 1.38 (H) 02/04/2025     Patient follows with [facility] nephrology, baseline creatinine appears around 1.4-1.8  Metabolic acidosis resolved after sodium bicarbonate infusion, patient tolerating diet and urinating without difficulties  No evidence of retention

## 2025-02-07 NOTE — ASSESSMENT & PLAN NOTE
With left foot Charcot neuroarthropathy, status post left medial foot saucerization  Seen by podiatry 1/31 at [place], reviewed wound care recommendations - continue here  No local signs of infection per podiatry  Plan for outpatient post operative follow up  WBAT surgical shoe

## 2025-02-10 ENCOUNTER — TELEPHONE (OUTPATIENT)
Age: 80
End: 2025-02-10

## 2025-02-10 NOTE — TELEPHONE ENCOUNTER
Sw wife, told her that Dr White would like to see PT in office. He is currently in [facility] and I told her that they usually coordinate transportation. I sched an appt she states she will then coordinate with them for the transportation. She was polite and thankful.

## 2025-02-10 NOTE — TELEPHONE ENCOUNTER
Caller: Patients spouse- Karolyn     Doctor: Dr. White    Reason for call: Patients spouse Karolyn is calling in worried about the stitches still being in. I read Dr's message verbatim and she stated that she was going to have someone at the facility reach out to the Dr to see if they might be able to remove the sutures.

## 2025-02-10 NOTE — UTILIZATION REVIEW
NOTIFICATION OF ADMISSION DISCHARGE   This is a Notification of Discharge from [facility]. Please be advised that this patient has been discharge from our facility. Below you will find the admission and discharge date and time including the patient’s disposition.   UTILIZATION REVIEW CONTACT:  Felicia Stallworth  Utilization   Network Utilization Review Department  Phone: [phone] x carefully listen to the prompts. All voicemails are confidential.  Email: [email or web address]     ADMISSION INFORMATION  PRESENTATION DATE: 2/4/2025  5:57 PM  OBERVATION ADMISSION DATE: N/A  INPATIENT ADMISSION DATE: 2/4/25  5:57 PM   DISCHARGE DATE: 2/7/2025  7:26 PM   [facility]    Network Utilization Review Department  ATTENTION: Please call with any questions or concerns to [phone] and carefully listen to the prompts so that you are directed to the right person. All voicemails are confidential.   For Discharge needs, contact Care Management DC Support Team at [phone] opt. 2  Send all requests for admission clinical reviews, approved or denied determinations and any other requests to dedicated fax number below belonging to the campus where the patient is receiving treatment. List of dedicated fax numbers for the Facilities:  FACILITY NAME UR FAX NUMBER   ADMISSION DENIALS (Administrative/Medical Necessity) [phone]   DISCHARGE SUPPORT TEAM ([facility]) [phone]   PARENT CHILD HEALTH (Maternity/NICU/Pediatrics) [phone]   [facility] [phone]   [facility] [phone]   [facility] [phone]   [facility] [phone]   [facility] [phone]   [facility] [phone]   [facility] [phone]   [facility] [phone]    [facility] [phone]   [facility] [phone]   [facility] [phone]   [facility] [phone]

## 2025-02-12 ENCOUNTER — OFFICE VISIT (OUTPATIENT)
Age: 80
End: 2025-02-12

## 2025-02-12 VITALS — WEIGHT: 232 LBS | HEIGHT: 68 IN | BODY MASS INDEX: 35.16 KG/M2

## 2025-02-12 DIAGNOSIS — Z98.890 POST-OPERATIVE STATE: Primary | ICD-10-CM

## 2025-02-12 DIAGNOSIS — M14.672 CHARCOT JOINT OF LEFT FOOT: ICD-10-CM

## 2025-02-12 PROCEDURE — 99024 POSTOP FOLLOW-UP VISIT: CPT | Performed by: STUDENT IN AN ORGANIZED HEALTH CARE EDUCATION/TRAINING PROGRAM

## 2025-02-12 NOTE — PROGRESS NOTES
"[facility] Podiatric Medicine and Surgery Office Visit    ASSESSMENT     Diagnoses and all orders for this visit:    Post-operative state    Charcot joint of left foot         Problem List Items Addressed This Visit    None  Visit Diagnoses         Post-operative state    -  Primary      Charcot joint of left foot                  PLAN  Patient is stable post-op  Discussed compliance with weight bearing instructions, incision care, and rest.   Sutures removed today without incident  Patient may begin weightbearing as tolerated bilaterally in his diabetic sneakers with custom molded inserts  Return to clinic 4 weeks    SUBJECTIVE    Chief Complaint:  Status post left foot exostectomy/saucerization     Patient ID: Stewart Flores     2/12/2025: Stewart presents today for follow-up regarding a left foot exostectomy/saucerization.  He states that his foot is not painful and that he has been ambulating in his surgical shoe at all times.  He is excited to get back into sneakers.  He is currently in a postacute rehab facility given his severe back pain and is making improvements in this area as well.  He denies any systemic signs of infection since his last visit.        The following portions of the patient's history were reviewed and updated as appropriate: allergies, current medications, past family history, past medical history, past social history, past surgical history and problem list.    Review of Systems   Constitutional: Negative.    Respiratory: Negative.     Cardiovascular: Negative.    Gastrointestinal: Negative.    Genitourinary: Negative.    Musculoskeletal: Negative.    Skin: Negative.          OBJECTIVE      Ht 5' 8\" (1.727 m)   Wt 105 kg (232 lb)   BMI 35.28 kg/m²        Physical Exam  Constitutional:       Appearance: Normal appearance. He is not ill-appearing or diaphoretic.   HENT:      Head: Normocephalic and atraumatic.   Eyes:      General:         Right eye: No discharge.         Left eye: No " discharge.   Pulmonary:      Effort: Pulmonary effort is normal. No respiratory distress.   Musculoskeletal:      Comments: Left foot deformity noted with hallux abductovalgus, pes planus/slight rocker-bottom deformity noted with plantar medial prominence at the level of the midfoot secondary to Charcot neuroarthropathy.  On standing exam it is noted that the forefoot has an severe abduction as well as dorsiflexion compared to the rear foot at the left foot.  The ankle joint remains rectus as well as the subtalar joint.   Skin:     Capillary Refill: Capillary refill takes less than 2 seconds.      Comments: Left foot wound remains fully healed at this time without any local signs of infection or open areas   Neurological:      Mental Status: He is alert.      Sensory: Sensory deficit (7/10 locations felt with monofilament of the left foot) present.   Psychiatric:         Mood and Affect: Mood normal.

## 2025-03-03 ENCOUNTER — TELEPHONE (OUTPATIENT)
Age: 80
End: 2025-03-03

## 2025-03-03 NOTE — TELEPHONE ENCOUNTER
Harley Parr, per Dr White pushing back PO appt is fine. We schedule an appt for 1 month from now. She was thankful.

## 2025-03-03 NOTE — TELEPHONE ENCOUNTER
Caller: Karolyn Flores    Doctor and/or Office: Dr. White/[place]    #: [phone]    [facility]: Surgery Calling on behalf of patient Stewart. They would like to know if they can push his post op appt back one month? They had to cancel for next week as they cannot make it, but before rescheduling it they wanted to know if it can be pushed back? Please return call. Thank you

## 2025-03-17 ENCOUNTER — TELEPHONE (OUTPATIENT)
Age: 80
End: 2025-03-17

## 2025-03-21 ENCOUNTER — OFFICE VISIT (OUTPATIENT)
Dept: NEUROSURGERY | Facility: CLINIC | Age: 80
End: 2025-03-21
Payer: COMMERCIAL

## 2025-03-21 VITALS
BODY MASS INDEX: 35.28 KG/M2 | TEMPERATURE: 98.4 F | HEIGHT: 68 IN | HEART RATE: 70 BPM | DIASTOLIC BLOOD PRESSURE: 72 MMHG | SYSTOLIC BLOOD PRESSURE: 128 MMHG

## 2025-03-21 DIAGNOSIS — M54.42 ACUTE BILATERAL LOW BACK PAIN WITH BILATERAL SCIATICA: Primary | ICD-10-CM

## 2025-03-21 DIAGNOSIS — M54.41 ACUTE BILATERAL LOW BACK PAIN WITH BILATERAL SCIATICA: Primary | ICD-10-CM

## 2025-03-21 DIAGNOSIS — Z79.4 TYPE 2 DIABETES MELLITUS WITH OTHER SPECIFIED COMPLICATION, WITH LONG-TERM CURRENT USE OF INSULIN (HCC): ICD-10-CM

## 2025-03-21 DIAGNOSIS — E11.69 TYPE 2 DIABETES MELLITUS WITH OTHER SPECIFIED COMPLICATION, WITH LONG-TERM CURRENT USE OF INSULIN (HCC): ICD-10-CM

## 2025-03-21 DIAGNOSIS — M48.062 SPINAL STENOSIS OF LUMBAR REGION WITH NEUROGENIC CLAUDICATION: ICD-10-CM

## 2025-03-21 DIAGNOSIS — E66.01 SEVERE OBESITY (HCC): ICD-10-CM

## 2025-03-21 PROCEDURE — 99215 OFFICE O/P EST HI 40 MIN: CPT | Performed by: PHYSICIAN ASSISTANT

## 2025-03-21 NOTE — ASSESSMENT & PLAN NOTE
BMI 35   Discussed risk associated with surgery at elevated BMI   Counseled on healthy habits including dietary adjustments

## 2025-03-21 NOTE — PROGRESS NOTES
Name: Stewart Flores      : 1945      MRN: [id number]  Encounter Provider: Craig Robert Goldberg, MD  Encounter Date: 3/21/2025   Encounter department: [facility]  :  Assessment & Plan  Spinal stenosis of lumbar region with neurogenic claudication  Patient returns to the outpatient neurosurgical office for follow-up regarding his previously identified lumbar stenosis with neurogenic claudication  Patient remains symptomatic at this time  Currently undergoing physical therapy after discharge from acute rehab    Imaging:   MRI lumbar spine 2025: Multilevel degenerative disease most notably at L4-5 where there is central and left central disc protrusions resulting in severe spinal stenosis and compression of the nerve roots.  Additional degenerative changes at L5-S1 resulting in right greater than left foraminal narrowing.    Plan:  Patient to monitor symptoms  Imaging was reviewed in room with patient and patient's wife.  Unfortunately at this time patient's diabetes does not make him an appropriate surgical candidate.  We discussed better glycemic control and recommend he follow-up with his established endocrinologist  Discussed risks associated with poor glucose control and neurosurgical intervention.   Patient has trialed injections in the past about 20 years ago which were very effective for him  Referral placed to pain management for evaluation and consideration of an injection.  If injection is not indicated can consider patient returning to the neurosurgical office  Patient does have severe central stenosis at L4-5 which is likely contributing to his neurogenic claudication symptoms  Patient can be a surgical candidate should he improve medically  Would continue with home therapy and ambulation as tolerated  Patient can follow-up after trial of additional conservative measures and improved glucose control.  This follow-up can then be coordinated with a spine  surgeon.   Encouraged to call with questions or concerns.          Severe obesity (HCC)  BMI 35   Discussed risk associated with surgery at elevated BMI   Counseled on healthy habits including dietary adjustments         Type 2 diabetes mellitus with other specified complication, with long-term current use of insulin (HCC)    Lab Results   Component Value Date    HGBA1C 8.2 (H) 12/30/2024     Patient with elevated A1C  Long standing history of diabetes  Reports average blood sugars at home are >200   For elective neurosurgical surgical intervention ideally would like patient under 7.5  Recommend follow up with established endocrinologist               History of Present Illness     Stewart Flores is a 79 y.o. male who presents To the outpatient neurosurgical office for hospital follow-up.  Patient was seen in the hospital approximately 6 weeks ago with acute phonic low back pain and ambulatory dysfunction.  Her symptoms have continued since that time he reports most of his pain is radiating down into the legs and occurs when he is ambulating.  He gets cramping sensations in the lower extremities in the calf area bilaterally.  He has had a recent foot surgery for chronic wound and Charcot foot which she states is healing well and is following with podiatry.  His symptoms are alleviated with rest and sitting.  When standing he reports only being able to ambulate about 40 to 50 feet before the onset of his pain and discomfort which requires him to then sit down.  Denies any significant numbness or tingling.  Does report some mild chronic neuropathy.    Review of Systems   Constitutional: Negative.    HENT: Negative.     Eyes: Negative.    Respiratory: Negative.     Cardiovascular: Negative.    Gastrointestinal: Negative.    Endocrine: Negative.    Genitourinary: Negative.    Musculoskeletal:  Positive for back pain and gait problem.        4wk HFU -- L/S MRI on Jan 2025  Telemed Cons 1/31 TG:       c/o 5/10 back  pain into b/l legs posteriorly and stops at the calf  +W on b/l legs and difficulty walking even w/  (< than 30 steps)   Denies any B/B  incontinence   [facility] in Feb 2025 x 2wks   Currently doing home PT   Meds: Gabapentin, Robaxin  On Aspirin/smoker (Cigars)   No previous garcia sx   Skin: Negative.    Allergic/Immunologic: Negative.    Neurological:  Positive for weakness.   Hematological: Negative.    Psychiatric/Behavioral: Negative.     All other systems reviewed and are negative.    I have personally reviewed the MA's review of systems and made changes as necessary.    Past Medical History   Past Medical History:   Diagnosis Date    Arthritis     Chronic kidney disease     Diabetes mellitus (HCC)     History of wound infection ?2013    RIGHT LOWER LEG. WAS + FOR STAPH INFECTION AT THAT TIME    Hypertension     Shoulder abrasion     right side after a fall in Feb 2018     Past Surgical History:   Procedure Laterality Date    BONE EXOSTOSIS EXCISION Left 1/21/2025    Procedure: EXCISION EXOSTOSIS/saucerization left foot;  Surgeon: Ollie White DPM;  Location: [facility];  Service: Podiatry    CHOLECYSTECTOMY      COLONOSCOPY      KNEE ARTHROPLASTY Left 2014    FL EXC B9 LESION MRGN XCP SK TG S/N/H/F/G 1.1-2.0CM Left 07/30/2018    Procedure: EXCISIONAL BIOPSY BENIGN NEOPLASM OF SKIN EXTREMITY;  Surgeon: Julio Cesar Trejo Jr., DPM;  Location: [facility];  Service: Podiatry    STEROID INJECTION SHOULDER Right     8 CERIVAL SPINE INJECTIONS    TONSILLECTOMY       No family history on file.  he reports that he has been smoking cigars. He has never used smokeless tobacco. He reports current alcohol use of about 5.0 standard drinks of alcohol per week. He reports that he does not use drugs.  Current Outpatient Medications   Medication Instructions    acetaminophen (TYLENOL) 975 mg, Oral, Every 8 hours scheduled    allopurinol (ZYLOPRIM) 100 mg, 2 times daily    aspirin (ECOTRIN LOW STRENGTH) 81 mg, Daily     "Continuous Glucose Sensor (FreeStyle [place] 2 Sensor) MISC USE 1 SENSOR EVERY 14 DAYS    dapagliflozin (Farxiga) 5 MG TABS Daily    Diclofenac Sodium (VOLTAREN) 2 g, Topical, 4 times daily    eplerenone (INSPRA) 25 mg, Oral, Daily    gabapentin (NEURONTIN) 300 mg, Oral, 3 times daily    glipiZIDE (GLUCOTROL) 5 mg, Every morning    insulin lispro protamine-insulin lispro (HUMALOG 75/25) 60 Units, 2 times daily before meals    lidocaine (LIDODERM) 5 % 1 patch, Topical, Daily, Remove & Discard patch within 12 hours or as directed by MD    losartan (COZAAR) 50 mg, Daily    methocarbamol (ROBAXIN) 750 mg, Oral, 3 times daily    methylPREDNISolone 4 MG tablet therapy pack Use as directed on package    metoprolol succinate (TOPROL-XL) 150 mg, Daily at bedtime    omega-3-acid ethyl esters (LOVAZA) 1 g, 2 times daily    polyethylene glycol (MIRALAX) 17 g, Oral, Daily PRN    rosuvastatin (CRESTOR) 5 mg, Daily    saccharomyces boulardii (FLORASTOR) 250 mg, Oral, 2 times daily    senna-docusate sodium (SENOKOT S) 8.6-50 mg per tablet 2 tablets, Oral, Daily at bedtime    torsemide (DEMADEX) 20 mg, 2 times daily     Allergies   Allergen Reactions    Ibuprofen Other (See Comments)     To avoid due to kidney problems      Objective   /72   Pulse 70   Temp 98.4 °F (36.9 °C) (Temporal)   Ht 5' 8\" (1.727 m)   BMI 35.28 kg/m²     Physical Exam  Constitutional:       Appearance: Normal appearance.      Comments: Presents in wheelchair secondary to distance needed to ambulate to get to office   HENT:      Head: Normocephalic and atraumatic.   Eyes:      Extraocular Movements: Extraocular movements intact.   Pulmonary:      Effort: Pulmonary effort is normal.   Musculoskeletal:         General: Tenderness (mild focal pain overlying the L paraspinal area just above the L SI joint. no midline pain) present. Normal range of motion.   Skin:     General: Skin is warm and dry.   Neurological:      Mental Status: He is alert and " oriented to person, place, and time.      Cranial Nerves: No cranial nerve deficit.      Sensory: No sensory deficit.      Motor: No weakness.      Comments: Patient has no focal weakness. He is strong on individual strength testing. He continues to endorse trouble with ambulating distances. No sensory deficits in the LE at this time aside from some mild peripheral neuropathy      Radiology Results Review: I personally reviewed the following image studies in PACS and associated radiology reports: MRI spine. My interpretation of the radiology images/reports is: See above.    Administrative Statements   I have spent a total time of 45 minutes in caring for this patient on the day of the visit/encounter including Diagnostic results, Prognosis, Risks and benefits of tx options, Instructions for management, Patient and family education, Importance of tx compliance, Risk factor reductions, Impressions, Counseling / Coordination of care, Documenting in the medical record, Reviewing/placing orders in the medical record (including tests, medications, and/or procedures), Obtaining or reviewing history  , and Communicating with other healthcare professionals .

## 2025-03-21 NOTE — ASSESSMENT & PLAN NOTE
Lab Results   Component Value Date    HGBA1C 8.2 (H) 12/30/2024     Patient with elevated A1C  Long standing history of diabetes  Reports average blood sugars at home are >200   For elective neurosurgical surgical intervention ideally would like patient under 7.5  Recommend follow up with established endocrinologist

## 2025-03-21 NOTE — ASSESSMENT & PLAN NOTE
Patient returns to the outpatient neurosurgical office for follow-up regarding his previously identified lumbar stenosis with neurogenic claudication  Patient remains symptomatic at this time  Currently undergoing physical therapy after discharge from acute rehab    Imaging:   MRI lumbar spine 1/31/2025: Multilevel degenerative disease most notably at L4-5 where there is central and left central disc protrusions resulting in severe spinal stenosis and compression of the nerve roots.  Additional degenerative changes at L5-S1 resulting in right greater than left foraminal narrowing.    Plan:  Patient to monitor symptoms  Imaging was reviewed in room with patient and patient's wife.  Unfortunately at this time patient's diabetes does not make him an appropriate surgical candidate.  We discussed better glycemic control and recommend he follow-up with his established endocrinologist  Discussed risks associated with poor glucose control and neurosurgical intervention.   Patient has trialed injections in the past about 20 years ago which were very effective for him  Referral placed to pain management for evaluation and consideration of an injection.  If injection is not indicated can consider patient returning to the neurosurgical office  Patient does have severe central stenosis at L4-5 which is likely contributing to his neurogenic claudication symptoms  Patient can be a surgical candidate should he improve medically  Would continue with home therapy and ambulation as tolerated  Patient can follow-up after trial of additional conservative measures and improved glucose control.  This follow-up can then be coordinated with a spine surgeon.   Encouraged to call with questions or concerns.

## 2025-03-27 ENCOUNTER — CONSULT (OUTPATIENT)
Dept: PAIN MEDICINE | Facility: CLINIC | Age: 80
End: 2025-03-27
Payer: COMMERCIAL

## 2025-03-27 ENCOUNTER — TELEPHONE (OUTPATIENT)
Age: 80
End: 2025-03-27

## 2025-03-27 VITALS — BODY MASS INDEX: 33.8 KG/M2 | HEIGHT: 68 IN | WEIGHT: 223 LBS

## 2025-03-27 DIAGNOSIS — M48.062 SPINAL STENOSIS OF LUMBAR REGION WITH NEUROGENIC CLAUDICATION: Primary | ICD-10-CM

## 2025-03-27 DIAGNOSIS — M54.42 ACUTE BILATERAL LOW BACK PAIN WITH BILATERAL SCIATICA: ICD-10-CM

## 2025-03-27 DIAGNOSIS — M54.41 ACUTE BILATERAL LOW BACK PAIN WITH BILATERAL SCIATICA: ICD-10-CM

## 2025-03-27 DIAGNOSIS — E11.69 TYPE 2 DIABETES MELLITUS WITH OTHER SPECIFIED COMPLICATION, WITH LONG-TERM CURRENT USE OF INSULIN (HCC): ICD-10-CM

## 2025-03-27 DIAGNOSIS — E66.01 SEVERE OBESITY (HCC): ICD-10-CM

## 2025-03-27 DIAGNOSIS — Z79.4 TYPE 2 DIABETES MELLITUS WITH OTHER SPECIFIED COMPLICATION, WITH LONG-TERM CURRENT USE OF INSULIN (HCC): ICD-10-CM

## 2025-03-27 DIAGNOSIS — F41.9 ANXIETY: ICD-10-CM

## 2025-03-27 PROCEDURE — 99204 OFFICE O/P NEW MOD 45 MIN: CPT | Performed by: STUDENT IN AN ORGANIZED HEALTH CARE EDUCATION/TRAINING PROGRAM

## 2025-03-27 PROCEDURE — G2211 COMPLEX E/M VISIT ADD ON: HCPCS | Performed by: STUDENT IN AN ORGANIZED HEALTH CARE EDUCATION/TRAINING PROGRAM

## 2025-03-27 RX ORDER — DIAZEPAM 5 MG/1
5 TABLET ORAL ONCE AS NEEDED
Qty: 1 TABLET | Refills: 0 | Status: SHIPPED | OUTPATIENT
Start: 2025-03-27

## 2025-03-27 NOTE — PROGRESS NOTES
Assessment:  1. Type 2 diabetes mellitus with other specified complication, with long-term current use of insulin (HCC)    2. 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 Severe obesity (HCC)    4. Anxiety    5. Spinal stenosis of lumbar region with neurogenic claudication        Plan:  Patient is a 79-year-old male past medical history of diabetes, morbid obesity sent in as referral from neurosurgery for evaluation of lumbar spinal stenosis.  Patient states that he has been having low back pain rating down bilateral lower extremities over the past several months without any inciting event.  Patient states that he was evaluated by neurosurgery and deemed nonsurgical candidate due to elevated A1c and recommended glucose control.  Patient states that he has been taking gabapentin 300 mg 3 times daily in addition to Robaxin-750 milligrams 3 times daily.  Patient also states that he is getting home PT.  MRI lumbar spine reviewed and interpreted by me in January 2025 showing severe spinal stenosis at L4-L5.  At this time, suspect the patient symptoms secondary to lumbar spinal stenosis and would benefit from interventional procedure.    Given that clinical presentation of lumbar spinal stenosis matches MRI findings, I would like to proceed forward with performing L5-S1 ILESI. Risks vs benefits discussed in detail with the patient. These risks include, but are not limited to, bleeding, infection, nerve damage, paralysis. Patient is not on anticoagulant therapy. Patient denies contrast allergy. All patient’s questions were answered. Patient understands risks and is willing to pursue the procedure. The approach was demonstrated using models and literature was provided. Verbal consent obtained.  Given anxiety, patient given prescription for Valium 5 mg po to be taken 30min prior to procedure.       My impressions and treatment recommendations were discussed in detail with the patient, who verbalized  understanding and had no further questions.    Follow-up is planned in  time or sooner as warranted.  Discharge instructions were provided. I personally saw and examined the patient and I agree with the above discussed plan of care.    History of Present Illness:    Stewart Flores is a 79 y.o. male who presents to [facility] for initial evaluation of the above stated pain complaints. The patient has a past medical and chronic pain history as outlined in the assessment section. He was referred by Adam Mccarthy PA-C  [address]  [address]  [place] [address]     Patient is a 79-year-old male past medical history of obesity, diabetes, sent in as referral from neurosurgery for evaluation of lumbar spinal stenosis.  Patient has been complaining of low back pain rating down bilateral lower extremities over the past several months without any inciting event.  Patient states the intensity of pain is severe, rated at 6-10 out of 10, states that the pain occurs constantly, described as a pressure-like sensation with associated paresthesias.  Patient states that he is currently taking gabapentin 300 mg 3 times daily in addition of Robaxin-750 milligrams 3 times daily.      Review of Systems:    Review of Systems   Constitutional:  Negative for chills and fatigue.   HENT:  Positive for hearing loss. Negative for ear pain, mouth sores and sinus pressure.    Eyes:  Negative for pain, redness and visual disturbance.   Respiratory:  Negative for shortness of breath and wheezing.    Cardiovascular:  Negative for chest pain and palpitations.   Gastrointestinal:  Negative for abdominal pain and nausea.   Endocrine: Negative for polyphagia.   Genitourinary:  Positive for frequency.   Musculoskeletal:  Positive for back pain, gait problem and joint swelling. Negative for arthralgias and neck pain.        Decreased ROM, joint and muscle pain   Skin:  Negative for wound.   Neurological:   Positive for weakness and light-headedness. Negative for seizures.   Psychiatric/Behavioral:  Positive for sleep disturbance. Negative for dysphoric mood. The patient is nervous/anxious.      General: no fevers, chills, infections  Neuro: no saddle anesthesia, no dropping objects or balance issues  GI: no changes in bowel habits  : no changes in bladder habits  Hem: no bleeding        Past Medical History:   Diagnosis Date    Arthritis     Chronic kidney disease     Diabetes mellitus (HCC)     History of wound infection ?2013    RIGHT LOWER LEG. WAS + FOR STAPH INFECTION AT THAT TIME    Hypertension     Shoulder abrasion     right side after a fall in Feb 2018       Past Surgical History:   Procedure Laterality Date    BONE EXOSTOSIS EXCISION Left 1/21/2025    Procedure: EXCISION EXOSTOSIS/saucerization left foot;  Surgeon: Ollie White DPM;  Location: [facility];  Service: Podiatry    CHOLECYSTECTOMY      COLONOSCOPY      KNEE ARTHROPLASTY Left 2014    [place] EXC B9 LESION MRGN XCP SK TG S/N/H/F/G 1.1-2.0CM Left 07/30/2018    Procedure: EXCISIONAL BIOPSY BENIGN NEOPLASM OF SKIN EXTREMITY;  Surgeon: Julio Cesar Trejo Jr., DPM;  Location: [facility] OR;  Service: Podiatry    STEROID INJECTION SHOULDER Right     8 CERIVAL SPINE INJECTIONS    TONSILLECTOMY         History reviewed. No pertinent family history.    Social History     Occupational History    Not on file   Tobacco Use    Smoking status: Every Day     Types: Cigars    Smokeless tobacco: Never    Tobacco comments:     1 cigar a day.   Substance and Sexual Activity    Alcohol use: Yes     Alcohol/week: 5.0 standard drinks of alcohol     Types: 5 Cans of beer per week     Comment: DAY,SOMETIMES MORE PER PATIENT    Drug use: No    Sexual activity: Not on file         Current Outpatient Medications:     acetaminophen (TYLENOL) 325 mg tablet, Take 3 tablets (975 mg total) by mouth every 8 (eight) hours, Disp: , Rfl:     aspirin (ECOTRIN LOW STRENGTH) 81 mg EC tablet,  Take 81 mg by mouth daily, Disp: , Rfl:     Continuous Glucose Sensor (FreeStyle [place] 2 Sensor) MISC, USE 1 SENSOR EVERY 14 DAYS, Disp: , Rfl:     dapagliflozin (Farxiga) 5 MG TABS, Take by mouth daily, Disp: , Rfl:     diazepam (VALIUM) 5 mg tablet, Take 1 tablet (5 mg total) by mouth once as needed for anxiety for up to 1 dose, Disp: 1 tablet, Rfl: 0    Diclofenac Sodium (VOLTAREN) 1 %, Apply 2 g topically 4 (four) times a day, Disp: , Rfl:     eplerenone (INSPRA) 25 mg tablet, Take 1 tablet (25 mg total) by mouth daily, Disp: , Rfl:     gabapentin (NEURONTIN) 300 mg capsule, Take 1 capsule (300 mg total) by mouth 3 (three) times a day, Disp: , Rfl:     glipiZIDE (GLUCOTROL) 5 mg tablet, Take 5 mg by mouth every morning, Disp: , Rfl:     insulin lispro protamine-insulin lispro (HumaLOG 75/25) 100 units/mL, Inject 60 Units under the skin 2 (two) times a day before meals 60 units a.m. And 58 units pm, Disp: , Rfl:     lidocaine (LIDODERM) 5 %, Apply 1 patch topically over 12 hours daily Remove & Discard patch within 12 hours or as directed by MD, Disp: , Rfl:     losartan (COZAAR) 50 mg tablet, Take 50 mg by mouth daily, Disp: , Rfl:     methocarbamol (ROBAXIN) 750 mg tablet, Take 1 tablet (750 mg total) by mouth 3 (three) times a day, Disp: , Rfl:     metoprolol succinate (TOPROL-XL) 100 mg 24 hr tablet, Take 150 mg by mouth daily at bedtime, Disp: , Rfl:     omega-3-acid ethyl esters (LOVAZA) 1 g capsule, Take 1 g by mouth 2 (two) times a day, Disp: , Rfl:     rosuvastatin (CRESTOR) 5 mg tablet, Take 5 mg by mouth daily, Disp: , Rfl:     torsemide (DEMADEX) 20 mg tablet, Take 20 mg by mouth 2 (two) times a day 1 tablet in AM and 1/2 tablet pm, Disp: , Rfl:     allopurinol (ZYLOPRIM) 100 mg tablet, Take 100 mg by mouth 2 (two) times a day (Patient not taking: Reported on 3/27/2025), Disp: , Rfl:     methylPREDNISolone 4 MG tablet therapy pack, Use as directed on package (Patient not taking: Reported on 3/27/2025),  "Disp: , Rfl:     polyethylene glycol (MIRALAX) 17 g packet, Take 17 g by mouth daily as needed (constipation) (Patient not taking: Reported on 3/27/2025), Disp: , Rfl:     saccharomyces boulardii (FLORASTOR) 250 mg capsule, Take 1 capsule (250 mg total) by mouth 2 (two) times a day (Patient not taking: Reported on 3/27/2025), Disp: , Rfl:     senna-docusate sodium (SENOKOT S) 8.6-50 mg per tablet, Take 2 tablets by mouth daily at bedtime (Patient not taking: Reported on 3/27/2025), Disp: , Rfl:     Allergies   Allergen Reactions    Ibuprofen Other (See Comments)     To avoid due to kidney problems       Physical Exam:    There were no vitals filed for this visit.  Constitutional: no apparent distress, does not appear sedated   HEENT: pupils equal and round, symmetric facial muscles   Neck: supple  Cardiovascular: well perfused, no peripheral cyanosis  Pulmonary: good chest wall excursion, breathing unlabored   Psych: appropriate affect and insight, No agitation. No evidence of aberrant behavior   Skin: No rashes or lesions  Neuro: cranial nerves II-XII grossly intact   MSK:  Inspection: no signs of infection to back  Palpation: no ttp to lumbar spine  ROM: no significant rom abnormalities in lumbar spine   MMT 5/5 strength in B/L LE  Sensation to light touch intact B/L LE  Gait:: in wheelchair       Ht 5' 8\" (1.727 m)   Wt 101 kg (223 lb)   BMI 33.91 kg/m²         Imaging   MRI lumbar spine wo contrast  Status: Final result     PACS Images     Show images for MRI lumbar spine wo contrast  Study Result    Narrative & Impression   MRI LUMBAR SPINE WITHOUT CONTRAST     INDICATION: lower back pain.     COMPARISON: CT lumbar spine study of January 30, 2025     TECHNIQUE:  Multiplanar, multisequence imaging of the lumbar spine was performed. .  Imaging performed on 1.5T MRI     IMAGE QUALITY:  Diagnostic     FINDINGS:     VERTEBRAL BODIES:  There are 5 lumbar type vertebral bodies. Trace grade 1 retrolisthesis of L1 on " L2. Type I Modic endplate degenerative changes at L1-L2. Heterogeneous marrow signal with scattered areas of focal fatty infiltration     SACRUM:  Normal signal within the sacrum. No evidence of insufficiency or stress fracture.     DISTAL CORD AND CONUS:  Normal size and signal within the distal cord and conus. There is severe compression of the cauda equina at the L4-5 level due to degenerative discogenic disease. Please see below.     PARASPINAL SOFT TISSUES: No prevertebral or paravertebral soft tissue edema.     LOWER THORACIC DISC SPACES: No degenerative discogenic disease.     LUMBAR DISC SPACES: Moderate to severe disc height loss at L1-L2 and L5-S1.     L1-L2: Disc desiccation, disc height loss, endplate degenerative changes. Anterior and far lateral disc bulging and osteophytosis. Posteriorly, the disc bulges partially contained by the PLL. There is mild spinal stenosis and mild bilateral foraminal   narrowing.     L2-L3: Left foraminal and far lateral protrusion endplate osteophytic ridging. No spinal canal or foraminal stenosis.     L3-L4: Circumferential disc bulge with a superimposed central protrusion with mild left greater than right subarticular recess encroachment and mild spinal stenosis. Patent neural foramina.     L4-L5: Disc bulge, eccentric to the left with a central and left central protrusion. This is effacing the left subarticular zone and severely compressing the thecal sac. The nerve roots of the cauda equina are also compressed at this level. Bilateral   facet arthropathy is noted. No foraminal stenosis.     L5-S1: Circumferential disc bulge and endplate osteophytic ridging, more pronounced on the right than the left. Bilateral subarticular zone stenosis with encroachment upon the traversing S1 nerve roots. Bilateral facet arthropathy is noted. Combined with   the right greater than left endplate osteophytic ridging and discogenic disease, there is severe right and moderate left  foraminal stenosis. Correlate for right greater than left L5 radiculitis.     OTHER FINDINGS: Bilateral renal cortical atrophy and cystic change. Prominent intrasinus fat.     IMPRESSION:     Multilevel degenerative discogenic disease, most notably at L4-L5 where there is a central and left central disc protrusion resulting in severe spinal stenosis and compression of the nerve roots of the cauda equina. Surgical consultation is recommended.     Additional findings of degenerative discogenic disease at L5-S1 with endplate osteophytic ridging and facet arthropathy resulting in right greater than left foraminal stenosis and bilateral subarticular zone encroachment. Correlate for right greater than   left L5 and bilateral S1 radiculitis.     The study was marked in EPIC for immediate notification.        Workstation performed: [id number]        Imaging    MRI lumbar spine wo contrast (Order: [id number]) - 1/31/2025    Result History    MRI lumbar spine wo contrast (Order #[id number]) on 1/31/2025 - Order Result History Report    Order Report     Order Details  Order Questions    Question Answer Comment   What body part is requested? Lumbar spine    What is the scan priority? Tier 2 - Within 24 Hours    What is the exam indication? (Please refer to the Reference Link below for more details on each indication) Other low back pain   What is the patient's sedation requirement? No Sedation needs Ativan prior to MRI   Metallic implants? Yes Rt knee replacement   Note: Answer Yes or No   Is the ordering provider the contact for questions? No    What provider can be contacted by the Radiologist if additional information is needed? Dr Vela    Release to patient through [employer] Immediate    Is this for a Wake Up Stroke Alert? No    Exam reason lower back pain    Note: Enter reason for exam            Result Information    Status Priority Source   Final result (1/31/2025  2:13 PM) Routine      Other Results from 1/30/2025      Fingerstick Glucose (POCT) Final result 2/4/2025    Fingerstick Glucose (POCT) Final result 2/4/2025    Fingerstick Glucose (POCT) Final result 2/4/2025    Fingerstick Glucose (POCT) Final result 2/4/2025    Basic metabolic panel Final result 2/4/2025    Fingerstick Glucose (POCT) Final result 2/3/2025    Fingerstick Glucose (POCT) Final result 2/3/2025    Fingerstick Glucose (POCT) Final result 2/3/2025    Fingerstick Glucose (POCT) Final result 2/3/2025    CBC and differential Final result 2/3/2025    Basic metabolic panel Final result 2/3/2025    Fingerstick Glucose (POCT) Final result 2/3/2025    Fingerstick Glucose (POCT) Final result 2/2/2025    Fingerstick Glucose (POCT) Final result 2/2/2025    Fingerstick Glucose (POCT) Final result 2/2/2025    Fingerstick Glucose (POCT) Final result 2/2/2025    CBC and differential Final result 2/2/2025    Basic metabolic panel Final result 2/2/2025    Fingerstick Glucose (POCT) Final result 2/2/2025    Fingerstick Glucose (POCT) Final result 2/1/2025    important suggestion  Warning: Additional results from 1/30/2025 are available but are not displayed in this report.       Reason for Exam    lower back pain           MRI lumbar spine wo contrast: Patient Communication     Add Comments   Not seen       Signed by    Signed Date/Time  Phone Pager   SHAH, PALLAV N 1/31/2025 14:13 [phone]        Exam Information    Status Exam Begun  Exam Ended  Performing Tech   Final [99] 1/31/2025 12:45 1/31/2025 13:17 Aisha Patterson       Questionnaire          Question Answer Comment   1. What body part is requested? Lumbar spine    2. What is the scan priority? Tier 2 - Within 24 Hours    3. What is the exam indication? (Please refer to the Reference Link below for more details on each indication) Other low back pain   4. What is the patient's sedation requirement? No Sedation needs Ativan prior to MRI   5. Does the patient have metallic implants, such as a pacemaker or  shunt? Yes Rt knee replacement   6. Is the ordering provider the contact for questions? No    7. What provider can be contacted by the Radiologist if additional information is needed? Dr Vela    8. Release to patient through [employer] Immediate    9. Is this for a Wake Up Stroke Alert? No    10. Reason for Exam lower back pain          Begin Exam      IMAGING BEGIN MRI    Question Answer Comment   1. Have you reviewed the MRI Screening Form? Yes    2. Have you performed a Full Stop/Final Check before entering Zone 4? Yes    3. Is the patient wearing a stimulator device? No    4. Device in safe mode - Verifying MRSO / Technologist :          End Exam      [facility] IMAGING END VERIFY IMAGES IN PACS    Question Answer Comment   1. Have you verified the patient's images in PACS? Yes    2. Why have the images not been verified in PACS?           IMAGING END MRI TECH NOTES    Question Answer Comment   1. Patient History: lower back pain, unable to walk    2. Has the patient had prior surgery on this body part? no    3. Any history of cancer? no    4. Add'l Imaging Notes: no    5. Outside images/report information: no    6. Implant clearance information: CGM removed    7. LMP and ORAL contraceptives: n/a    8. Was jewelry removed from the patient? No    9. Jewelry removed:           PATIENT EDUCATION    Question Answer Comment   1. Was the patient educated? Yes    2. Why was the patient not educated?           IMAGING END MRI DEVICE SAFE MODE    Question Answer Comment   1. Is the patient wearing a stimulator device? No    2. Device out of safe mode - Verifying MRSO / Technologist :            Screening Form Questions     important suggestion  Questionnaires are displayed in the order they were answered.      Answer Comment   Has patient changed into the appropriate hospital gown? Yes    What are your symptoms? back pain    Do you have a cardiac pacemaker or internal defibrillator? NA    Please list /make/model:    "  Have you had any HEART surgery? None    Please list make/model:     Heart surgery: If \"other\", please describe:     Have you had any BRAIN surgery? None    Please list  or describe implant or type \"NA\" if not applicable:     Brain surgery: If \"other\", please describe:     Have you had any EYE surgery? None    Eye surgery: If \"other\", please describe:     Have you ever injured your eyes with metal or metal fragments (grinding,metallic slivers)? No    Eye injury: Please describe:     Have you had any EAR surgery? None    Ear surgery: If \"other\", please describe:     Do you have an electronic \"stimulation\" device? None    Please provide  information:     Have you had any abdominal or pelvic surgery? Yes    Have you had any of the following abdominal or pelvic surgeries: Other cholecyctectomy   Abdominal/pelvic surgery: If \"other\", please describe: cholecystectomy    Do you have any ORTHOPEDIC implants/devices? Joint replacement L knee replacement   Do you have the following: None    Do you have liver disease? None    Do you have kidney disease? Diabetic    Have you had a problem with a previous MRI? Yes the noise bothers the patient   Does the patient require pre-medication?     Do you have a personal history of cancer? None    If \"other\", please specify:       Are you wearing medication patches?   No    Are you wearing any of the following: None    For inpatients, do you have the following: None    For inpatients, do you have the following: None freestyle(blood sugar check)   Did the patient provide this information? Yes    Who provided this information (if not the patient)?     Relationship to the patient:     Contact number:     MRI STAFF ONLY- Prior imaging reviewed in PACS (initials): AN    MRI STAFF ONLY- Epic chart reviewed (initials): AN        External Results Report    Open External Results Report      Encounter    View Encounter                     Sedation Documentation Timeline " (1/31/2025 00:00 to 1/31/2025 13:17)    An end event has not been filed for the most recent intervention. Due to this intervention’s length, all data may not appear below. To see all data, file an end event.  1/31/2025 1/31/2025 Event By   01:14:59 Apply surgical shoe to affective extremity Completed MS   Apply surgical shoe to affective extremity         02:11 Specimens Collected MR   Fingerstick Glucose (POCT)  - ID: [id number] Type: Blood         02:11:50 Orders Placed SI   Point of Care Testing-Docked Device  - Fingerstick Glucose (POCT)         02:11:53 Fingerstick Glucose (POCT) Resulted BL   Abnormal Result  Collected: 1/31/2025 02:11  Last updated: 1/31/2025 02:11  Status: Final result  POC Glucose: 184 mg/dl High  [Ref Range: 65 - 140]         02:13 Medication Given MS   HYDROmorphone HCl (DILAUDID) injection 0.2 mg - Dose: 0.2 mg ; Route: Intravenous ; Line: Peripheral IV 01/30/25 Distal;Left;Upper;Ventral (anterior) Arm         02:13 Pain Assessment MS   Pain Assessment   Pain Assessment Tool: 0-10   Pain Score: 7   Pain Location/Orientation: Location: Leg; Location: Back   Pain Onset/Description: Onset: Ongoing; Frequency: Constant/Continuous; Descriptor: Aching   Patient's Stated Pain Goal: No pain   Hospital Pain Intervention(s): Medication (See MAR); Rest         POSS Assessment   Pasero Opioid-Induced Sedation Scale (POSS): Awake and alert         Pain Assessment Timer   Restart Pain Assessment Timer: Yes   02:13:12 Other Flowsheet Documentation MS   Other flowsheet entries   Temperature: 97.1 °F (36.2 °C) Abnormal    Temp Source: Temporal   Pulse: 56   Respirations: 18   SpO2: 95 %   O2 Device: None (Room air)   Heart Rate Source: Monitor   BP Location: Right arm   BP Method: Automatic   Restart Vitals Timer: Yes   Patient Position - Orthostatic VS: Lying   SpO2 Activity: At Rest   02:13:12 Vital Signs DI   Other flowsheet entries   Blood Pressure: 134/58 (Device Time: 02:13:12)   02:21  Medication Given MS   insulin lispro (HumALOG/ADMELOG) 100 units/mL subcutaneous injection 1-5 Units - Dose: 1 Units ; Route: Subcutaneous ; Site: Left Arm ; Scheduled Time: 0200         02:21:51 Orders Completed MS   Fingerstick Glucose (POCT) At 2am         02:21:51 Fingerstick Glucose (POCT) At 2am Completed MS   Fingerstick Glucose (POCT) At 2am         02:43 Pain Assessment MS   Pain Assessment Post Intervention   Reason Not Indicated: Sleeping / Easy to arouse   Post Intervention POSS: Sleep, Easy to arouse         Pain Assessment Timer   Restart Pain Assessment Timer: Yes   03:45 Medication Patch Removed MS   lidocaine (LIDODERM) 5 % patch 1 patch - Dose: 1 patch ; Route: Topical ; Scheduled Time: 0309 ; Comment: removed already         05:28 Medication Given MS   acetaminophen (TYLENOL) tablet 975 mg - Dose: 975 mg ; Route: Oral ; Scheduled Time: 0600         05:28 Pain Assessment MS   Pain Assessment   Pain Assessment Tool: 0-10   Pain Score: 3   Pain Location/Orientation: Location: Back   Pain Onset/Description: Onset: Ongoing; Frequency: Constant/Continuous; Descriptor: Discomfort; Descriptor: Aching   Patient's Stated Pain Goal: No pain   Hospital Pain Intervention(s): Medication (See MAR); Rest         Pain Assessment Timer   Restart Pain Assessment Timer: Yes   05:33 Collect Basic metabolic panel Completed MS   Basic metabolic panel  - Type: Blood ; Source: Hand, Left         05:33 Collect Magnesium Completed MS   Magnesium  - Type: Blood ; Source: Hand, Left         05:33:04 Specimens Collected MS   Basic metabolic panel  - ID: [id number] Type: Blood  Magnesium  - ID: [id number] Type: Blood         05:58:53 Print Label for CBC (Redraw - Quantity Not Sufficient) Completed MS   CBC  - Type: Blood ; Source: Hand, Left         06:37 Collect CBC Completed MS   CBC  - Type: Blood ; Source: Hand, Left         06:37:47 Specimens Collected MS   CBC  - ID: [id number] Type: Blood         06:43:52 CBC  Resulted BL   Abnormal Result  Collected: 1/31/2025 06:37  Last updated: 1/31/2025 06:43  Status: Final result  WBC: 13.33 Thousand/uL High  [Ref Range: 4.31 - 10.16]  RBC: 4.92 Million/uL [Ref Range: 3.88 - 5.62]  Hemoglobin: 15.4 g/dL [Ref Range: 12.0 - 17.0]  Hematocrit: 46.3 % [Ref Range: 36.5 - 49.3]  MCV: 94 fL [Ref Range: 82 - 98]  MCH: 31.3 pg [Ref Range: 26.8 - 34.3]  MCHC: 33.3 g/dL [Ref Range: 31.4 - 37.4]  RDW: 13.6 % [Ref Range: 11.6 - 15.1]  Platelets: 219 Thousands/uL [Ref Range: 149 - 390]  MPV: 9.4 fL [Ref Range: 8.9 - 12.7]         07:05:34 Basic metabolic panel Resulted BL   Abnormal Result  Collected: 1/31/2025 05:33  Last updated: 1/31/2025 07:05  Status: Final result  Sodium: 140 mmol/L [Ref Range: 135 - 147]  Potassium: 3.9 mmol/L [Ref Range: 3.5 - 5.3]  Chloride: 107 mmol/L [Ref Range: 96 - 108]  CO2: 19 mmol/L Low  [Ref Range: 21 - 32]  ANION GAP: 14 mmol/L High  [Ref Range: 4 - 13]  BUN: 30 mg/dL High  [Ref Range: 5 - 25]  Creatinine: 1.82 mg/dL High  [Ref Range: 0.60 - 1.30] (Standardized to IDMS reference method)  Glucose: 175 mg/dL High  [Ref Range: 65 - 140] (If the patient is fasting, the ADA then defines impaired fasting glucose as > 100 mg/dL and diabetes as > or equal to 123 mg/dL.)  Calcium: 9.8 mg/dL [Ref Range: 8.4 - 10.2]  eGFR: 34 ml/min/1.73sq m         07:05:34 Magnesium Resulted BL   Collected: 1/31/2025 05:33  Last updated: 1/31/2025 07:05  Status: Final result  Magnesium: 2.0 mg/dL [Ref Range: 1.9 - 2.7]         07:20 Specimens Collected SC   Fingerstick Glucose (POCT)  - ID: [id number] Type: Blood         07:20:55 Orders Placed SI   Point of Care Testing-Docked Device  - Fingerstick Glucose (POCT)         07:21 Fingerstick Glucose (POCT) Resulted BL   Abnormal Result  Collected: 1/31/2025 07:20  Last updated: 1/31/2025 07:21  Status: Final result  POC Glucose: 221 mg/dl High  [Ref Range: 65 - 140]         08:30 Peripheral IV 01/30/25 Distal;Left;Upper;Ventral (anterior)  Arm Assessment TW   Site Assessment: WDL   Dressing Status: Clean; Dry; Intact   Line Status: Blood return noted; Flushed; Saline locked   Dressing Change Due: 02/03/25   Dressing Type: Transparent   08:33:45 Pain Assessment TW   Pain Assessment   Pain Assessment Tool: 0-10   Pain Score: 0         Pain Assessment Timer   Restart Pain Assessment Timer: Yes   08:33:45 Other Flowsheet Documentation TW   Other flowsheet entries   Temperature: 97.4 °F (36.3 °C) Abnormal    Temp Source: Temporal   Pulse: 60   Respirations: 18   SpO2: 96 %   P.O.: 350 mL   O2 Device: None (Room air)   Heart Rate Source: Monitor   Peripheral Vascular (WDL): Within Defined Limits   Sensory Perceptions: Slightly limited   Moisture: Rarely moist   Activity: Chairfast   Mobility: Slightly limited   Nutrition: Adequate   Friction and Shear: No apparent problem   Ramin Scale Score: 18   Stool Color: Brown   Stool Amount: Medium   Stool Appearance: Soft   Restart Vitals Timer: Yes   Bowel Incontinence: No   General Pressure Injury Prevention Interventions: Encourage Mobilization; Skin assessment, including under medical devices; Turn and reposition every 2 hours; Seating cushion; Elevate heels off of bed; Weight Shifts   SpO2 Activity: At Rest   08:33:45 Vital Signs DI   Other flowsheet entries   Blood Pressure: 134/81 (Device Time: 08:33:45)   08:35 Medication Given TW   allopurinol (ZYLOPRIM) tablet 100 mg - Dose: 100 mg ; Route: Oral ; Scheduled Time: 0900         08:35 Medication Given TW   aspirin (ECOTRIN LOW STRENGTH) EC tablet 81 mg - Dose: 81 mg ; Route: Oral ; Scheduled Time: 0900         08:35 Medication Given TW   enoxaparin (LOVENOX) subcutaneous injection 30 mg - Dose: 30 mg ; Route: Subcutaneous ; Site: Abdominal Tissue ; Scheduled Time: 0900         08:36 Medication Given TW   glipiZIDE (GLUCOTROL) tablet 5 mg - Dose: 5 mg ; Route: Oral ; Scheduled Time: 0900         08:36 Medication Given TW   fish oil capsule 1,000 mg - Dose:  1,000 mg ; Route: Oral ; Scheduled Time: 0900         08:37 Medication Given TW   eplerenone (INSPRA) tablet 25 mg - Dose: 25 mg ; Route: Oral ; Scheduled Time: 0900         08:38 Medication Given TW   losartan (COZAAR) tablet 50 mg - Dose: 50 mg ; Route: Oral ; Scheduled Time: 0900         08:38 Medication Given TW   torsemide (DEMADEX) tablet 20 mg - Dose: 20 mg ; Route: Oral ; Scheduled Time: 0900         08:38 Medication Given TW   saccharomyces boulardii (FLORASTOR) capsule 250 mg - Dose: 250 mg ; Route: Oral ; Scheduled Time: 0900         08:41 Medication Given TW   insulin aspart protamine-insulin aspart (NovoLOG 70/30) 100 units/mL subcutaneous injection 50 Units - Dose: 50 Units ; Route: Subcutaneous ; Site: Abdominal Tissue ; Scheduled Time: 0700         08:41 Medication Given TW   insulin lispro (HumALOG/ADMELOG) 100 units/mL subcutaneous injection 1-5 Units - Dose: 1 Units ; Route: Subcutaneous ; Site: Abdominal Tissue ; Scheduled Time: 0700         08:42:20 Orders Completed SC   Fingerstick Glucose (POCT) Before meals and at bedtime         08:42:20 Fingerstick Glucose (POCT) Before meals and at bedtime Completed SC   Fingerstick Glucose (POCT) Before meals and at bedtime         08:42:22 Orders Completed SC   Fingerstick Glucose (POCT)         08:42:22 Fingerstick Glucose (POCT) Completed SC   Fingerstick Glucose (POCT)         09:50 Pain Assessment JM   Pain Assessment   Pain Assessment Tool: 0-10   Pain Score: (no pain at rest, 9/10 pain L lower anterior leg and R posterior leg with activity)   Pain Location/Orientation: (back/legs at rest)   10:57 Specimens Collected SC   Fingerstick Glucose (POCT)  - ID: [id number] Type: Blood         10:58:17 Orders Placed SI   Point of Care Testing-Docked Device  - Fingerstick Glucose (POCT)         10:58:19 Fingerstick Glucose (POCT) Resulted BL   Abnormal Result  Collected: 1/31/2025 10:57  Last updated: 1/31/2025 10:58  Status: Final result  POC Glucose: 202  mg/dl High  [Ref Range: 65 - 140]         11:36:20 Orders Completed SC   Fingerstick Glucose (POCT) Before meals and at bedtime         11:36:20 Fingerstick Glucose (POCT) Before meals and at bedtime Completed SC   Fingerstick Glucose (POCT) Before meals and at bedtime         11:36:22 Orders Completed SC   Fingerstick Glucose (POCT)         11:36:22 Fingerstick Glucose (POCT) Completed SC   Fingerstick Glucose (POCT)         11:37 Medication Given TW   insulin lispro (HumALOG/ADMELOG) 100 units/mL subcutaneous injection 1-5 Units - Dose: 1 Units ; Route: Subcutaneous ; Site: Left Arm ; Scheduled Time: 1130         11:38 Medication Given TW   LORazepam (ATIVAN) injection 1 mg - Dose: 1 mg ; Route: Intravenous ; Line: Peripheral IV 01/30/25 Distal;Left;Upper;Ventral (anterior) Arm         11:43:08 Orders Placed JS   Lab  - Urinalysis with microscopic         11:44:43 Medication Held by provider JS   torsemide (DEMADEX) tablet 20 mg - Scheduled Time: 1144         11:44:43 Medication Held by provider JS   torsemide (DEMADEX) tablet 10 mg - Scheduled Time: 1144         11:45:01 Orders Placed JS   Medications  - sodium chloride 0.9 % infusion         11:54:25 Print Label for Urinalysis with microscopic  Completed TW   Urinalysis with microscopic  - Type: Urine ; Source: Urine, Clean Catch         11:58 Medication Given TW   HYDROmorphone HCl (DILAUDID) injection 0.2 mg - Dose: 0.2 mg ; Route: Intravenous ; Line: Peripheral IV 01/30/25 Distal;Left;Upper;Ventral (anterior) Arm         11:58 Medication New Bag TW   sodium chloride 0.9 % infusion - Dose: 75 mL/hr ; Rate: 75 mL/hr ; Route: Intravenous ; Line: Peripheral IV 01/30/25 Distal;Left;Upper;Ventral (anterior) Arm ; Scheduled Time: 1200         11:58 Pain Assessment TW   Pain Assessment   Pain Assessment Tool: 0-10   Pain Score: 9   Pain Location/Orientation: Location: Back   Hospital Pain Intervention(s): Medication (See MAR)         POSS Assessment   [place]  Opioid-Induced Sedation Scale (POSS): Awake and alert         Pain Assessment Timer   Restart Pain Assessment Timer: Yes   11:58 Intake/Output TW   Other flowsheet entries   P.O.: 200 mL   Urine: 400 mL   11:59 Collect Urinalysis with microscopic Completed TW   Urinalysis with microscopic  - Type: Urine ; Source: Urine, Clean Catch         11:59:18 Specimens Collected TW   Urinalysis with microscopic  - ID: [id number] Type: Urine         12:01:05 Orders Acknowledged TW   New  - Urinalysis with microscopic; sodium chloride 0.9 % infusion  Held  - torsemide (DEMADEX) tablet 20 mg; torsemide (DEMADEX) tablet 10 mg         12:08:56 Orders Placed JS   Nourishments  - Room Service         12:10:44 Orders Acknowledged TW   New  - Room Service         12:28 Pain Assessment TW   Pain Assessment Post Intervention   Pain Assessment Tool Used:: 0-10   Post Intervention Pain Score: 2   Post Intervention Pain Location/Orientation: Location: Back   Post Intervention POSS: Awake and alert         Pain Assessment Timer   Restart Pain Assessment Timer: Yes   12:47:24 Urinalysis with microscopic Resulted AC   Abnormal Result  Collected: 1/31/2025 11:59  Last updated: 1/31/2025 12:47  Status: Final result  Color, UA: Yellow  Clarity, UA: Clear  Specific Gravity, UA: 1.015 [Ref Range: 1.005 - 1.030]  pH, UA: 5.0 [Ref Range: 4.5, 5.0, 5.5, 6.0, 6.5, 7.0, 7.5, 8.0]  Leukocytes, UA: Negative [Ref Range: Negative]  Nitrite, UA: Negative [Ref Range: Negative]  Protein, UA: Negative mg/dl [Ref Range: Negative]  Glucose, UA: 1000 (1%) mg/dl Abnormal  [Ref Range: Negative] (Elevated glucose may cause false negative leukocyte esterase)  Ketones, UA: Negative mg/dl [Ref Range: Negative]  Urobilinogen, UA: <2.0 mg/dl [Ref Range: <2.0 mg/dl]  Bilirubin, UA: Negative [Ref Range: Negative]  Occult Blood, UA: Negative [Ref Range: Negative]  RBC, UA: 0-1 /hpf Abnormal  [Ref Range: None Seen, 2-4]  WBC, UA: None Seen /hpf [Ref Range: None Seen, 2-4,  5-60]  Epithelial Cells: Occasional /hpf [Ref Range: None Seen, Occasional]  Bacteria, UA: None Seen /hpf [Ref Range: None Seen, Occasional]  Hyaline Casts, UA: 0-1 /lpf Abnormal  [Ref Range: (none)]           Pre-op Summary    Pre-op             Recovery Summary    Recovery                 Routing History    None         No orders to display       No orders of the defined types were placed in this encounter.

## 2025-03-27 NOTE — H&P (VIEW-ONLY)
Assessment:  1. Type 2 diabetes mellitus with other specified complication, with long-term current use of insulin (HCC)    2. 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 Severe obesity (HCC)    4. Anxiety    5. Spinal stenosis of lumbar region with neurogenic claudication        Plan:  Patient is a 79-year-old male past medical history of diabetes, morbid obesity sent in as referral from neurosurgery for evaluation of lumbar spinal stenosis.  Patient states that he has been having low back pain rating down bilateral lower extremities over the past several months without any inciting event.  Patient states that he was evaluated by neurosurgery and deemed nonsurgical candidate due to elevated A1c and recommended glucose control.  Patient states that he has been taking gabapentin 300 mg 3 times daily in addition to Robaxin-750 milligrams 3 times daily.  Patient also states that he is getting home PT.  MRI lumbar spine reviewed and interpreted by me in January 2025 showing severe spinal stenosis at L4-L5.  At this time, suspect the patient symptoms secondary to lumbar spinal stenosis and would benefit from interventional procedure.    Given that clinical presentation of lumbar spinal stenosis matches MRI findings, I would like to proceed forward with performing L5-S1 ILESI. Risks vs benefits discussed in detail with the patient. These risks include, but are not limited to, bleeding, infection, nerve damage, paralysis. Patient is not on anticoagulant therapy. Patient denies contrast allergy. All patient’s questions were answered. Patient understands risks and is willing to pursue the procedure. The approach was demonstrated using models and literature was provided. Verbal consent obtained.  Given anxiety, patient given prescription for Valium 5 mg po to be taken 30min prior to procedure.       My impressions and treatment recommendations were discussed in detail with the patient, who verbalized  understanding and had no further questions.    Follow-up is planned in  time or sooner as warranted.  Discharge instructions were provided. I personally saw and examined the patient and I agree with the above discussed plan of care.    History of Present Illness:    Stewart Flores is a 79 y.o. male who presents to [facility] for initial evaluation of the above stated pain complaints. The patient has a past medical and chronic pain history as outlined in the assessment section. He was referred by Adam Mccarthy PA-C  [address]  [address]  [place] [address]     Patient is a 79-year-old male past medical history of obesity, diabetes, sent in as referral from neurosurgery for evaluation of lumbar spinal stenosis.  Patient has been complaining of low back pain rating down bilateral lower extremities over the past several months without any inciting event.  Patient states the intensity of pain is severe, rated at 6-10 out of 10, states that the pain occurs constantly, described as a pressure-like sensation with associated paresthesias.  Patient states that he is currently taking gabapentin 300 mg 3 times daily in addition of Robaxin-750 milligrams 3 times daily.      Review of Systems:    Review of Systems   Constitutional:  Negative for chills and fatigue.   HENT:  Positive for hearing loss. Negative for ear pain, mouth sores and sinus pressure.    Eyes:  Negative for pain, redness and visual disturbance.   Respiratory:  Negative for shortness of breath and wheezing.    Cardiovascular:  Negative for chest pain and palpitations.   Gastrointestinal:  Negative for abdominal pain and nausea.   Endocrine: Negative for polyphagia.   Genitourinary:  Positive for frequency.   Musculoskeletal:  Positive for back pain, gait problem and joint swelling. Negative for arthralgias and neck pain.        Decreased ROM, joint and muscle pain   Skin:  Negative for wound.   Neurological:   Positive for weakness and light-headedness. Negative for seizures.   Psychiatric/Behavioral:  Positive for sleep disturbance. Negative for dysphoric mood. The patient is nervous/anxious.      General: no fevers, chills, infections  Neuro: no saddle anesthesia, no dropping objects or balance issues  GI: no changes in bowel habits  : no changes in bladder habits  Hem: no bleeding        Past Medical History:   Diagnosis Date    Arthritis     Chronic kidney disease     Diabetes mellitus (HCC)     History of wound infection ?2013    RIGHT LOWER LEG. WAS + FOR STAPH INFECTION AT THAT TIME    Hypertension     Shoulder abrasion     right side after a fall in Feb 2018       Past Surgical History:   Procedure Laterality Date    BONE EXOSTOSIS EXCISION Left 1/21/2025    Procedure: EXCISION EXOSTOSIS/saucerization left foot;  Surgeon: Ollie White DPM;  Location: [facility];  Service: Podiatry    CHOLECYSTECTOMY      COLONOSCOPY      KNEE ARTHROPLASTY Left 2014    ME EXC B9 LESION MRGN XCP SK TG S/N/H/F/G 1.1-2.0CM Left 07/30/2018    Procedure: EXCISIONAL BIOPSY BENIGN NEOPLASM OF SKIN EXTREMITY;  Surgeon: Julio Cesar Trejo Jr., DPM;  Location: [facility] OR;  Service: Podiatry    STEROID INJECTION SHOULDER Right     8 CERIVAL SPINE INJECTIONS    TONSILLECTOMY         History reviewed. No pertinent family history.    Social History     Occupational History    Not on file   Tobacco Use    Smoking status: Every Day     Types: Cigars    Smokeless tobacco: Never    Tobacco comments:     1 cigar a day.   Substance and Sexual Activity    Alcohol use: Yes     Alcohol/week: 5.0 standard drinks of alcohol     Types: 5 Cans of beer per week     Comment: DAY,SOMETIMES MORE PER PATIENT    Drug use: No    Sexual activity: Not on file         Current Outpatient Medications:     acetaminophen (TYLENOL) 325 mg tablet, Take 3 tablets (975 mg total) by mouth every 8 (eight) hours, Disp: , Rfl:     aspirin (ECOTRIN LOW STRENGTH) 81 mg EC tablet,  Take 81 mg by mouth daily, Disp: , Rfl:     Continuous Glucose Sensor (FreeStyle [place] 2 Sensor) MISC, USE 1 SENSOR EVERY 14 DAYS, Disp: , Rfl:     dapagliflozin (Farxiga) 5 MG TABS, Take by mouth daily, Disp: , Rfl:     diazepam (VALIUM) 5 mg tablet, Take 1 tablet (5 mg total) by mouth once as needed for anxiety for up to 1 dose, Disp: 1 tablet, Rfl: 0    Diclofenac Sodium (VOLTAREN) 1 %, Apply 2 g topically 4 (four) times a day, Disp: , Rfl:     eplerenone (INSPRA) 25 mg tablet, Take 1 tablet (25 mg total) by mouth daily, Disp: , Rfl:     gabapentin (NEURONTIN) 300 mg capsule, Take 1 capsule (300 mg total) by mouth 3 (three) times a day, Disp: , Rfl:     glipiZIDE (GLUCOTROL) 5 mg tablet, Take 5 mg by mouth every morning, Disp: , Rfl:     insulin lispro protamine-insulin lispro (HumaLOG 75/25) 100 units/mL, Inject 60 Units under the skin 2 (two) times a day before meals 60 units a.m. And 58 units pm, Disp: , Rfl:     lidocaine (LIDODERM) 5 %, Apply 1 patch topically over 12 hours daily Remove & Discard patch within 12 hours or as directed by MD, Disp: , Rfl:     losartan (COZAAR) 50 mg tablet, Take 50 mg by mouth daily, Disp: , Rfl:     methocarbamol (ROBAXIN) 750 mg tablet, Take 1 tablet (750 mg total) by mouth 3 (three) times a day, Disp: , Rfl:     metoprolol succinate (TOPROL-XL) 100 mg 24 hr tablet, Take 150 mg by mouth daily at bedtime, Disp: , Rfl:     omega-3-acid ethyl esters (LOVAZA) 1 g capsule, Take 1 g by mouth 2 (two) times a day, Disp: , Rfl:     rosuvastatin (CRESTOR) 5 mg tablet, Take 5 mg by mouth daily, Disp: , Rfl:     torsemide (DEMADEX) 20 mg tablet, Take 20 mg by mouth 2 (two) times a day 1 tablet in AM and 1/2 tablet pm, Disp: , Rfl:     allopurinol (ZYLOPRIM) 100 mg tablet, Take 100 mg by mouth 2 (two) times a day (Patient not taking: Reported on 3/27/2025), Disp: , Rfl:     methylPREDNISolone 4 MG tablet therapy pack, Use as directed on package (Patient not taking: Reported on 3/27/2025),  "Disp: , Rfl:     polyethylene glycol (MIRALAX) 17 g packet, Take 17 g by mouth daily as needed (constipation) (Patient not taking: Reported on 3/27/2025), Disp: , Rfl:     saccharomyces boulardii (FLORASTOR) 250 mg capsule, Take 1 capsule (250 mg total) by mouth 2 (two) times a day (Patient not taking: Reported on 3/27/2025), Disp: , Rfl:     senna-docusate sodium (SENOKOT S) 8.6-50 mg per tablet, Take 2 tablets by mouth daily at bedtime (Patient not taking: Reported on 3/27/2025), Disp: , Rfl:     Allergies   Allergen Reactions    Ibuprofen Other (See Comments)     To avoid due to kidney problems       Physical Exam:    There were no vitals filed for this visit.  Constitutional: no apparent distress, does not appear sedated   HEENT: pupils equal and round, symmetric facial muscles   Neck: supple  Cardiovascular: well perfused, no peripheral cyanosis  Pulmonary: good chest wall excursion, breathing unlabored   Psych: appropriate affect and insight, No agitation. No evidence of aberrant behavior   Skin: No rashes or lesions  Neuro: cranial nerves II-XII grossly intact   MSK:  Inspection: no signs of infection to back  Palpation: no ttp to lumbar spine  ROM: no significant rom abnormalities in lumbar spine   MMT 5/5 strength in B/L LE  Sensation to light touch intact B/L LE  Gait:: in wheelchair       Ht 5' 8\" (1.727 m)   Wt 101 kg (223 lb)   BMI 33.91 kg/m²         Imaging   MRI lumbar spine wo contrast  Status: Final result     PACS Images     Show images for MRI lumbar spine wo contrast  Study Result    Narrative & Impression   MRI LUMBAR SPINE WITHOUT CONTRAST     INDICATION: lower back pain.     COMPARISON: CT lumbar spine study of January 30, 2025     TECHNIQUE:  Multiplanar, multisequence imaging of the lumbar spine was performed. .  Imaging performed on 1.5T MRI     IMAGE QUALITY:  Diagnostic     FINDINGS:     VERTEBRAL BODIES:  There are 5 lumbar type vertebral bodies. Trace grade 1 retrolisthesis of L1 on " L2. Type I Modic endplate degenerative changes at L1-L2. Heterogeneous marrow signal with scattered areas of focal fatty infiltration     SACRUM:  Normal signal within the sacrum. No evidence of insufficiency or stress fracture.     DISTAL CORD AND CONUS:  Normal size and signal within the distal cord and conus. There is severe compression of the cauda equina at the L4-5 level due to degenerative discogenic disease. Please see below.     PARASPINAL SOFT TISSUES: No prevertebral or paravertebral soft tissue edema.     LOWER THORACIC DISC SPACES: No degenerative discogenic disease.     LUMBAR DISC SPACES: Moderate to severe disc height loss at L1-L2 and L5-S1.     L1-L2: Disc desiccation, disc height loss, endplate degenerative changes. Anterior and far lateral disc bulging and osteophytosis. Posteriorly, the disc bulges partially contained by the PLL. There is mild spinal stenosis and mild bilateral foraminal   narrowing.     L2-L3: Left foraminal and far lateral protrusion endplate osteophytic ridging. No spinal canal or foraminal stenosis.     L3-L4: Circumferential disc bulge with a superimposed central protrusion with mild left greater than right subarticular recess encroachment and mild spinal stenosis. Patent neural foramina.     L4-L5: Disc bulge, eccentric to the left with a central and left central protrusion. This is effacing the left subarticular zone and severely compressing the thecal sac. The nerve roots of the cauda equina are also compressed at this level. Bilateral   facet arthropathy is noted. No foraminal stenosis.     L5-S1: Circumferential disc bulge and endplate osteophytic ridging, more pronounced on the right than the left. Bilateral subarticular zone stenosis with encroachment upon the traversing S1 nerve roots. Bilateral facet arthropathy is noted. Combined with   the right greater than left endplate osteophytic ridging and discogenic disease, there is severe right and moderate left  foraminal stenosis. Correlate for right greater than left L5 radiculitis.     OTHER FINDINGS: Bilateral renal cortical atrophy and cystic change. Prominent intrasinus fat.     IMPRESSION:     Multilevel degenerative discogenic disease, most notably at L4-L5 where there is a central and left central disc protrusion resulting in severe spinal stenosis and compression of the nerve roots of the cauda equina. Surgical consultation is recommended.     Additional findings of degenerative discogenic disease at L5-S1 with endplate osteophytic ridging and facet arthropathy resulting in right greater than left foraminal stenosis and bilateral subarticular zone encroachment. Correlate for right greater than   left L5 and bilateral S1 radiculitis.     The study was marked in EPIC for immediate notification.        Workstation performed: [id number]        Imaging    MRI lumbar spine wo contrast (Order: [id number]) - 1/31/2025    Result History    MRI lumbar spine wo contrast (Order #[id number]) on 1/31/2025 - Order Result History Report    Order Report     Order Details  Order Questions    Question Answer Comment   What body part is requested? Lumbar spine    What is the scan priority? Tier 2 - Within 24 Hours    What is the exam indication? (Please refer to the Reference Link below for more details on each indication) Other low back pain   What is the patient's sedation requirement? No Sedation needs Ativan prior to MRI   Metallic implants? Yes Rt knee replacement   Note: Answer Yes or No   Is the ordering provider the contact for questions? No    What provider can be contacted by the Radiologist if additional information is needed? Dr Vela    Release to patient through [facility]    Is this for a Wake Up Stroke Alert? No    Exam reason lower back pain    Note: Enter reason for exam            Result Information    Status Priority Source   Final result (1/31/2025  2:13 PM) Routine      Other Results from 1/30/2025      Fingerstick Glucose (POCT) Final result 2/4/2025    Fingerstick Glucose (POCT) Final result 2/4/2025    Fingerstick Glucose (POCT) Final result 2/4/2025    Fingerstick Glucose (POCT) Final result 2/4/2025    Basic metabolic panel Final result 2/4/2025    Fingerstick Glucose (POCT) Final result 2/3/2025    Fingerstick Glucose (POCT) Final result 2/3/2025    Fingerstick Glucose (POCT) Final result 2/3/2025    Fingerstick Glucose (POCT) Final result 2/3/2025    CBC and differential Final result 2/3/2025    Basic metabolic panel Final result 2/3/2025    Fingerstick Glucose (POCT) Final result 2/3/2025    Fingerstick Glucose (POCT) Final result 2/2/2025    Fingerstick Glucose (POCT) Final result 2/2/2025    Fingerstick Glucose (POCT) Final result 2/2/2025    Fingerstick Glucose (POCT) Final result 2/2/2025    CBC and differential Final result 2/2/2025    Basic metabolic panel Final result 2/2/2025    Fingerstick Glucose (POCT) Final result 2/2/2025    Fingerstick Glucose (POCT) Final result 2/1/2025    important suggestion  Warning: Additional results from 1/30/2025 are available but are not displayed in this report.       Reason for Exam    lower back pain           MRI lumbar spine wo contrast: Patient Communication     Add Comments   Not seen       Signed by    Signed Date/Time  Phone Pager   SHAH, PALLAV N 1/31/2025 14:13 [phone]        Exam Information    Status Exam Begun  Exam Ended  Performing Tech   Final [99] 1/31/2025 12:45 1/31/2025 13:17 Aisha Patterson       Questionnaire          Question Answer Comment   1. What body part is requested? Lumbar spine    2. What is the scan priority? Tier 2 - Within 24 Hours    3. What is the exam indication? (Please refer to the Reference Link below for more details on each indication) Other low back pain   4. What is the patient's sedation requirement? No Sedation needs Ativan prior to MRI   5. Does the patient have metallic implants, such as a pacemaker or  shunt? Yes Rt knee replacement   6. Is the ordering provider the contact for questions? No    7. What provider can be contacted by the Radiologist if additional information is needed? Dr Vela    8. Release to patient through [facility]    9. Is this for a Wake Up Stroke Alert? No    10. Reason for Exam lower back pain          Begin Exam      IMAGING BEGIN MRI    Question Answer Comment   1. Have you reviewed the MRI Screening Form? Yes    2. Have you performed a Full Stop/Final Check before entering Zone 4? Yes    3. Is the patient wearing a stimulator device? No    4. Device in safe mode - Verifying MRSO / Technologist :          End Exam      [facility] IMAGING END VERIFY IMAGES IN PACS    Question Answer Comment   1. Have you verified the patient's images in PACS? Yes    2. Why have the images not been verified in PACS?           IMAGING END MRI TECH NOTES    Question Answer Comment   1. Patient History: lower back pain, unable to walk    2. Has the patient had prior surgery on this body part? no    3. Any history of cancer? no    4. Add'l Imaging Notes: no    5. Outside images/report information: no    6. Implant clearance information: CGM removed    7. LMP and ORAL contraceptives: n/a    8. Was jewelry removed from the patient? No    9. Jewelry removed:           PATIENT EDUCATION    Question Answer Comment   1. Was the patient educated? Yes    2. Why was the patient not educated?           IMAGING END MRI DEVICE SAFE MODE    Question Answer Comment   1. Is the patient wearing a stimulator device? No    2. Device out of safe mode - Verifying MRSO / Technologist :            Screening Form Questions     important suggestion  Questionnaires are displayed in the order they were answered.      Answer Comment   Has patient changed into the appropriate hospital gown? Yes    What are your symptoms? back pain    Do you have a cardiac pacemaker or internal defibrillator? NA    Please list /make/model:    "  Have you had any HEART surgery? None    Please list make/model:     Heart surgery: If \"other\", please describe:     Have you had any BRAIN surgery? None    Please list  or describe implant or type \"NA\" if not applicable:     Brain surgery: If \"other\", please describe:     Have you had any EYE surgery? None    Eye surgery: If \"other\", please describe:     Have you ever injured your eyes with metal or metal fragments (grinding,metallic slivers)? No    Eye injury: Please describe:     Have you had any EAR surgery? None    Ear surgery: If \"other\", please describe:     Do you have an electronic \"stimulation\" device? None    Please provide  information:     Have you had any abdominal or pelvic surgery? Yes    Have you had any of the following abdominal or pelvic surgeries: Other cholecyctectomy   Abdominal/pelvic surgery: If \"other\", please describe: cholecystectomy    Do you have any ORTHOPEDIC implants/devices? Joint replacement L knee replacement   Do you have the following: None    Do you have liver disease? None    Do you have kidney disease? Diabetic    Have you had a problem with a previous MRI? Yes the noise bothers the patient   Does the patient require pre-medication?     Do you have a personal history of cancer? None    If \"other\", please specify:       Are you wearing medication patches?   No    Are you wearing any of the following: None    For inpatients, do you have the following: None    For inpatients, do you have the following: None freestyle(blood sugar check)   Did the patient provide this information? Yes    Who provided this information (if not the patient)?     Relationship to the patient:     Contact number:     MRI STAFF ONLY- Prior imaging reviewed in PACS (initials): AN    MRI STAFF ONLY- Epic chart reviewed (initials): AN        External Results Report    Open External Results Report      Encounter    View Encounter                     Sedation Documentation Timeline " (1/31/2025 00:00 to 1/31/2025 13:17)    An end event has not been filed for the most recent intervention. Due to this intervention’s length, all data may not appear below. To see all data, file an end event.  1/31/2025 1/31/2025 Event By   01:14:59 Apply surgical shoe to affective extremity Completed MS   Apply surgical shoe to affective extremity         02:11 Specimens Collected MR   Fingerstick Glucose (POCT)  - ID: [id number] Type: Blood         02:11:50 Orders Placed SI   Point of Care Testing-Docked Device  - Fingerstick Glucose (POCT)         02:11:53 Fingerstick Glucose (POCT) Resulted BL   Abnormal Result  Collected: 1/31/2025 02:11  Last updated: 1/31/2025 02:11  Status: Final result  POC Glucose: 184 mg/dl High  [Ref Range: 65 - 140]         02:13 Medication Given MS   HYDROmorphone HCl (DILAUDID) injection 0.2 mg - Dose: 0.2 mg ; Route: Intravenous ; Line: Peripheral IV 01/30/25 Distal;Left;Upper;Ventral (anterior) Arm         02:13 Pain Assessment MS   Pain Assessment   Pain Assessment Tool: 0-10   Pain Score: 7   Pain Location/Orientation: Location: Leg; Location: Back   Pain Onset/Description: Onset: Ongoing; Frequency: Constant/Continuous; Descriptor: Aching   Patient's Stated Pain Goal: No pain   Hospital Pain Intervention(s): Medication (See MAR); Rest         POSS Assessment   Pasero Opioid-Induced Sedation Scale (POSS): Awake and alert         Pain Assessment Timer   Restart Pain Assessment Timer: Yes   02:13:12 Other Flowsheet Documentation MS   Other flowsheet entries   Temperature: 97.1 °F (36.2 °C) Abnormal    Temp Source: Temporal   Pulse: 56   Respirations: 18   SpO2: 95 %   O2 Device: None (Room air)   Heart Rate Source: Monitor   BP Location: Right arm   BP Method: Automatic   Restart Vitals Timer: Yes   Patient Position - Orthostatic VS: Lying   SpO2 Activity: At Rest   02:13:12 Vital Signs DI   Other flowsheet entries   Blood Pressure: 134/58 (Device Time: 02:13:12)   02:21  Medication Given MS   insulin lispro (HumALOG/ADMELOG) 100 units/mL subcutaneous injection 1-5 Units - Dose: 1 Units ; Route: Subcutaneous ; Site: Left Arm ; Scheduled Time: 0200         02:21:51 Orders Completed MS   Fingerstick Glucose (POCT) At 2am         02:21:51 Fingerstick Glucose (POCT) At 2am Completed MS   Fingerstick Glucose (POCT) At 2am         02:43 Pain Assessment MS   Pain Assessment Post Intervention   Reason Not Indicated: Sleeping / Easy to arouse   Post Intervention POSS: Sleep, Easy to arouse         Pain Assessment Timer   Restart Pain Assessment Timer: Yes   03:45 Medication Patch Removed MS   lidocaine (LIDODERM) 5 % patch 1 patch - Dose: 1 patch ; Route: Topical ; Scheduled Time: 0309 ; Comment: removed already         05:28 Medication Given MS   acetaminophen (TYLENOL) tablet 975 mg - Dose: 975 mg ; Route: Oral ; Scheduled Time: 0600         05:28 Pain Assessment MS   Pain Assessment   Pain Assessment Tool: 0-10   Pain Score: 3   Pain Location/Orientation: Location: Back   Pain Onset/Description: Onset: Ongoing; Frequency: Constant/Continuous; Descriptor: Discomfort; Descriptor: Aching   Patient's Stated Pain Goal: No pain   Hospital Pain Intervention(s): Medication (See MAR); Rest         Pain Assessment Timer   Restart Pain Assessment Timer: Yes   05:33 Collect Basic metabolic panel Completed MS   Basic metabolic panel  - Type: Blood ; Source: Hand, Left         05:33 Collect Magnesium Completed MS   Magnesium  - Type: Blood ; Source: Hand, Left         05:33:04 Specimens Collected MS   Basic metabolic panel  - ID: [id number] Type: Blood  Magnesium  - ID: [id number] Type: Blood         05:58:53 Print Label for CBC (Redraw - Quantity Not Sufficient) Completed MS   CBC  - Type: Blood ; Source: Hand, Left         06:37 Collect CBC Completed MS   CBC  - Type: Blood ; Source: Hand, Left         06:37:47 Specimens Collected MS   CBC  - ID: [id number] Type: Blood         06:43:52 CBC  Resulted BL   Abnormal Result  Collected: 1/31/2025 06:37  Last updated: 1/31/2025 06:43  Status: Final result  WBC: 13.33 Thousand/uL High  [Ref Range: 4.31 - 10.16]  RBC: 4.92 Million/uL [Ref Range: 3.88 - 5.62]  Hemoglobin: 15.4 g/dL [Ref Range: 12.0 - 17.0]  Hematocrit: 46.3 % [Ref Range: 36.5 - 49.3]  MCV: 94 fL [Ref Range: 82 - 98]  MCH: 31.3 pg [Ref Range: 26.8 - 34.3]  MCHC: 33.3 g/dL [Ref Range: 31.4 - 37.4]  RDW: 13.6 % [Ref Range: 11.6 - 15.1]  Platelets: 219 Thousands/uL [Ref Range: 149 - 390]  MPV: 9.4 fL [Ref Range: 8.9 - 12.7]         07:05:34 Basic metabolic panel Resulted BL   Abnormal Result  Collected: 1/31/2025 05:33  Last updated: 1/31/2025 07:05  Status: Final result  Sodium: 140 mmol/L [Ref Range: 135 - 147]  Potassium: 3.9 mmol/L [Ref Range: 3.5 - 5.3]  Chloride: 107 mmol/L [Ref Range: 96 - 108]  CO2: 19 mmol/L Low  [Ref Range: 21 - 32]  ANION GAP: 14 mmol/L High  [Ref Range: 4 - 13]  BUN: 30 mg/dL High  [Ref Range: 5 - 25]  Creatinine: 1.82 mg/dL High  [Ref Range: 0.60 - 1.30] (Standardized to IDMS reference method)  Glucose: 175 mg/dL High  [Ref Range: 65 - 140] (If the patient is fasting, the ADA then defines impaired fasting glucose as > 100 mg/dL and diabetes as > or equal to 123 mg/dL.)  Calcium: 9.8 mg/dL [Ref Range: 8.4 - 10.2]  eGFR: 34 ml/min/1.73sq m         07:05:34 Magnesium Resulted BL   Collected: 1/31/2025 05:33  Last updated: 1/31/2025 07:05  Status: Final result  Magnesium: 2.0 mg/dL [Ref Range: 1.9 - 2.7]         07:20 Specimens Collected SC   Fingerstick Glucose (POCT)  - ID: [id number] Type: Blood         07:20:55 Orders Placed SI   Point of Care Testing-Docked Device  - Fingerstick Glucose (POCT)         07:21 Fingerstick Glucose (POCT) Resulted BL   Abnormal Result  Collected: 1/31/2025 07:20  Last updated: 1/31/2025 07:21  Status: Final result  POC Glucose: 221 mg/dl High  [Ref Range: 65 - 140]         08:30 Peripheral IV 01/30/25 Distal;Left;Upper;Ventral (anterior)  Arm Assessment TW   Site Assessment: WDL   Dressing Status: Clean; Dry; Intact   Line Status: Blood return noted; Flushed; Saline locked   Dressing Change Due: 02/03/25   Dressing Type: Transparent   08:33:45 Pain Assessment TW   Pain Assessment   Pain Assessment Tool: 0-10   Pain Score: 0         Pain Assessment Timer   Restart Pain Assessment Timer: Yes   08:33:45 Other Flowsheet Documentation TW   Other flowsheet entries   Temperature: 97.4 °F (36.3 °C) Abnormal    Temp Source: Temporal   Pulse: 60   Respirations: 18   SpO2: 96 %   P.O.: 350 mL   O2 Device: None (Room air)   Heart Rate Source: Monitor   Peripheral Vascular (WDL): Within Defined Limits   Sensory Perceptions: Slightly limited   Moisture: Rarely moist   Activity: Chairfast   Mobility: Slightly limited   Nutrition: Adequate   Friction and Shear: No apparent problem   Ramin Scale Score: 18   Stool Color: Brown   Stool Amount: Medium   Stool Appearance: Soft   Restart Vitals Timer: Yes   Bowel Incontinence: No   General Pressure Injury Prevention Interventions: Encourage Mobilization; Skin assessment, including under medical devices; Turn and reposition every 2 hours; Seating cushion; Elevate heels off of bed; Weight Shifts   SpO2 Activity: At Rest   08:33:45 Vital Signs DI   Other flowsheet entries   Blood Pressure: 134/81 (Device Time: 08:33:45)   08:35 Medication Given TW   allopurinol (ZYLOPRIM) tablet 100 mg - Dose: 100 mg ; Route: Oral ; Scheduled Time: 0900         08:35 Medication Given TW   aspirin (ECOTRIN LOW STRENGTH) EC tablet 81 mg - Dose: 81 mg ; Route: Oral ; Scheduled Time: 0900         08:35 Medication Given TW   enoxaparin (LOVENOX) subcutaneous injection 30 mg - Dose: 30 mg ; Route: Subcutaneous ; Site: Abdominal Tissue ; Scheduled Time: 0900         08:36 Medication Given TW   glipiZIDE (GLUCOTROL) tablet 5 mg - Dose: 5 mg ; Route: Oral ; Scheduled Time: 0900         08:36 Medication Given TW   fish oil capsule 1,000 mg - Dose:  1,000 mg ; Route: Oral ; Scheduled Time: 0900         08:37 Medication Given TW   eplerenone (INSPRA) tablet 25 mg - Dose: 25 mg ; Route: Oral ; Scheduled Time: 0900         08:38 Medication Given TW   losartan (COZAAR) tablet 50 mg - Dose: 50 mg ; Route: Oral ; Scheduled Time: 0900         08:38 Medication Given TW   torsemide (DEMADEX) tablet 20 mg - Dose: 20 mg ; Route: Oral ; Scheduled Time: 0900         08:38 Medication Given TW   saccharomyces boulardii (FLORASTOR) capsule 250 mg - Dose: 250 mg ; Route: Oral ; Scheduled Time: 0900         08:41 Medication Given TW   insulin aspart protamine-insulin aspart (NovoLOG 70/30) 100 units/mL subcutaneous injection 50 Units - Dose: 50 Units ; Route: Subcutaneous ; Site: Abdominal Tissue ; Scheduled Time: 0700         08:41 Medication Given TW   insulin lispro (HumALOG/ADMELOG) 100 units/mL subcutaneous injection 1-5 Units - Dose: 1 Units ; Route: Subcutaneous ; Site: Abdominal Tissue ; Scheduled Time: 0700         08:42:20 Orders Completed SC   Fingerstick Glucose (POCT) Before meals and at bedtime         08:42:20 Fingerstick Glucose (POCT) Before meals and at bedtime Completed SC   Fingerstick Glucose (POCT) Before meals and at bedtime         08:42:22 Orders Completed SC   Fingerstick Glucose (POCT)         08:42:22 Fingerstick Glucose (POCT) Completed SC   Fingerstick Glucose (POCT)         09:50 Pain Assessment JM   Pain Assessment   Pain Assessment Tool: 0-10   Pain Score: (no pain at rest, 9/10 pain L lower anterior leg and R posterior leg with activity)   Pain Location/Orientation: (back/legs at rest)   10:57 Specimens Collected SC   Fingerstick Glucose (POCT)  - ID: [id number] Type: Blood         10:58:17 Orders Placed SI   Point of Care Testing-Docked Device  - Fingerstick Glucose (POCT)         10:58:19 Fingerstick Glucose (POCT) Resulted BL   Abnormal Result  Collected: 1/31/2025 10:57  Last updated: 1/31/2025 10:58  Status: Final result  POC Glucose: 202  mg/dl High  [Ref Range: 65 - 140]         11:36:20 Orders Completed SC   Fingerstick Glucose (POCT) Before meals and at bedtime         11:36:20 Fingerstick Glucose (POCT) Before meals and at bedtime Completed SC   Fingerstick Glucose (POCT) Before meals and at bedtime         11:36:22 Orders Completed SC   Fingerstick Glucose (POCT)         11:36:22 Fingerstick Glucose (POCT) Completed SC   Fingerstick Glucose (POCT)         11:37 Medication Given TW   insulin lispro (HumALOG/ADMELOG) 100 units/mL subcutaneous injection 1-5 Units - Dose: 1 Units ; Route: Subcutaneous ; Site: Left Arm ; Scheduled Time: 1130         11:38 Medication Given TW   LORazepam (ATIVAN) injection 1 mg - Dose: 1 mg ; Route: Intravenous ; Line: Peripheral IV 01/30/25 Distal;Left;Upper;Ventral (anterior) Arm         11:43:08 Orders Placed JS   Lab  - Urinalysis with microscopic         11:44:43 Medication Held by provider JS   torsemide (DEMADEX) tablet 20 mg - Scheduled Time: 1144         11:44:43 Medication Held by provider JS   torsemide (DEMADEX) tablet 10 mg - Scheduled Time: 1144         11:45:01 Orders Placed JS   Medications  - sodium chloride 0.9 % infusion         11:54:25 Print Label for Urinalysis with microscopic  Completed TW   Urinalysis with microscopic  - Type: Urine ; Source: Urine, Clean Catch         11:58 Medication Given TW   HYDROmorphone HCl (DILAUDID) injection 0.2 mg - Dose: 0.2 mg ; Route: Intravenous ; Line: Peripheral IV 01/30/25 Distal;Left;Upper;Ventral (anterior) Arm         11:58 Medication New Bag TW   sodium chloride 0.9 % infusion - Dose: 75 mL/hr ; Rate: 75 mL/hr ; Route: Intravenous ; Line: Peripheral IV 01/30/25 Distal;Left;Upper;Ventral (anterior) Arm ; Scheduled Time: 1200         11:58 Pain Assessment TW   Pain Assessment   Pain Assessment Tool: 0-10   Pain Score: 9   Pain Location/Orientation: Location: Back   Hospital Pain Intervention(s): Medication (See MAR)         POSS Assessment   Arnoldo  Opioid-Induced Sedation Scale (POSS): Awake and alert         Pain Assessment Timer   Restart Pain Assessment Timer: Yes   11:58 Intake/Output TW   Other flowsheet entries   P.O.: 200 mL   Urine: 400 mL   11:59 Collect Urinalysis with microscopic Completed TW   Urinalysis with microscopic  - Type: Urine ; Source: Urine, Clean Catch         11:59:18 Specimens Collected TW   Urinalysis with microscopic  - ID: [id number] Type: Urine         12:01:05 Orders Acknowledged TW   New  - Urinalysis with microscopic; sodium chloride 0.9 % infusion  Held  - torsemide (DEMADEX) tablet 20 mg; torsemide (DEMADEX) tablet 10 mg         12:08:56 Orders Placed JS   Nourishments  - Room Service         12:10:44 Orders Acknowledged TW   New  - Room Service         12:28 Pain Assessment TW   Pain Assessment Post Intervention   Pain Assessment Tool Used:: 0-10   Post Intervention Pain Score: 2   Post Intervention Pain Location/Orientation: Location: Back   Post Intervention POSS: Awake and alert         Pain Assessment Timer   Restart Pain Assessment Timer: Yes   12:47:24 Urinalysis with microscopic Resulted AC   Abnormal Result  Collected: 1/31/2025 11:59  Last updated: 1/31/2025 12:47  Status: Final result  Color, UA: Yellow  Clarity, UA: Clear  Specific Gravity, UA: 1.015 [Ref Range: 1.005 - 1.030]  pH, UA: 5.0 [Ref Range: 4.5, 5.0, 5.5, 6.0, 6.5, 7.0, 7.5, 8.0]  Leukocytes, UA: Negative [Ref Range: Negative]  Nitrite, UA: Negative [Ref Range: Negative]  Protein, UA: Negative mg/dl [Ref Range: Negative]  Glucose, UA: 1000 (1%) mg/dl Abnormal  [Ref Range: Negative] (Elevated glucose may cause false negative leukocyte esterase)  Ketones, UA: Negative mg/dl [Ref Range: Negative]  Urobilinogen, UA: <2.0 mg/dl [Ref Range: <2.0 mg/dl]  Bilirubin, UA: Negative [Ref Range: Negative]  Occult Blood, UA: Negative [Ref Range: Negative]  RBC, UA: 0-1 /hpf Abnormal  [Ref Range: None Seen, 2-4]  WBC, UA: None Seen /hpf [Ref Range: None Seen, 2-4,  5-60]  Epithelial Cells: Occasional /hpf [Ref Range: None Seen, Occasional]  Bacteria, UA: None Seen /hpf [Ref Range: None Seen, Occasional]  Hyaline Casts, UA: 0-1 /lpf Abnormal  [Ref Range: (none)]           Pre-op Summary    Pre-op             Recovery Summary    Recovery                 Routing History    None         No orders to display       No orders of the defined types were placed in this encounter.

## 2025-03-27 NOTE — TELEPHONE ENCOUNTER
PA for DIAZEPAM SUBMITTED to [employer]    via      [x][employer]-Case ID # [id number]       [x]PA sent as URGENT    All office notes, labs and other pertaining documents and studies sent. Clinical questions answered. Awaiting determination from insurance company.     Turnaround time for your insurance to make a decision on your Prior Authorization can take 7-21 business days.

## 2025-03-27 NOTE — TELEPHONE ENCOUNTER
PA for DIAZEPAM APPROVED     Date(s) approved UNTIL 04/26/2025        Patient advised by          []MyChart Message  []Phone call   [x]LMOM  []L/M to call office as no active Communication consent on file  []Unable to leave detailed message as VM not approved on Communication consent       Pharmacy advised by    [x]Fax  []Phone call  []Secure Chat    Specialty Pharmacy    []     Approval letter scanned into Media Yes

## 2025-03-28 ENCOUNTER — TELEPHONE (OUTPATIENT)
Age: 80
End: 2025-03-28

## 2025-04-03 ENCOUNTER — TELEPHONE (OUTPATIENT)
Age: 80
End: 2025-04-03

## 2025-04-03 NOTE — TELEPHONE ENCOUNTER
Spoke to patient's wife. She said patient is feeling pain on the top of the foot. Patient's wife will not put anything on it

## 2025-04-03 NOTE — TELEPHONE ENCOUNTER
Caller: Karolyn     Doctor and/or Office: Dr. White     #: [phone]    Escalation: Care  Karolyn called in stating that Jerel foot is a little sore and wanted to know if there is a cream he can put on it.  He does have a appt on 4/16 I did offer a earlier appt , but he has to many appts and wanted to keep this date.  She stated it is ok to  leave a detailed message on what he can use,  Thank you

## 2025-04-10 ENCOUNTER — TELEPHONE (OUTPATIENT)
Age: 80
End: 2025-04-10

## 2025-04-10 NOTE — TELEPHONE ENCOUNTER
Pt's wife called to find out a time for his procedure tomorrow.  She was advised that they would receive a call after 2 pm today from the surgery center

## 2025-04-11 ENCOUNTER — HOSPITAL ENCOUNTER (OUTPATIENT)
Facility: AMBULARY SURGERY CENTER | Age: 80
Setting detail: OUTPATIENT SURGERY
Discharge: HOME | End: 2025-04-11
Attending: STUDENT IN AN ORGANIZED HEALTH CARE EDUCATION/TRAINING PROGRAM | Admitting: STUDENT IN AN ORGANIZED HEALTH CARE EDUCATION/TRAINING PROGRAM
Payer: COMMERCIAL

## 2025-04-11 LAB — GLUCOSE SERPL-MCNC: 361 MG/DL (ref 65–140)

## 2025-04-11 PROCEDURE — 82948 REAGENT STRIP/BLOOD GLUCOSE: CPT

## 2025-04-16 ENCOUNTER — OFFICE VISIT (OUTPATIENT)
Age: 80
End: 2025-04-16

## 2025-04-16 VITALS — HEIGHT: 68 IN | BODY MASS INDEX: 33.8 KG/M2 | WEIGHT: 223 LBS

## 2025-04-16 DIAGNOSIS — M21.962 FOOT DEFORMITY, ACQUIRED, LEFT: ICD-10-CM

## 2025-04-16 DIAGNOSIS — Z98.890 POST-OPERATIVE STATE: Primary | ICD-10-CM

## 2025-04-16 DIAGNOSIS — M14.672 CHARCOT JOINT OF LEFT FOOT: ICD-10-CM

## 2025-04-16 PROCEDURE — 99024 POSTOP FOLLOW-UP VISIT: CPT | Performed by: STUDENT IN AN ORGANIZED HEALTH CARE EDUCATION/TRAINING PROGRAM

## 2025-04-16 NOTE — PROGRESS NOTES
"[facility] Podiatric Medicine and Surgery Office Visit    ASSESSMENT     Diagnoses and all orders for this visit:    Post-operative state    Charcot joint of left foot    Foot deformity, acquired, left         Problem List Items Addressed This Visit    None  Visit Diagnoses         Post-operative state    -  Primary      Charcot joint of left foot          Foot deformity, acquired, left              PLAN  Stewart and I discussed his left foot.  His previous area of ulceration remains fully healed at this time.  I recommend that he continue to wear white socks, self examine his feet daily, maintain tight glucose control, and wear his custom molded diabetic sneakers with custom molded offloading inserts.  He was encouraged to call my office with any questions or concerns.  He will return to my office in 6 months for repeat examination and diabetic foot examination.    SUBJECTIVE    Chief Complaint:  Diabetic foot examination     Patient ID: Stewart William presents today 12 weeks post left foot excision exostosis. He states that during the day he has no pain however at night when he puts his feet up he has shooting pains after being on his feet all day. He then takes some Asprin which is very helpful. He also mentions that he has some pain around his ankle.         The following portions of the patient's history were reviewed and updated as appropriate: allergies, current medications, past family history, past medical history, past social history, past surgical history and problem list.    Review of Systems   Constitutional: Negative.    Respiratory: Negative.     Cardiovascular: Negative.    Gastrointestinal: Negative.    Genitourinary: Negative.    Musculoskeletal: Negative.    Skin: Negative.          OBJECTIVE      Ht 5' 8\" (1.727 m)   Wt 101 kg (223 lb)   BMI 33.91 kg/m²        Physical Exam  Constitutional:       Appearance: Normal appearance. He is not ill-appearing or diaphoretic.   HENT:      " Head: Normocephalic and atraumatic.   Eyes:      General:         Right eye: No discharge.         Left eye: No discharge.   Pulmonary:      Effort: Pulmonary effort is normal. No respiratory distress.   Musculoskeletal:      Comments: Left foot deformity noted with hallux abductovalgus, pes planus/slight rocker-bottom deformity noted with plantar medial prominence at the level of the midfoot secondary to Charcot neuroarthropathy.  On standing exam it is noted that the forefoot has an severe abduction as well as dorsiflexion compared to the rear foot at the left foot.  The ankle joint remains rectus as well as the subtalar joint.   Skin:     Capillary Refill: Capillary refill takes less than 2 seconds.      Comments: Left foot wound remains fully healed at this time without any local signs of infection or open areas   Neurological:      Mental Status: He is alert.      Sensory: Sensory deficit (7/10 locations felt with monofilament of the left foot) present.   Psychiatric:         Mood and Affect: Mood normal.

## 2025-04-17 NOTE — TELEPHONE ENCOUNTER
Caller: Karolyn, patient's wife    Doctor/Office: Dr Johnson    Call regarding :  returning call to nurse after being disconnected     Call was transferred to: triage nurse

## 2025-04-17 NOTE — TELEPHONE ENCOUNTER
Please advise when pt can be rescheduled. Requesting additional Diazepam  Saw endo 4/14/25, note as follows      Telephone Encounter - ELMER Garcia - 04/14/2025 4:34 PM EDT  Formatting of this note might be different from the original.  Spoke with wife Karolyn. She reports sugars have been ranging 180-400.  Cannot receive epidural until sugars are in better control.  Advised increase to Humalog 75/25 60 units AM and 58 units PM.  Additionally, will increase Farxiga to 10 mg daily.  Scripts updated at [facility].  Electronically signed by ELMER Garcia at 04/14/2025 4:35 PM EDT    Last Ha1c 8.2 on 12/30/24

## 2025-04-17 NOTE — TELEPHONE ENCOUNTER
Caller: zoie Benson's wife    Doctor: Dr. Ceballos    Reason for call: returning the nurse's call    Call back#: [phone]       Very bad connection on call.  I could not hear pt at all

## 2025-04-17 NOTE — TELEPHONE ENCOUNTER
S/w pt's wife Karolyn(ok per medical communication consent on file and advised of same.) Wife will continue to monitor BS's and communication with endo to achieve consistent BS <200. She will cb to schedule at that time.    Verbalized understanding and appreciative of call.     Pt did not like the way he felt after taking Diazepam and does not require another script for procedure when scheduled.

## 2025-04-17 NOTE — TELEPHONE ENCOUNTER
Lukasz Ceballos MD  You9 minutes ago (11:15 AM)       We cannot reschedule until patient's blood sugars are better controlled. Consistently less than 200. Once this is the case he can give us a call. Once scheduled we can then send diazepam to take prior to the procedure.

## 2025-04-17 NOTE — TELEPHONE ENCOUNTER
Pt wife called asking when is pt procedure going to be scheduled as she is supposed to get a call today about it, he would like a elizabeth gu for him     Pt can be reached at [phone]

## 2025-04-17 NOTE — TELEPHONE ENCOUNTER
Caller: pt    Doctor: Dr. baird    Reason for call: pt needs to r/s asap. Thanks pt also needs another diazepam pill.    Call back#: [phone]

## 2025-04-23 ENCOUNTER — TELEPHONE (OUTPATIENT)
Age: 80
End: 2025-04-23

## 2025-04-23 NOTE — TELEPHONE ENCOUNTER
Pt wife called to reschedule 5/6/25 appt out at least 3 weeks, there was a 3 week delay in the JANETTE appt due to pt Glucose issue. Appt moved to 6/9/25 and pt wife will advise if JANETTE gets delayed again.

## 2025-05-20 ENCOUNTER — HOSPITAL ENCOUNTER (OUTPATIENT)
Facility: AMBULARY SURGERY CENTER | Age: 80
Setting detail: OUTPATIENT SURGERY
Discharge: HOME | End: 2025-05-20
Attending: STUDENT IN AN ORGANIZED HEALTH CARE EDUCATION/TRAINING PROGRAM | Admitting: STUDENT IN AN ORGANIZED HEALTH CARE EDUCATION/TRAINING PROGRAM
Payer: COMMERCIAL

## 2025-05-20 ENCOUNTER — APPOINTMENT (OUTPATIENT)
Dept: RADIOLOGY | Facility: HOSPITAL | Age: 80
End: 2025-05-20
Payer: COMMERCIAL

## 2025-05-20 VITALS
DIASTOLIC BLOOD PRESSURE: 69 MMHG | TEMPERATURE: 97.6 F | HEART RATE: 71 BPM | SYSTOLIC BLOOD PRESSURE: 119 MMHG | RESPIRATION RATE: 18 BRPM | OXYGEN SATURATION: 97 %

## 2025-05-20 PROBLEM — M54.16 LUMBAR RADICULOPATHY: Status: ACTIVE | Noted: 2025-05-20

## 2025-05-20 LAB — GLUCOSE SERPL-MCNC: 126 MG/DL (ref 65–140)

## 2025-05-20 PROCEDURE — NC001 PR NO CHARGE: Performed by: STUDENT IN AN ORGANIZED HEALTH CARE EDUCATION/TRAINING PROGRAM

## 2025-05-20 PROCEDURE — 82948 REAGENT STRIP/BLOOD GLUCOSE: CPT

## 2025-05-20 PROCEDURE — 62323 NJX INTERLAMINAR LMBR/SAC: CPT | Performed by: STUDENT IN AN ORGANIZED HEALTH CARE EDUCATION/TRAINING PROGRAM

## 2025-05-20 RX ORDER — BUPIVACAINE HYDROCHLORIDE 2.5 MG/ML
INJECTION, SOLUTION EPIDURAL; INFILTRATION; INTRACAUDAL; PERINEURAL AS NEEDED
Status: DISCONTINUED | OUTPATIENT
Start: 2025-05-20 | End: 2025-05-20 | Stop reason: HOSPADM

## 2025-05-20 RX ORDER — METHYLPREDNISOLONE ACETATE 80 MG/ML
INJECTION, SUSPENSION INTRA-ARTICULAR; INTRALESIONAL; INTRAMUSCULAR; SOFT TISSUE AS NEEDED
Status: DISCONTINUED | OUTPATIENT
Start: 2025-05-20 | End: 2025-05-20 | Stop reason: HOSPADM

## 2025-05-20 RX ORDER — LIDOCAINE HYDROCHLORIDE 10 MG/ML
INJECTION, SOLUTION EPIDURAL; INFILTRATION; INTRACAUDAL; PERINEURAL AS NEEDED
Status: DISCONTINUED | OUTPATIENT
Start: 2025-05-20 | End: 2025-05-20 | Stop reason: HOSPADM

## 2025-05-20 NOTE — H&P
History of Present Illness: The patient is a 79 y.o. male who presents with complaints of low back pain.     Past Medical History:   Diagnosis Date    Arthritis     Chronic kidney disease     Diabetes mellitus (HCC)     History of wound infection ?2013    RIGHT LOWER LEG. WAS + FOR STAPH INFECTION AT THAT TIME    Hypertension     Shoulder abrasion     right side after a fall in Feb 2018       Past Surgical History:   Procedure Laterality Date    BONE EXOSTOSIS EXCISION Left 1/21/2025    Procedure: EXCISION EXOSTOSIS/saucerization left foot;  Surgeon: Ollie White DPM;  Location: [facility];  Service: Podiatry    CHOLECYSTECTOMY      COLONOSCOPY      KNEE ARTHROPLASTY Left 2014    DE EXC B9 LESION MRGN XCP SK TG S/N/H/F/G 1.1-2.0CM Left 07/30/2018    Procedure: EXCISIONAL BIOPSY BENIGN NEOPLASM OF SKIN EXTREMITY;  Surgeon: Julio Cesar Trejo Jr., DPM;  Location: [facility];  Service: Podiatry    STEROID INJECTION SHOULDER Right     8 CERIVAL SPINE INJECTIONS    TONSILLECTOMY         Current Medications[1]    Allergies[2]    Physical Exam:   Vitals:    05/20/25 0715   BP: 138/98   Pulse: 72   Resp: 18   Temp: 97.6 °F (36.4 °C)   SpO2: 95%     General: Awake, Alert, Oriented x 3, Mood and affect appropriate  Respiratory: Respirations even and unlabored  Cardiovascular: Peripheral pulses intact; no edema  Musculoskeletal Exam: low back pain.     ASA Score: 3    Patient/Chart Verification  Patient ID Verified: Verbal, Armband  H&P( within 30 days) Verified: Yes  Interval H&P(within 24 hr) Complete (required for Outpatients and Surgery Admit only): Yes  Allergies Reviewed: Yes    Assessment: Lumbar radiculopathy    Plan: L5-S1 LESI         [1] No current facility-administered medications for this encounter.  [2]   Allergies  Allergen Reactions    Ibuprofen Other (See Comments)     To avoid due to kidney problems

## 2025-05-20 NOTE — OP NOTE
Pre-procedure Diagnosis: Lumbar radiculopathy  Post-procedure Diagnosis: Lumbar radiculopathy  Procedure Title(s):  1.  L5-S1 interlaminar epidural steroid injection      2. Intraoperative fluoroscopy  Attending Surgeon:   Lukasz Ceballos MD  Anesthesia:   Local     Indications: The patient is a 79 y.o. year-old male with a diagnosis of lumbar radiculopathy. The patient's history and physical exam were reviewed.  The risks, benefits and alternatives to the procedure were discussed, and all questions were answered to the patient's satisfaction. The patient agreed to proceed, and written informed consent was obtained.    Procedure in Detail: The patient was brought into the procedure room and placed in the prone position on the fluoroscopy table. The area of the lumbar spine was prepped with chlorhexidine gluconate solution times one and draped in a sterile manner.    The L5/S1 interspace was identified and marked under AP fluoroscopy. The skin and subcutaneous tissues in the area were anesthetized with 1% lidocaine. A 20-gauge Tuohy epidural needle was directed toward the interspace under fluoroscopic guidance until the ligamentum flavum was engaged. From this point, a loss of resistance technique with air was used to identify entrance of the needle into the epidural space. Once an appropriate loss was obtained, negative aspiration was confirmed, and 1 ml Omnipaque 300 contrast solution was injected. An appropriate epidurogram was noted.    Then, after negative aspiration, a solution consisting of 1-mL depo-medrol (80mg/mL) and 1-mL bupivacaine 0.25% and 3-mL preservative-free saline was easily injected. The needle was removed with a 1% lidocaine flush. The patient's back was cleaned and a bandage was placed over the site of needle insertion.    Disposition: The patient tolerated the procedure well, and there were no apparent complications. The patient was taken to the recovery area where written discharge  instructions for the procedure were given.     Estimated Blood Loss: None  Specimens Obtained: N/A

## 2025-06-03 ENCOUNTER — TELEPHONE (OUTPATIENT)
Dept: PAIN MEDICINE | Facility: MEDICAL CENTER | Age: 80
End: 2025-06-03

## 2025-06-03 ENCOUNTER — TELEPHONE (OUTPATIENT)
Dept: NEUROSURGERY | Facility: CLINIC | Age: 80
End: 2025-06-03

## 2025-06-03 NOTE — TELEPHONE ENCOUNTER
Pt called back and advised 7/8/25 at [facility] in [place] is OK with them.

## 2025-06-12 ENCOUNTER — TELEPHONE (OUTPATIENT)
Age: 80
End: 2025-06-12

## 2025-06-12 NOTE — TELEPHONE ENCOUNTER
Caller: Karolyn Flores    Doctor and/or Office: Dr. White/[facility]#: [phone]    Escalation: Care Calling on behalf of Stewart. His foot looks great overall but every once and awhile he gets a burning sensation where the wound was. Is this normal? It comes and goes. Is there anything he could put on it? Please return call. Thank you

## 2025-06-13 ENCOUNTER — TELEPHONE (OUTPATIENT)
Age: 80
End: 2025-06-13

## 2025-06-13 NOTE — TELEPHONE ENCOUNTER
SW pt wife, made appt. I will talk with Dr White Monday to see if pt needs to be seen sooner. She was thankful.

## 2025-06-13 NOTE — TELEPHONE ENCOUNTER
Spoke with rubina in regards to her  per dr beaver he can try taking gabapentin twice daily , wife stated he is already taking gabapentin 3 x daily .

## 2025-06-30 ENCOUNTER — APPOINTMENT (OUTPATIENT)
Dept: RADIOLOGY | Facility: CLINIC | Age: 80
End: 2025-06-30
Attending: STUDENT IN AN ORGANIZED HEALTH CARE EDUCATION/TRAINING PROGRAM
Payer: COMMERCIAL

## 2025-06-30 ENCOUNTER — OFFICE VISIT (OUTPATIENT)
Dept: PAIN MEDICINE | Facility: CLINIC | Age: 80
End: 2025-06-30
Payer: COMMERCIAL

## 2025-06-30 DIAGNOSIS — M54.9 MID BACK PAIN: ICD-10-CM

## 2025-06-30 DIAGNOSIS — M47.816 LUMBAR SPONDYLOSIS: ICD-10-CM

## 2025-06-30 DIAGNOSIS — G89.29 CHRONIC RIGHT SHOULDER PAIN: ICD-10-CM

## 2025-06-30 DIAGNOSIS — M47.814 THORACIC SPONDYLOSIS: Primary | ICD-10-CM

## 2025-06-30 DIAGNOSIS — M25.511 CHRONIC RIGHT SHOULDER PAIN: ICD-10-CM

## 2025-06-30 PROCEDURE — 72072 X-RAY EXAM THORAC SPINE 3VWS: CPT

## 2025-06-30 PROCEDURE — 99214 OFFICE O/P EST MOD 30 MIN: CPT | Performed by: STUDENT IN AN ORGANIZED HEALTH CARE EDUCATION/TRAINING PROGRAM

## 2025-06-30 PROCEDURE — G2211 COMPLEX E/M VISIT ADD ON: HCPCS | Performed by: STUDENT IN AN ORGANIZED HEALTH CARE EDUCATION/TRAINING PROGRAM

## 2025-06-30 NOTE — H&P (VIEW-ONLY)
Name: Stewart Flores      : 1945      MRN: [id number]  Encounter Provider: Lukasz Ceballos MD  Encounter Date: 2025   Encounter department: [facility]  :  Assessment & Plan  Thoracic spondylosis         Lumbar spondylosis         Mid back pain    Orders:  •  X-ray thoracic spine 3 views; Future    Chronic right shoulder pain    Orders:  •  Ambulatory referral to Orthopedic Surgery; Future    Patient is a pleasant 79-year-old male who presents as a follow-up visit after last being seen On 2025 when he underwent an L5-S1 lumbar epidural steroid injection.  Patient reports ongoing relief from this injection but is now having different pains more in the mid back.  He rates the pain is 7 out of 10 on numeric rating scale and describes it as a dull ache sensation.  The pain does interfere with his daily activities and he has been doing home exercises.    X-ray of the thoracic spine independent reviewed and discussed with patient with does show mild spondylosis in the lower thoracic spine.  On examination patient with tenderness to palpation in the lower thoracic and upper lumbar spine consistent with his x-ray findings along with his MRI of the lumbar spine which does demonstrate facet arthropathy at L1 and L2.  Given this I think is reasonable to schedule patient for T12, L1, L2 MBB to RFA pathway.  Patient counseled risk and benefits of this injection pathway. For symptomatic relief patient to continue with use of gabapentin 300 mg 3 times daily along with Lidoderm patches and APAP 975 mg 3 times daily as needed.     Patient also with right shoulder pain and he was receiving injection therapy with [facility].  Patient states he has received about 6 injections over the past couple years but these injections do acutely raise his blood sugar so he is concerned about this.  Patient is interested in referral to orthopedics for consideration for this we will place a  new referral to Dr. Cory Ceballos.    Complete risks and benefits including bleeding, infection, tissue reaction, nerve injury and allergic reaction were discussed. The approach was demonstrated using models and literature was provided. Verbal and written consent was obtained.    My impressions and treatment recommendations were discussed in detail with the patient who verbalized understanding and had no further questions.  Discharge instructions were provided. I personally saw and examined the patient and I agree with the above discussed plan of care.    History of Present Illness     Stewart Flores is a 79 y.o. male who presents for a follow up office visit in regards to No chief complaint on file.    Patient is a pleasant 79-year-old male who presents as a follow-up visit after last being seen On 5/20/2025 when he underwent an L5-S1 lumbar epidural steroid injection.  Patient reports ongoing relief from this injection but is now having different pains more in the mid back.  He rates the pain is 7 out of 10 on numeric rating scale and describes it as a dull ache sensation.  The pain does interfere with his daily activities and he has been doing home exercises.        Opioid contract date    Last UDS Date: No results in last 5 years                    last taken on    Review of Systems   Constitutional:  Negative for unexpected weight change.   HENT:  Negative for ear pain.    Eyes:  Negative for visual disturbance.   Respiratory:  Negative for shortness of breath and wheezing.    Gastrointestinal:  Negative for abdominal pain.   Musculoskeletal:  Positive for back pain and gait problem.   Neurological:  Positive for weakness. Negative for numbness.   Psychiatric/Behavioral:  Positive for sleep disturbance. Negative for decreased concentration. The patient is nervous/anxious.        Medical History Reviewed by provider this encounter:  Tobacco  Allergies  Meds  Problems  Med Hx  Surg Hx  Fam Hx     .        Objective   There were no vitals taken for this visit.     Pain Score:   7  Physical Exam  Constitutional: normal, well developed, well nourished, alert, in no distress and non-toxic and no overt pain behavior.  Eyes: anicteric  HEENT: grossly intact  Neck: supple, symmetric, trachea midline and no masses   Pulmonary: even and unlabored  Cardiovascular: No edema or pitting edema present  Skin: Normal without rashes or lesions and well hydrated  Psychiatric: Mood and affect appropriate  Neurologic: Cranial Nerves II-XII grossly intact  Musculoskeletal: in wheelchair. TTP in lower thoracic and upper lumbar spine with positive facet loading.

## 2025-06-30 NOTE — PROGRESS NOTES
Name: Stewart Flores      : 1945      MRN: [id number]  Encounter Provider: Lukasz Ceballos MD  Encounter Date: 2025   Encounter department: [facility] SP[facility]IN [facility]  :  Assessment & Plan  Thoracic spondylosis         Lumbar spondylosis         Mid back pain    Orders:  •  X-ray thoracic spine 3 views; Future    Chronic right shoulder pain    Orders:  •  Ambulatory referral to Orthopedic Surgery; Future    Patient is a pleasant 79-year-old male who presents as a follow-up visit after last being seen On 2025 when he underwent an L5-S1 lumbar epidural steroid injection.  Patient reports ongoing relief from this injection but is now having different pains more in the mid back.  He rates the pain is 7 out of 10 on numeric rating scale and describes it as a dull ache sensation.  The pain does interfere with his daily activities and he has been doing home exercises.    X-ray of the thoracic spine independent reviewed and discussed with patient with does show mild spondylosis in the lower thoracic spine.  On examination patient with tenderness to palpation in the lower thoracic and upper lumbar spine consistent with his x-ray findings along with his MRI of the lumbar spine which does demonstrate facet arthropathy at L1 and L2.  Given this I think is reasonable to schedule patient for T12, L1, L2 MBB to RFA pathway.  Patient counseled risk and benefits of this injection pathway. For symptomatic relief patient to continue with use of gabapentin 300 mg 3 times daily along with Lidoderm patches and APAP 975 mg 3 times daily as needed.     Patient also with right shoulder pain and he was receiving injection therapy with [facility].  Patient states he has received about 6 injections over the past couple years but these injections do acutely raise his blood sugar so he is concerned about this.  Patient is interested in referral to orthopedics for consideration for this we will place a  new referral to Dr. Cory Ceballos.    Complete risks and benefits including bleeding, infection, tissue reaction, nerve injury and allergic reaction were discussed. The approach was demonstrated using models and literature was provided. Verbal and written consent was obtained.    My impressions and treatment recommendations were discussed in detail with the patient who verbalized understanding and had no further questions.  Discharge instructions were provided. I personally saw and examined the patient and I agree with the above discussed plan of care.    History of Present Illness     Stewart Flores is a 79 y.o. male who presents for a follow up office visit in regards to No chief complaint on file.    Patient is a pleasant 79-year-old male who presents as a follow-up visit after last being seen On 5/20/2025 when he underwent an L5-S1 lumbar epidural steroid injection.  Patient reports ongoing relief from this injection but is now having different pains more in the mid back.  He rates the pain is 7 out of 10 on numeric rating scale and describes it as a dull ache sensation.  The pain does interfere with his daily activities and he has been doing home exercises.        Opioid contract date    Last UDS Date: No results in last 5 years                    last taken on    Review of Systems   Constitutional:  Negative for unexpected weight change.   HENT:  Negative for ear pain.    Eyes:  Negative for visual disturbance.   Respiratory:  Negative for shortness of breath and wheezing.    Gastrointestinal:  Negative for abdominal pain.   Musculoskeletal:  Positive for back pain and gait problem.   Neurological:  Positive for weakness. Negative for numbness.   Psychiatric/Behavioral:  Positive for sleep disturbance. Negative for decreased concentration. The patient is nervous/anxious.        Medical History Reviewed by provider this encounter:  Tobacco  Allergies  Meds  Problems  Med Hx  Surg Hx  Fam Hx     .        Objective   There were no vitals taken for this visit.     Pain Score:   7  Physical Exam  Constitutional: normal, well developed, well nourished, alert, in no distress and non-toxic and no overt pain behavior.  Eyes: anicteric  HEENT: grossly intact  Neck: supple, symmetric, trachea midline and no masses   Pulmonary: even and unlabored  Cardiovascular: No edema or pitting edema present  Skin: Normal without rashes or lesions and well hydrated  Psychiatric: Mood and affect appropriate  Neurologic: Cranial Nerves II-XII grossly intact  Musculoskeletal: in wheelchair. TTP in lower thoracic and upper lumbar spine with positive facet loading.

## 2025-07-01 ENCOUNTER — TELEPHONE (OUTPATIENT)
Dept: PAIN MEDICINE | Facility: CLINIC | Age: 80
End: 2025-07-01

## 2025-07-01 NOTE — TELEPHONE ENCOUNTER
Caller: Stewart     Doctor/Office: Dr Johnson     Call regarding :        Call was transferred to: Warm transfer to

## 2025-07-03 ENCOUNTER — OFFICE VISIT (OUTPATIENT)
Dept: OBGYN CLINIC | Facility: CLINIC | Age: 80
End: 2025-07-03
Attending: STUDENT IN AN ORGANIZED HEALTH CARE EDUCATION/TRAINING PROGRAM
Payer: COMMERCIAL

## 2025-07-03 ENCOUNTER — APPOINTMENT (OUTPATIENT)
Dept: RADIOLOGY | Facility: CLINIC | Age: 80
End: 2025-07-03
Attending: ORTHOPAEDIC SURGERY
Payer: COMMERCIAL

## 2025-07-03 VITALS — HEIGHT: 68 IN | BODY MASS INDEX: 33.91 KG/M2

## 2025-07-03 DIAGNOSIS — M25.512 BILATERAL SHOULDER PAIN, UNSPECIFIED CHRONICITY: ICD-10-CM

## 2025-07-03 DIAGNOSIS — M19.011 OSTEOARTHRITIS OF GLENOHUMERAL JOINT, RIGHT: Primary | ICD-10-CM

## 2025-07-03 DIAGNOSIS — G89.29 CHRONIC RIGHT SHOULDER PAIN: ICD-10-CM

## 2025-07-03 DIAGNOSIS — M19.011 OSTEOARTHRITIS OF BILATERAL GLENOHUMERAL JOINTS: ICD-10-CM

## 2025-07-03 DIAGNOSIS — M25.511 BILATERAL SHOULDER PAIN, UNSPECIFIED CHRONICITY: ICD-10-CM

## 2025-07-03 DIAGNOSIS — M19.012 OSTEOARTHRITIS OF LEFT GLENOHUMERAL JOINT: ICD-10-CM

## 2025-07-03 DIAGNOSIS — M25.511 CHRONIC RIGHT SHOULDER PAIN: ICD-10-CM

## 2025-07-03 DIAGNOSIS — M19.012 OSTEOARTHRITIS OF BILATERAL GLENOHUMERAL JOINTS: ICD-10-CM

## 2025-07-03 PROCEDURE — 73030 X-RAY EXAM OF SHOULDER: CPT

## 2025-07-03 PROCEDURE — 20610 DRAIN/INJ JOINT/BURSA W/O US: CPT

## 2025-07-03 PROCEDURE — 99213 OFFICE O/P EST LOW 20 MIN: CPT | Performed by: ORTHOPAEDIC SURGERY

## 2025-07-03 RX ORDER — BUPIVACAINE HYDROCHLORIDE 5 MG/ML
1 INJECTION, SOLUTION PERINEURAL
Status: COMPLETED | OUTPATIENT
Start: 2025-07-03 | End: 2025-07-03

## 2025-07-03 RX ORDER — SEMAGLUTIDE 0.68 MG/ML
INJECTION, SOLUTION SUBCUTANEOUS
COMMUNITY

## 2025-07-03 RX ORDER — TRIAMCINOLONE ACETONIDE 40 MG/ML
40 INJECTION, SUSPENSION INTRA-ARTICULAR; INTRAMUSCULAR
Status: COMPLETED | OUTPATIENT
Start: 2025-07-03 | End: 2025-07-03

## 2025-07-03 RX ADMIN — BUPIVACAINE HYDROCHLORIDE 1 ML: 5 INJECTION, SOLUTION PERINEURAL at 09:00

## 2025-07-03 RX ADMIN — TRIAMCINOLONE ACETONIDE 40 MG: 40 INJECTION, SUSPENSION INTRA-ARTICULAR; INTRAMUSCULAR at 09:00
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
## 2025-07-11 ENCOUNTER — TELEPHONE (OUTPATIENT)
Age: 80
End: 2025-07-11

## 2025-07-11 NOTE — TELEPHONE ENCOUNTER
Caller: Karolyn Flores    Doctor: Dr. White    Reason for call: appt/checked for date last seen/25/scheduled    Call back#: NA

## 2025-07-17 ENCOUNTER — TELEPHONE (OUTPATIENT)
Age: 80
End: 2025-07-17

## 2025-07-17 NOTE — TELEPHONE ENCOUNTER
Caller: Karolyn ( pt spouse)    Doctor: Dr. Ceballos     Reason for call: Karolyn called to find out the arrival time for pt's procedure, I did advise that the nurse from the out patient will be giving the pt a call with the arrival time    Call back#: [phone]

## 2025-07-17 NOTE — TELEPHONE ENCOUNTER
Spoke with spouse informed to arrive at 930am and also listen to pts voicemail for message from Surgical center

## 2025-07-17 NOTE — TELEPHONE ENCOUNTER
Caller: Stewart Wife Karolyn     Doctor: Dr. Ceballos     Reason for call: Patient wife called asking if I can provide procedure time. I did advise patient will get a call after 3pm to confirm time and location.     Call back#: [phone]

## 2025-07-18 ENCOUNTER — HOSPITAL ENCOUNTER (OUTPATIENT)
Facility: AMBULARY SURGERY CENTER | Age: 80
Setting detail: OUTPATIENT SURGERY
Discharge: HOME | End: 2025-07-18
Attending: STUDENT IN AN ORGANIZED HEALTH CARE EDUCATION/TRAINING PROGRAM | Admitting: STUDENT IN AN ORGANIZED HEALTH CARE EDUCATION/TRAINING PROGRAM
Payer: COMMERCIAL

## 2025-07-18 ENCOUNTER — APPOINTMENT (OUTPATIENT)
Dept: RADIOLOGY | Facility: HOSPITAL | Age: 80
End: 2025-07-18
Payer: COMMERCIAL

## 2025-07-18 VITALS
RESPIRATION RATE: 18 BRPM | DIASTOLIC BLOOD PRESSURE: 71 MMHG | HEART RATE: 70 BPM | TEMPERATURE: 97.8 F | OXYGEN SATURATION: 97 % | SYSTOLIC BLOOD PRESSURE: 134 MMHG

## 2025-07-18 PROBLEM — M47.814 THORACIC SPONDYLOSIS: Status: ACTIVE | Noted: 2025-07-18

## 2025-07-18 PROBLEM — M47.816 LUMBAR SPONDYLOSIS: Status: ACTIVE | Noted: 2025-07-18

## 2025-07-18 LAB — GLUCOSE SERPL-MCNC: 155 MG/DL (ref 65–140)

## 2025-07-18 PROCEDURE — 64493 INJ PARAVERT F JNT L/S 1 LEV: CPT | Performed by: STUDENT IN AN ORGANIZED HEALTH CARE EDUCATION/TRAINING PROGRAM

## 2025-07-18 PROCEDURE — 82948 REAGENT STRIP/BLOOD GLUCOSE: CPT

## 2025-07-18 PROCEDURE — 64490 INJ PARAVERT F JNT C/T 1 LEV: CPT | Performed by: STUDENT IN AN ORGANIZED HEALTH CARE EDUCATION/TRAINING PROGRAM

## 2025-07-18 RX ORDER — BUPIVACAINE HYDROCHLORIDE 2.5 MG/ML
INJECTION, SOLUTION EPIDURAL; INFILTRATION; INTRACAUDAL; PERINEURAL AS NEEDED
Status: DISCONTINUED | OUTPATIENT
Start: 2025-07-18 | End: 2025-07-18 | Stop reason: HOSPADM

## 2025-07-18 RX ORDER — LIDOCAINE HYDROCHLORIDE 10 MG/ML
INJECTION, SOLUTION EPIDURAL; INFILTRATION; INTRACAUDAL; PERINEURAL AS NEEDED
Status: DISCONTINUED | OUTPATIENT
Start: 2025-07-18 | End: 2025-07-18 | Stop reason: HOSPADM

## 2025-07-18 NOTE — OP NOTE
Pre-procedure Diagnosis: Lumbar Facet Arthropathy  Post-procedure Diagnosis: Lumbar Facet Arthropathy  Procedure Title(s):  bilateral T12-L2 medial branch nerve blocks #1  Attending Surgeon:   Lukasz Ceballos MD  Anesthesia:   Local     Indications: The patient is a 79 y.o. year-old male with a diagnosis of lumbar facet arthropathy. The patient's history and physical exam were reviewed. The risks, benefits and alternatives to the procedure were discussed, and all questions were answered to the patient's satisfaction. The patient agreed to proceed, and written informed consent was obtained.    Procedure in Detail: The patient was brought into the procedure room and placed in the prone position on the fluoroscopy table. The area of the lumbar spine was prepped with chloraprep and then draped in a sterile manner.      AP fluoroscopy was used to identify the T12, L1, L2, levels on the [ right side. The C-arm was obliqued to visualize the junction of the superior articulate process and transverse process.  The sacral ala was identified and marked. The skin in these identified areas was anesthetized with 1% lidocaine. A 25-gauge, 3½-inch spinal needle was advanced toward each of these points under fluoroscopic guidance. Once bone was contacted, negative aspiration was confirmed and [0.5-mL] of [bupivacaine 0.25%] was injected at each level.    (The same procedure was performed on the opposite side.)    After the procedure was completed, the patient's back was cleaned and bandages were placed at the needle insertion sites.    Disposition: The patient tolerated the procedure well, and there were no apparent complications. The patient was taken to the recovery area where written discharge instructions for the procedure were given. The patient was given a pain diary to determine if the patient's pain improves following the injection. Once the diary is returned we will consider next appropriate course of  treatment.    Estimated Blood Loss: None  Specimens Obtained: N/A

## 2025-07-18 NOTE — INTERVAL H&P NOTE
H&P reviewed. After examining the patient I find no changes in the patients condition since the H&P had been written.    Vitals:    07/18/25 0936   BP: 136/77   Pulse: 70   Resp: 18   Temp: 97.8 °F (36.6 °C)   SpO2: 97%

## 2025-07-18 NOTE — DISCHARGE INSTRUCTIONS

## 2025-07-21 ENCOUNTER — OFFICE VISIT (OUTPATIENT)
Age: 80
End: 2025-07-21
Payer: COMMERCIAL

## 2025-07-21 VITALS — HEIGHT: 68 IN | WEIGHT: 223 LBS | BODY MASS INDEX: 33.8 KG/M2

## 2025-07-21 DIAGNOSIS — M14.679 CHARCOT ARTHROPATHY OF MIDFOOT: ICD-10-CM

## 2025-07-21 DIAGNOSIS — M54.32 SCIATICA, LEFT SIDE: ICD-10-CM

## 2025-07-21 DIAGNOSIS — E11.69 TYPE 2 DIABETES MELLITUS WITH OTHER SPECIFIED COMPLICATION, WITH LONG-TERM CURRENT USE OF INSULIN (HCC): Primary | ICD-10-CM

## 2025-07-21 DIAGNOSIS — Z79.4 TYPE 2 DIABETES MELLITUS WITH OTHER SPECIFIED COMPLICATION, WITH LONG-TERM CURRENT USE OF INSULIN (HCC): Primary | ICD-10-CM

## 2025-07-21 PROCEDURE — 99213 OFFICE O/P EST LOW 20 MIN: CPT | Performed by: STUDENT IN AN ORGANIZED HEALTH CARE EDUCATION/TRAINING PROGRAM

## 2025-07-25 NOTE — TELEPHONE ENCOUNTER
Caller: Karolyn ( pt spouse)    Doctor: Dr. Ceballos     Reason for call:  Pt had procedure on 7/18/25, pt is experiencing pain on the left side going down the calf, when pt is sitting,resting its fine, but when pt walks to much the pain is felt. Karolyn would like a call back.    Call back#: [phone]

## 2025-07-25 NOTE — TELEPHONE ENCOUNTER
S/w wife Karolyn as per [facility] and inquired.  He completed T12-L2 MBB's on 7/18/'25.  She stated he started the day after the procedure of having LLE cramping. The pain goes down the back of the leg to the calf and he is having a hard time ambulating without the cramping. He is unable to walk very far because of the pain. Once he stops and sits down the pain goes away. Inquired if he had this pain prior and he stated no. Inquired about leg edema, redness or tenderness and he stated he has none of that. Inquired to see if he had pain with foot flexion and he stated no pain. Inquired about medication and she stated he is taking Gabapentin 300 mg BID , looks like the order stated TID, and he continues on the other prescribed medications. Only SE is that they make him tired. Encouraged ice vs heat to the LB and rest. Hopefully it could resolve, it could have been from postioning from the table? Do you think he should increase the Gabapentin. Inquired about the pain diary for the blocks and they stated they didn't receive one.   Please review and advise. Thank you

## 2025-07-28 ENCOUNTER — TELEPHONE (OUTPATIENT)
Dept: PAIN MEDICINE | Facility: CLINIC | Age: 80
End: 2025-07-28

## 2025-08-07 ENCOUNTER — TELEPHONE (OUTPATIENT)
Dept: PAIN MEDICINE | Facility: CLINIC | Age: 80
End: 2025-08-07

## 2025-08-11 ENCOUNTER — TELEPHONE (OUTPATIENT)
Age: 80
End: 2025-08-11

## 2025-08-11 ENCOUNTER — OFFICE VISIT (OUTPATIENT)
Dept: FAMILY MEDICINE CLINIC | Facility: CLINIC | Age: 80
End: 2025-08-11
Payer: COMMERCIAL

## 2025-08-13 ENCOUNTER — APPOINTMENT (OUTPATIENT)
Dept: RADIOLOGY | Facility: HOSPITAL | Age: 80
End: 2025-08-13
Payer: COMMERCIAL

## 2025-08-13 ENCOUNTER — HOSPITAL ENCOUNTER (OUTPATIENT)
Facility: AMBULARY SURGERY CENTER | Age: 80
Setting detail: OUTPATIENT SURGERY
Discharge: HOME | End: 2025-08-13
Attending: STUDENT IN AN ORGANIZED HEALTH CARE EDUCATION/TRAINING PROGRAM | Admitting: STUDENT IN AN ORGANIZED HEALTH CARE EDUCATION/TRAINING PROGRAM
Payer: COMMERCIAL

## 2025-08-18 DIAGNOSIS — M54.41 ACUTE LOW BACK PAIN WITH RIGHT-SIDED SCIATICA, UNSPECIFIED BACK PAIN LATERALITY: ICD-10-CM

## 2025-08-18 RX ORDER — METHOCARBAMOL 750 MG/1
750 TABLET, FILM COATED ORAL 3 TIMES DAILY
Qty: 270 TABLET | Refills: 3 | Status: SHIPPED | OUTPATIENT
Start: 2025-08-18

## 2025-08-22 ENCOUNTER — TELEPHONE (OUTPATIENT)
Dept: PAIN MEDICINE | Facility: CLINIC | Age: 80
End: 2025-08-22

## (undated) DEVICE — TRAY EPIDURAL PERIFIX 20GA X 3.5IN TUOHY 8ML

## (undated) DEVICE — SPONGE GAUZE 4 X 8 12 PLY STRL LF

## (undated) DEVICE — TOWEL SURG XR DETECT GREEN STRL RFD

## (undated) DEVICE — CHLORAPREP HI-LITE 26ML ORANGE

## (undated) DEVICE — PACK GENERAL LF

## (undated) DEVICE — SUT VICRYL 3-0 PS-2 27 IN J427H

## (undated) DEVICE — 3M™ TEGADERM™ TRANSPARENT FILM DRESSING FRAME STYLE, 1626W, 4 IN X 4-3/4 IN (10 CM X 12 CM), 50/CT 4CT/CASE: Brand: 3M™ TEGADERM™

## (undated) DEVICE — TIBURON EXTREMITY SHEET: Brand: CONVERTORS

## (undated) DEVICE — VIOLET BRAIDED (POLYGLACTIN 910), SYNTHETIC ABSORBABLE SUTURE: Brand: COATED VICRYL

## (undated) DEVICE — STERILE DOUBLE BASIN SET PACK: Brand: CARDINAL HEALTH

## (undated) DEVICE — ACE WRAP 4 IN UNSTERILE

## (undated) DEVICE — Device

## (undated) DEVICE — GLOVE SRG BIOGEL 7.5

## (undated) DEVICE — GLOVE SRG LF STRL BGL SKNSNS 7.5 PF

## (undated) DEVICE — ASTOUND STANDARD SURGICAL GOWN, XL: Brand: CONVERTORS

## (undated) DEVICE — SYRINGE 10ML LL

## (undated) DEVICE — TRAY PAIN SUPPORT

## (undated) DEVICE — SYRINGE 10ML LL CONTROL TOP

## (undated) DEVICE — STOCKINETTE,IMPERVIOUS,12X48,STERILE: Brand: MEDLINE

## (undated) DEVICE — SUT VICRYL 4-0 PS-2 27 IN J426H

## (undated) DEVICE — SYRINGE EPI 8ML LUER SLIP LOSS OF RESISTANCE PLASTIC PERFIX

## (undated) DEVICE — FABRIC REINFORCED, SURGICAL GOWN, XL: Brand: CONVERTORS

## (undated) DEVICE — CAST PADDING 4 IN SYNTHETIC NON-STRL

## (undated) DEVICE — SUT ETHILON 4-0 PS-2 18 IN 1667H

## (undated) DEVICE — SPLINT COMFORT 4 X 30

## (undated) DEVICE — SYRINGE 5ML LL

## (undated) DEVICE — DRAPE SHEET THREE QUARTER

## (undated) DEVICE — NEEDLE 25G X 5/8 SAFETY

## (undated) DEVICE — INTENDED FOR TISSUE SEPARATION, AND OTHER PROCEDURES THAT REQUIRE A SHARP SURGICAL BLADE TO PUNCTURE OR CUT.: Brand: BARD-PARKER ® CARBON RIB-BACK BLADES

## (undated) DEVICE — SPONGE LAP 18 X 18 IN STRL RFD

## (undated) DEVICE — U-DRAPE: Brand: CONVERTORS

## (undated) DEVICE — RADIOLOGY STERILE LABELS: Brand: CENTURION

## (undated) DEVICE — IV SET EXT SM BORE CARESITE 8IN

## (undated) DEVICE — TOWEL SET X-RAY

## (undated) DEVICE — GLOVE INDICATOR PI UNDERGLOVE SZ 7.5 BLUE

## (undated) DEVICE — ABDOMINAL PAD: Brand: DERMACEA

## (undated) DEVICE — CHLORAPREP APPLICATOR TINTED 10.5ML ONE-STEP

## (undated) DEVICE — SUT VICRYL 3-0 SH 27 IN J416H

## (undated) DEVICE — GLOVE INDICATOR PI UNDERGLOVE SZ 8 BLUE

## (undated) DEVICE — NEPTUNE E-SEP SMOKE EVACUATION PENCIL, COATED, 70MM BLADE, PUSH BUTTON SWITCH: Brand: NEPTUNE E-SEP

## (undated) DEVICE — PLASTIC ADHESIVE BANDAGE: Brand: CURITY

## (undated) DEVICE — OCCLUSIVE GAUZE STRIP,3% BISMUTH TRIBROMOPHENATE IN PETROLATUM BLEND: Brand: XEROFORM

## (undated) DEVICE — KERLIX BANDAGE ROLL: Brand: KERLIX